# Patient Record
Sex: FEMALE | Race: WHITE | NOT HISPANIC OR LATINO | Employment: FULL TIME | URBAN - METROPOLITAN AREA
[De-identification: names, ages, dates, MRNs, and addresses within clinical notes are randomized per-mention and may not be internally consistent; named-entity substitution may affect disease eponyms.]

---

## 2023-08-28 LAB
ALANINE AMINOTRANSFERASE (SGPT) (U/L) IN SER/PLAS: 8 U/L (ref 7–45)
ALBUMIN (G/DL) IN SER/PLAS: 4.1 G/DL (ref 3.4–5)
ALBUMIN (MG/L) IN URINE: 11.4 MG/L
ALBUMIN/CREATININE (UG/MG) IN URINE: 12.7 UG/MG CRT (ref 0–30)
ALKALINE PHOSPHATASE (U/L) IN SER/PLAS: 42 U/L (ref 33–136)
ANION GAP IN SER/PLAS: 17 MMOL/L (ref 10–20)
ASPARTATE AMINOTRANSFERASE (SGOT) (U/L) IN SER/PLAS: 12 U/L (ref 9–39)
BILIRUBIN TOTAL (MG/DL) IN SER/PLAS: 0.5 MG/DL (ref 0–1.2)
CALCIUM (MG/DL) IN SER/PLAS: 9.3 MG/DL (ref 8.6–10.6)
CARBON DIOXIDE, TOTAL (MMOL/L) IN SER/PLAS: 25 MMOL/L (ref 21–32)
CHLORIDE (MMOL/L) IN SER/PLAS: 106 MMOL/L (ref 98–107)
CHOLESTEROL (MG/DL) IN SER/PLAS: 174 MG/DL (ref 0–199)
CHOLESTEROL IN HDL (MG/DL) IN SER/PLAS: 74.5 MG/DL
CHOLESTEROL/HDL RATIO: 2.3
CREATININE (MG/DL) IN SER/PLAS: 1.25 MG/DL (ref 0.5–1.05)
CREATININE (MG/DL) IN URINE: 90 MG/DL (ref 20–320)
ERYTHROCYTE DISTRIBUTION WIDTH (RATIO) BY AUTOMATED COUNT: 12.3 % (ref 11.5–14.5)
ERYTHROCYTE MEAN CORPUSCULAR HEMOGLOBIN CONCENTRATION (G/DL) BY AUTOMATED: 31.3 G/DL (ref 32–36)
ERYTHROCYTE MEAN CORPUSCULAR VOLUME (FL) BY AUTOMATED COUNT: 96 FL (ref 80–100)
ERYTHROCYTES (10*6/UL) IN BLOOD BY AUTOMATED COUNT: 4.07 X10E12/L (ref 4–5.2)
GFR FEMALE: 45 ML/MIN/1.73M2
GLUCOSE (MG/DL) IN SER/PLAS: 103 MG/DL (ref 74–99)
HEMATOCRIT (%) IN BLOOD BY AUTOMATED COUNT: 39 % (ref 36–46)
HEMOGLOBIN (G/DL) IN BLOOD: 12.2 G/DL (ref 12–16)
LDL: 87 MG/DL (ref 0–99)
LEUKOCYTES (10*3/UL) IN BLOOD BY AUTOMATED COUNT: 6.1 X10E9/L (ref 4.4–11.3)
NRBC (PER 100 WBCS) BY AUTOMATED COUNT: 0 /100 WBC (ref 0–0)
PLATELETS (10*3/UL) IN BLOOD AUTOMATED COUNT: 228 X10E9/L (ref 150–450)
POTASSIUM (MMOL/L) IN SER/PLAS: 4.7 MMOL/L (ref 3.5–5.3)
PROTEIN TOTAL: 6.4 G/DL (ref 6.4–8.2)
SODIUM (MMOL/L) IN SER/PLAS: 143 MMOL/L (ref 136–145)
THYROTROPIN (MIU/L) IN SER/PLAS BY DETECTION LIMIT <= 0.05 MIU/L: 1.82 MIU/L (ref 0.44–3.98)
TRIGLYCERIDE (MG/DL) IN SER/PLAS: 62 MG/DL (ref 0–149)
UREA NITROGEN (MG/DL) IN SER/PLAS: 30 MG/DL (ref 6–23)
VLDL: 12 MG/DL (ref 0–40)

## 2023-10-18 PROBLEM — I48.0 PAROXYSMAL ATRIAL FIBRILLATION (MULTI): Status: ACTIVE | Noted: 2023-10-18

## 2023-10-18 PROBLEM — G93.32 CHRONIC FATIGUE SYNDROME: Status: ACTIVE | Noted: 2023-10-18

## 2023-10-18 PROBLEM — E66.9 DIABETES MELLITUS TYPE 2 IN OBESE: Status: ACTIVE | Noted: 2023-10-18

## 2023-10-18 PROBLEM — E11.69 DIABETES MELLITUS TYPE 2 IN OBESE: Status: ACTIVE | Noted: 2023-10-18

## 2023-10-18 PROBLEM — E78.5 HYPERLIPIDEMIA: Status: ACTIVE | Noted: 2023-10-18

## 2023-10-18 PROBLEM — R42 DIZZINESS: Status: ACTIVE | Noted: 2023-10-18

## 2023-10-18 PROBLEM — F41.8 MIXED ANXIETY AND DEPRESSIVE DISORDER: Status: ACTIVE | Noted: 2023-10-18

## 2023-10-18 PROBLEM — G20.A1 PARKINSONS DISEASE (MULTI): Status: ACTIVE | Noted: 2023-10-18

## 2023-10-18 PROBLEM — I10 BENIGN ESSENTIAL HTN: Status: ACTIVE | Noted: 2023-10-18

## 2023-10-18 NOTE — PROGRESS NOTES
Subjective   Patient ID:   Dina Mullins is a 73 y.o. female who presents for Establish Care.  HPI  Previous [facility] [place] [place] Karyn patient.    HTN/A-fib:  Taking Lasix, Lisinopril-hydrochlorothiazide, Xarelto.  Seeing cardiology.  BP today is 128/80.  Denies dizziness, headaches.    Diabetes:  Taking Lantus 26 units daily, Rybelsus.  Last A1C was 8.1 in 2020.  POC today is 8.2.    Dizziness:  Taking Meclizine.    HLD:  Taking Atorvastatin.  Last checked Aug 2023.    Anxiety/Depression:  Taking Cymbalta.  Denies SI/HI.    Parkinson's:  Mild.  Had seen neurology in the past.  No current issues.    Health maintenance:  Smoking: Never a smoker.  Mammogram (40-75): Dec 2021. DUE  Labs: Aug 2023.  Colonoscopy (50-75): Reports 6-7 years ago.  DEXA (65+, q 2 years): Dec 2021.  Prevnar 13 (65+):  Pneumovax 23 (65+, 1 year after Prev 13):  Influenza:  Zostavax (60+, 1 time, at pharmacy):    Review of Systems  12 point review of systems negative unless stated above in HPI    Vitals:    10/23/23 1106   BP: 128/80   Pulse: 70   SpO2: 98%     Physical Exam  General: Alert and oriented, well nourished, no acute distress.  Lungs: Clear to auscultation, non-labored respiration.  Heart: Normal rate, regular rhythm, no murmur, gallop or edema.  Neurologic: Awake, alert, and oriented X3, CN II-XII intact.  Psychiatric: Cooperative, appropriate mood and affect.    Assessment/Plan   It was nice meeting you!  I have ordered you a mammogram to be done as soon as you are able.  We will call the results.  Continue specialty care.  Continue with diet and exercise for the A1C.  Continue the same medications.  Chronic conditions are stable.  Call with questions or concerns.    Follow up  4 months  Diagnoses and all orders for this visit:  Benign essential HTN  -     lisinopriL-hydrochlorothiazide 20-25 mg tablet; Take 1 tablet by mouth once daily.  -     furosemide (Lasix) 40 mg tablet; Take 1 tablet (40 mg) by mouth once daily.  Diabetes  mellitus type 2 in obese (CMS/HCC)  -     POCT glycosylated hemoglobin (Hb A1C) manually resulted  -     DULoxetine (Cymbalta) 20 mg DR capsule; Take 1 capsule (20 mg) by mouth once daily.  -     Lantus Solostar U-100 Insulin 100 unit/mL (3 mL) pen; Inject 25 Units under the skin once daily in the morning.  Pure hypercholesterolemia  Mixed anxiety and depressive disorder  Parkinson's disease, unspecified whether dyskinesia present, unspecified whether manifestations fluctuate  Paroxysmal atrial fibrillation (CMS/HCC)  Dizziness  -     fluticasone (Flonase) 50 mcg/actuation nasal spray; Administer 1 spray into each nostril once daily as needed for allergies or rhinitis.  -     meclizine (Antivert) 25 mg tablet; Take 1 tablet (25 mg) by mouth 3 times a day as needed for dizziness.  Visit for screening mammogram  -     BI mammo bilateral screening tomosynthesis; Future  Longstanding persistent atrial fibrillation (CMS/HCC)  Obesity, morbid (CMS/HCC)  Hyperlipidemia, unspecified hyperlipidemia type  -     atorvastatin (Lipitor) 40 mg tablet; Take 1 tablet (40 mg) by mouth once daily at bedtime.

## 2023-10-19 DIAGNOSIS — E66.9 DIABETES MELLITUS TYPE 2 IN OBESE: ICD-10-CM

## 2023-10-19 DIAGNOSIS — E11.69 DIABETES MELLITUS TYPE 2 IN OBESE: ICD-10-CM

## 2023-10-19 DIAGNOSIS — E78.5 HYPERLIPIDEMIA, UNSPECIFIED HYPERLIPIDEMIA TYPE: ICD-10-CM

## 2023-10-19 RX ORDER — ATORVASTATIN CALCIUM 40 MG/1
40 TABLET, FILM COATED ORAL NIGHTLY
Qty: 90 TABLET | Refills: 0 | Status: SHIPPED | OUTPATIENT
Start: 2023-10-19 | End: 2023-10-23 | Stop reason: SDUPTHER

## 2023-10-20 PROBLEM — K25.9 GASTRIC ULCER: Status: ACTIVE | Noted: 2023-10-20

## 2023-10-20 PROBLEM — D50.0 ANEMIA DUE TO CHRONIC BLOOD LOSS: Status: ACTIVE | Noted: 2023-10-20

## 2023-10-20 PROBLEM — E86.0 DEHYDRATION: Status: ACTIVE | Noted: 2023-10-20

## 2023-10-20 PROBLEM — N95.1 FEMALE CLIMACTERIC: Status: ACTIVE | Noted: 2023-10-20

## 2023-10-20 PROBLEM — K59.00 CONSTIPATION: Status: ACTIVE | Noted: 2023-10-20

## 2023-10-20 PROBLEM — M13.0 DEGENERATIVE POLYARTHRITIS: Status: ACTIVE | Noted: 2023-10-20

## 2023-10-20 PROBLEM — R07.9 CHEST PAIN: Status: ACTIVE | Noted: 2023-10-20

## 2023-10-20 PROBLEM — E11.65 UNCONTROLLED TYPE 2 DIABETES MELLITUS WITH HYPERGLYCEMIA (MULTI): Status: ACTIVE | Noted: 2023-10-20

## 2023-10-20 PROBLEM — R55 SYNCOPE AND COLLAPSE: Status: ACTIVE | Noted: 2023-10-20

## 2023-10-20 PROBLEM — Z78.9 MEDICAL HOME PATIENT: Status: ACTIVE | Noted: 2023-10-20

## 2023-10-20 PROBLEM — N39.46 MIXED INCONTINENCE: Status: ACTIVE | Noted: 2023-10-20

## 2023-10-20 PROBLEM — M17.0 PRIMARY OSTEOARTHRITIS OF BOTH KNEES: Status: ACTIVE | Noted: 2023-10-20

## 2023-10-20 PROBLEM — E13.43 GASTROPARESIS DUE TO SECONDARY DIABETES (MULTI): Status: ACTIVE | Noted: 2023-10-20

## 2023-10-20 PROBLEM — R26.9 ABNORMAL GAIT: Status: ACTIVE | Noted: 2023-10-20

## 2023-10-20 PROBLEM — J30.9 ALLERGIC RHINITIS DUE TO ALLERGEN: Status: ACTIVE | Noted: 2023-10-20

## 2023-10-20 PROBLEM — E55.9 VITAMIN D DEFICIENCY: Status: ACTIVE | Noted: 2023-10-20

## 2023-10-20 PROBLEM — I87.2 VENOUS STASIS DERMATITIS OF BOTH LOWER EXTREMITIES: Status: ACTIVE | Noted: 2023-10-20

## 2023-10-20 PROBLEM — I48.91 ATRIAL FIBRILLATION (MULTI): Status: ACTIVE | Noted: 2023-10-20

## 2023-10-20 PROBLEM — I83.893 VARICOSE VEINS OF BOTH LEGS WITH EDEMA: Status: ACTIVE | Noted: 2023-10-20

## 2023-10-20 PROBLEM — I95.9 LOW BLOOD PRESSURE: Status: ACTIVE | Noted: 2023-10-20

## 2023-10-20 PROBLEM — K57.90 DIVERTICULOSIS: Status: ACTIVE | Noted: 2023-10-20

## 2023-10-20 RX ORDER — LISINOPRIL AND HYDROCHLOROTHIAZIDE 20; 25 MG/1; MG/1
1 TABLET ORAL DAILY
COMMUNITY
End: 2023-10-23 | Stop reason: ALTCHOICE

## 2023-10-20 RX ORDER — ORAL SEMAGLUTIDE 7 MG/1
1 TABLET ORAL
COMMUNITY
Start: 2023-05-30 | End: 2023-10-23 | Stop reason: ALTCHOICE

## 2023-10-20 RX ORDER — FUROSEMIDE 40 MG/1
40 TABLET ORAL DAILY
COMMUNITY
End: 2023-10-23 | Stop reason: SDUPTHER

## 2023-10-20 RX ORDER — FLUTICASONE PROPIONATE 50 MCG
1 SPRAY, SUSPENSION (ML) NASAL DAILY PRN
COMMUNITY
End: 2023-10-23 | Stop reason: SDUPTHER

## 2023-10-20 RX ORDER — CLINDAMYCIN HYDROCHLORIDE 300 MG/1
300 CAPSULE ORAL 4 TIMES DAILY
COMMUNITY
End: 2024-01-31 | Stop reason: ALTCHOICE

## 2023-10-20 RX ORDER — MECLIZINE HYDROCHLORIDE 25 MG/1
25 TABLET ORAL 3 TIMES DAILY PRN
COMMUNITY
End: 2023-10-23 | Stop reason: SDUPTHER

## 2023-10-20 RX ORDER — ACETAMINOPHEN 325 MG/1
650 TABLET ORAL EVERY 4 HOURS PRN
COMMUNITY

## 2023-10-20 RX ORDER — SEMAGLUTIDE 1.34 MG/ML
0.25 INJECTION, SOLUTION SUBCUTANEOUS
COMMUNITY
Start: 2023-03-01 | End: 2023-10-23 | Stop reason: ALTCHOICE

## 2023-10-20 RX ORDER — DULOXETIN HYDROCHLORIDE 20 MG/1
20 CAPSULE, DELAYED RELEASE ORAL DAILY
COMMUNITY
End: 2023-10-23 | Stop reason: SDUPTHER

## 2023-10-20 RX ORDER — INSULIN GLARGINE 100 [IU]/ML
25 INJECTION, SOLUTION SUBCUTANEOUS EVERY MORNING
COMMUNITY
End: 2023-10-23 | Stop reason: SDUPTHER

## 2023-10-20 RX ORDER — TALC
3 POWDER (GRAM) TOPICAL NIGHTLY PRN
COMMUNITY
End: 2023-10-23 | Stop reason: ALTCHOICE

## 2023-10-20 RX ORDER — PEN NEEDLE, DIABETIC 31 GX3/16"
NEEDLE, DISPOSABLE MISCELLANEOUS
COMMUNITY
Start: 2022-12-19 | End: 2023-10-24

## 2023-10-20 RX ORDER — DULOXETIN HYDROCHLORIDE 60 MG/1
60 CAPSULE, DELAYED RELEASE ORAL DAILY
COMMUNITY
End: 2023-10-23 | Stop reason: ALTCHOICE

## 2023-10-20 RX ORDER — LISINOPRIL 20 MG/1
20 TABLET ORAL DAILY
COMMUNITY
End: 2023-10-23 | Stop reason: ALTCHOICE

## 2023-10-20 RX ORDER — INSULIN GLARGINE 100 [IU]/ML
26 INJECTION, SOLUTION SUBCUTANEOUS
COMMUNITY
End: 2024-05-13 | Stop reason: WASHOUT

## 2023-10-23 ENCOUNTER — OFFICE VISIT (OUTPATIENT)
Dept: PRIMARY CARE | Facility: CLINIC | Age: 73
End: 2023-10-23
Payer: MEDICARE

## 2023-10-23 VITALS
BODY MASS INDEX: 48.96 KG/M2 | DIASTOLIC BLOOD PRESSURE: 80 MMHG | HEART RATE: 70 BPM | OXYGEN SATURATION: 98 % | WEIGHT: 272 LBS | SYSTOLIC BLOOD PRESSURE: 128 MMHG

## 2023-10-23 DIAGNOSIS — G20.A1 PARKINSON'S DISEASE, UNSPECIFIED WHETHER DYSKINESIA PRESENT, UNSPECIFIED WHETHER MANIFESTATIONS FLUCTUATE (MULTI): ICD-10-CM

## 2023-10-23 DIAGNOSIS — E78.5 HYPERLIPIDEMIA, UNSPECIFIED HYPERLIPIDEMIA TYPE: ICD-10-CM

## 2023-10-23 DIAGNOSIS — I10 BENIGN ESSENTIAL HTN: Primary | ICD-10-CM

## 2023-10-23 DIAGNOSIS — I48.11 LONGSTANDING PERSISTENT ATRIAL FIBRILLATION (MULTI): ICD-10-CM

## 2023-10-23 DIAGNOSIS — E11.69 DIABETES MELLITUS TYPE 2 IN OBESE: ICD-10-CM

## 2023-10-23 DIAGNOSIS — Z12.31 VISIT FOR SCREENING MAMMOGRAM: ICD-10-CM

## 2023-10-23 DIAGNOSIS — E78.00 PURE HYPERCHOLESTEROLEMIA: ICD-10-CM

## 2023-10-23 DIAGNOSIS — F41.8 MIXED ANXIETY AND DEPRESSIVE DISORDER: ICD-10-CM

## 2023-10-23 DIAGNOSIS — E66.9 DIABETES MELLITUS TYPE 2 IN OBESE: ICD-10-CM

## 2023-10-23 DIAGNOSIS — I48.0 PAROXYSMAL ATRIAL FIBRILLATION (MULTI): ICD-10-CM

## 2023-10-23 DIAGNOSIS — E66.01 OBESITY, MORBID (MULTI): ICD-10-CM

## 2023-10-23 DIAGNOSIS — R42 DIZZINESS: ICD-10-CM

## 2023-10-23 PROBLEM — E13.43 GASTROPARESIS DUE TO SECONDARY DIABETES (MULTI): Status: RESOLVED | Noted: 2023-10-20 | Resolved: 2023-10-23

## 2023-10-23 PROBLEM — E11.65 UNCONTROLLED TYPE 2 DIABETES MELLITUS WITH HYPERGLYCEMIA (MULTI): Status: RESOLVED | Noted: 2023-10-20 | Resolved: 2023-10-23

## 2023-10-23 LAB — POC HEMOGLOBIN A1C: 8.2 (ref 4.2–6.5)

## 2023-10-23 PROCEDURE — 1036F TOBACCO NON-USER: CPT | Performed by: PHYSICIAN ASSISTANT

## 2023-10-23 PROCEDURE — 99214 OFFICE O/P EST MOD 30 MIN: CPT | Performed by: PHYSICIAN ASSISTANT

## 2023-10-23 PROCEDURE — 1125F AMNT PAIN NOTED PAIN PRSNT: CPT | Performed by: PHYSICIAN ASSISTANT

## 2023-10-23 PROCEDURE — 1159F MED LIST DOCD IN RCRD: CPT | Performed by: PHYSICIAN ASSISTANT

## 2023-10-23 PROCEDURE — 3079F DIAST BP 80-89 MM HG: CPT | Performed by: PHYSICIAN ASSISTANT

## 2023-10-23 PROCEDURE — 1160F RVW MEDS BY RX/DR IN RCRD: CPT | Performed by: PHYSICIAN ASSISTANT

## 2023-10-23 PROCEDURE — 3074F SYST BP LT 130 MM HG: CPT | Performed by: PHYSICIAN ASSISTANT

## 2023-10-23 PROCEDURE — 83036 HEMOGLOBIN GLYCOSYLATED A1C: CPT | Mod: QW | Performed by: PHYSICIAN ASSISTANT

## 2023-10-23 RX ORDER — FUROSEMIDE 40 MG/1
40 TABLET ORAL DAILY
Qty: 90 TABLET | Refills: 1 | Status: SHIPPED | OUTPATIENT
Start: 2023-10-23 | End: 2024-05-13 | Stop reason: SDUPTHER

## 2023-10-23 RX ORDER — MECLIZINE HYDROCHLORIDE 25 MG/1
25 TABLET ORAL 3 TIMES DAILY PRN
Qty: 90 TABLET | Refills: 1 | Status: SHIPPED | OUTPATIENT
Start: 2023-10-23 | End: 2024-05-13 | Stop reason: SDUPTHER

## 2023-10-23 RX ORDER — DULOXETIN HYDROCHLORIDE 20 MG/1
20 CAPSULE, DELAYED RELEASE ORAL DAILY
Qty: 90 CAPSULE | Refills: 1 | Status: SHIPPED | OUTPATIENT
Start: 2023-10-23 | End: 2024-05-13 | Stop reason: SDUPTHER

## 2023-10-23 RX ORDER — ATORVASTATIN CALCIUM 40 MG/1
40 TABLET, FILM COATED ORAL NIGHTLY
Qty: 90 TABLET | Refills: 1 | Status: SHIPPED | OUTPATIENT
Start: 2023-10-23 | End: 2023-10-24

## 2023-10-23 RX ORDER — ATORVASTATIN CALCIUM 40 MG/1
40 TABLET, FILM COATED ORAL NIGHTLY
Qty: 90 TABLET | Refills: 0 | Status: CANCELLED | OUTPATIENT
Start: 2023-10-23

## 2023-10-23 RX ORDER — FLUTICASONE PROPIONATE 50 MCG
1 SPRAY, SUSPENSION (ML) NASAL DAILY PRN
Qty: 16 G | Refills: 1 | Status: SHIPPED | OUTPATIENT
Start: 2023-10-23

## 2023-10-23 RX ORDER — INSULIN GLARGINE 100 [IU]/ML
25 INJECTION, SOLUTION SUBCUTANEOUS EVERY MORNING
Qty: 9 ML | Refills: 2 | Status: SHIPPED | OUTPATIENT
Start: 2023-10-23 | End: 2024-05-13 | Stop reason: SDUPTHER

## 2023-10-23 RX ORDER — LISINOPRIL AND HYDROCHLOROTHIAZIDE 20; 25 MG/1; MG/1
1 TABLET ORAL DAILY
Qty: 90 TABLET | Refills: 1 | Status: SHIPPED | OUTPATIENT
Start: 2023-10-23 | End: 2024-01-31 | Stop reason: SINTOL

## 2023-10-23 ASSESSMENT — ENCOUNTER SYMPTOMS
DEPRESSION: 0
OCCASIONAL FEELINGS OF UNSTEADINESS: 1
LOSS OF SENSATION IN FEET: 0

## 2023-10-23 ASSESSMENT — PATIENT HEALTH QUESTIONNAIRE - PHQ9
SUM OF ALL RESPONSES TO PHQ9 QUESTIONS 1 AND 2: 0
2. FEELING DOWN, DEPRESSED OR HOPELESS: NOT AT ALL
1. LITTLE INTEREST OR PLEASURE IN DOING THINGS: NOT AT ALL

## 2023-10-23 ASSESSMENT — PAIN SCALES - GENERAL: PAINLEVEL: 10-WORST PAIN EVER

## 2023-10-24 RX ORDER — ATORVASTATIN CALCIUM 40 MG/1
40 TABLET, FILM COATED ORAL NIGHTLY
Qty: 90 TABLET | Refills: 0 | Status: SHIPPED | OUTPATIENT
Start: 2023-10-24 | End: 2024-05-13 | Stop reason: SDUPTHER

## 2023-10-24 RX ORDER — PEN NEEDLE, DIABETIC 30 GX3/16"
NEEDLE, DISPOSABLE MISCELLANEOUS
Qty: 100 EACH | Refills: 3 | Status: SHIPPED | OUTPATIENT
Start: 2023-10-24

## 2023-12-11 ENCOUNTER — OFFICE VISIT (OUTPATIENT)
Dept: NEUROLOGY | Facility: CLINIC | Age: 73
End: 2023-12-11
Payer: MEDICARE

## 2023-12-11 VITALS — DIASTOLIC BLOOD PRESSURE: 78 MMHG | RESPIRATION RATE: 12 BRPM | SYSTOLIC BLOOD PRESSURE: 126 MMHG | HEART RATE: 64 BPM

## 2023-12-11 DIAGNOSIS — G20.C PARKINSONISM, UNSPECIFIED PARKINSONISM TYPE (MULTI): ICD-10-CM

## 2023-12-11 DIAGNOSIS — R42 DIZZINESS: Primary | ICD-10-CM

## 2023-12-11 DIAGNOSIS — R51.9 NONINTRACTABLE HEADACHE, UNSPECIFIED CHRONICITY PATTERN, UNSPECIFIED HEADACHE TYPE: ICD-10-CM

## 2023-12-11 PROCEDURE — 1125F AMNT PAIN NOTED PAIN PRSNT: CPT | Performed by: PSYCHIATRY & NEUROLOGY

## 2023-12-11 PROCEDURE — 3074F SYST BP LT 130 MM HG: CPT | Performed by: PSYCHIATRY & NEUROLOGY

## 2023-12-11 PROCEDURE — 1036F TOBACCO NON-USER: CPT | Performed by: PSYCHIATRY & NEUROLOGY

## 2023-12-11 PROCEDURE — 3078F DIAST BP <80 MM HG: CPT | Performed by: PSYCHIATRY & NEUROLOGY

## 2023-12-11 PROCEDURE — 1160F RVW MEDS BY RX/DR IN RCRD: CPT | Performed by: PSYCHIATRY & NEUROLOGY

## 2023-12-11 PROCEDURE — 1159F MED LIST DOCD IN RCRD: CPT | Performed by: PSYCHIATRY & NEUROLOGY

## 2023-12-11 PROCEDURE — 99215 OFFICE O/P EST HI 40 MIN: CPT | Performed by: PSYCHIATRY & NEUROLOGY

## 2023-12-11 NOTE — PATIENT INSTRUCTIONS
Dina it was a pleasure to see you today.      I ordered a brain MRI scan, we will set up for you at  facility in [place] next to [facility].    Then we'll see you back in a couple weeks to go over it.

## 2023-12-11 NOTE — PROGRESS NOTES
Chief complaint:    73 year old woman with left hand tremor, parkinsonism.  PCP now Ken Saleh PA-C.    HPI:    Left hand tremor persists.  Notices in left leg too sometimes, especially when sitting still.  When pays attention to it she can make it stop, then starts back up when stops paying attention.  Been having occ headaches, left posterior.  Has chronic vertigo for years, gets dizzy time to time.  Uses meclizine, helps.  Says has seen ENT Dr. Burt in past.  No recent vestibular therapy.  Has diabetes on insulin, says sugars sometimes dip into 60's.      Current meds reviewed.    I reviewed the relevant portions of the patient's chart since the last visit with me on 12/5/22.    /78   Pulse 64   Resp 12     Physical Exam  Very overweight.  Uses cane.    Neurologic Exam     Mental Status   Oriented to person, place, and time.   Level of consciousness: alert    Cranial Nerves   Cranial nerves II through XII intact.     Motor Exam   Muscle bulk: normal  Overall muscle tone: normal    Strength   Strength 5/5 except as noted.     Sensory Exam   Light touch normal.     Gait, Coordination, and Reflexes     Gait  Gait: normal    Coordination   Romberg: negative    Tremor   Action tremor: left arm    Reflexes   Reflexes 2+ except as noted.   Right plantar: normal  Left plantar: normal  Rest tremor L hand, looks parkinsonian.  Walks with cane, mild shuffle.      Assessment and Plan:  Left hand tremor, parkinsonism.  Headaches, dizziness, h/o vertigo.  Discussed.  Ordered brain MRI scan to investigate.  Then see back in couple weeks.  Consider trial of carb/levodopa.  Consider vestibular rehab theraphy.  Advised and counseled patient on the medical situation, outlook, prognosis, what to expect and all questions answered.  OA17rbp/TC>50%      Teodoro Galarza MD

## 2023-12-20 ENCOUNTER — TELEPHONE (OUTPATIENT)
Dept: NEUROLOGY | Facility: CLINIC | Age: 73
End: 2023-12-20
Payer: MEDICARE

## 2023-12-20 ENCOUNTER — ANCILLARY PROCEDURE (OUTPATIENT)
Dept: RADIOLOGY | Facility: CLINIC | Age: 73
End: 2023-12-20
Payer: MEDICARE

## 2023-12-20 DIAGNOSIS — R42 DIZZINESS: ICD-10-CM

## 2023-12-20 DIAGNOSIS — R51.9 NONINTRACTABLE HEADACHE, UNSPECIFIED CHRONICITY PATTERN, UNSPECIFIED HEADACHE TYPE: ICD-10-CM

## 2023-12-20 DIAGNOSIS — G20.C PARKINSONISM, UNSPECIFIED PARKINSONISM TYPE (MULTI): ICD-10-CM

## 2023-12-20 PROCEDURE — 70551 MRI BRAIN STEM W/O DYE: CPT

## 2023-12-26 ENCOUNTER — APPOINTMENT (OUTPATIENT)
Dept: RADIOLOGY | Facility: CLINIC | Age: 73
End: 2023-12-26
Payer: MEDICARE

## 2024-01-08 ENCOUNTER — OFFICE VISIT (OUTPATIENT)
Dept: NEUROLOGY | Facility: CLINIC | Age: 74
End: 2024-01-08
Payer: MEDICARE

## 2024-01-08 DIAGNOSIS — R42 VERTIGO: ICD-10-CM

## 2024-01-08 DIAGNOSIS — G20.A1 PARKINSON'S DISEASE, UNSPECIFIED WHETHER DYSKINESIA PRESENT, UNSPECIFIED WHETHER MANIFESTATIONS FLUCTUATE (MULTI): Primary | ICD-10-CM

## 2024-01-08 PROCEDURE — 1125F AMNT PAIN NOTED PAIN PRSNT: CPT | Performed by: PSYCHIATRY & NEUROLOGY

## 2024-01-08 PROCEDURE — 1159F MED LIST DOCD IN RCRD: CPT | Performed by: PSYCHIATRY & NEUROLOGY

## 2024-01-08 PROCEDURE — 1036F TOBACCO NON-USER: CPT | Performed by: PSYCHIATRY & NEUROLOGY

## 2024-01-08 PROCEDURE — 1160F RVW MEDS BY RX/DR IN RCRD: CPT | Performed by: PSYCHIATRY & NEUROLOGY

## 2024-01-08 PROCEDURE — 99214 OFFICE O/P EST MOD 30 MIN: CPT | Performed by: PSYCHIATRY & NEUROLOGY

## 2024-01-08 RX ORDER — CARBIDOPA AND LEVODOPA 25; 100 MG/1; MG/1
1 TABLET ORAL 3 TIMES DAILY
Qty: 90 TABLET | Refills: 3 | Status: SHIPPED | OUTPATIENT
Start: 2024-01-08 | End: 2025-01-07

## 2024-01-08 NOTE — PROGRESS NOTES
Chief complaint:    73 year old woman with left tremor, parkinsonism, dizziness.  PCP Ken Saleh PA-C.    HPI:    Had the brain MRI scan, came out OK.  Persisting left hand rest tremor. Gait slow, uses cane, some shuffle.  Has ongoing dizziness, long h/o vertigo per patient.      Current meds reviewed.    I reviewed the relevant portions of the patient's chart since the last visit with me on  12/11/23.    Neurologic Exam     Mental Status   Oriented to person, place, and time.   Level of consciousness: alert    Cranial Nerves   Cranial nerves II through XII intact.     Motor Exam   Muscle bulk: normal  Overall muscle tone: normal    Strength   Strength 5/5 except as noted.     Sensory Exam   Light touch normal.     Gait, Coordination, and Reflexes     Gait  Gait: normal    Coordination   Romberg: negative    Tremor   Action tremor: left arm    Reflexes   Reflexes 2+ except as noted.   Right plantar: normal  Left plantar: normal  Rest tremor L hand, looks parkinsonian.  Walks with cane, mild shuffle.      I reviewed the following data on the patient:  Brain MRI scan negative.    Assessment and Plan:  Parkinsonism.  Discussed.   Suspect PD.  Will begin treatment with carb/levo 25/100 tablet and titrate up to one tab TID.  The risks, benefits, alternatives, and indications were discussed with the patient, all questions answered, and the patient understands and agrees to proceed.  Also ordered PT for the dizziness/vertigo.  Call if problems and see back in 6 weeks for recheck.  The patient was advised I will be retiring in September of 2024.   [id number]/TC>50%      Teodoro Galarza MD

## 2024-01-08 NOTE — PATIENT INSTRUCTIONS
"Dina it was a pleasure seeing you today.  You are showing signs of Parkinson's.  To treat it, here is what I recommended:     Start a new medicine called \"carbidopa/levodopa 25/100.\"  Here is what to do:     Week 1:  take one tablet each morning with breakfast   Week 2:  take one tablet with breakfast and one tablet with lunch   Week 3:  take one tablet with breakfast, one with lunch, and one with dinner.    2.    I'll also order some physical therapy for you.    3.    Come back and see me in six weeks for a check-up.  "

## 2024-01-31 ENCOUNTER — OFFICE VISIT (OUTPATIENT)
Dept: PRIMARY CARE | Facility: CLINIC | Age: 74
End: 2024-01-31
Payer: MEDICARE

## 2024-01-31 VITALS
SYSTOLIC BLOOD PRESSURE: 108 MMHG | DIASTOLIC BLOOD PRESSURE: 70 MMHG | OXYGEN SATURATION: 98 % | WEIGHT: 267.6 LBS | HEART RATE: 62 BPM | BODY MASS INDEX: 47.4 KG/M2

## 2024-01-31 DIAGNOSIS — I10 BENIGN ESSENTIAL HTN: ICD-10-CM

## 2024-01-31 DIAGNOSIS — E11.69 DIABETES MELLITUS TYPE 2 IN OBESE: ICD-10-CM

## 2024-01-31 DIAGNOSIS — E66.9 DIABETES MELLITUS TYPE 2 IN OBESE: ICD-10-CM

## 2024-01-31 DIAGNOSIS — I48.0 PAROXYSMAL ATRIAL FIBRILLATION (MULTI): ICD-10-CM

## 2024-01-31 DIAGNOSIS — R42 DIZZINESS: Primary | ICD-10-CM

## 2024-01-31 PROCEDURE — 99214 OFFICE O/P EST MOD 30 MIN: CPT | Performed by: INTERNAL MEDICINE

## 2024-01-31 PROCEDURE — 4010F ACE/ARB THERAPY RXD/TAKEN: CPT | Performed by: INTERNAL MEDICINE

## 2024-01-31 PROCEDURE — 1036F TOBACCO NON-USER: CPT | Performed by: INTERNAL MEDICINE

## 2024-01-31 PROCEDURE — 1159F MED LIST DOCD IN RCRD: CPT | Performed by: INTERNAL MEDICINE

## 2024-01-31 PROCEDURE — 3074F SYST BP LT 130 MM HG: CPT | Performed by: INTERNAL MEDICINE

## 2024-01-31 PROCEDURE — 1160F RVW MEDS BY RX/DR IN RCRD: CPT | Performed by: INTERNAL MEDICINE

## 2024-01-31 PROCEDURE — 3078F DIAST BP <80 MM HG: CPT | Performed by: INTERNAL MEDICINE

## 2024-01-31 PROCEDURE — 1125F AMNT PAIN NOTED PAIN PRSNT: CPT | Performed by: INTERNAL MEDICINE

## 2024-01-31 PROCEDURE — 1157F ADVNC CARE PLAN IN RCRD: CPT | Performed by: INTERNAL MEDICINE

## 2024-01-31 RX ORDER — LISINOPRIL 10 MG/1
10 TABLET ORAL DAILY
Qty: 30 TABLET | Refills: 5 | Status: SHIPPED | OUTPATIENT
Start: 2024-01-31 | End: 2024-05-13 | Stop reason: SDUPTHER

## 2024-01-31 ASSESSMENT — ENCOUNTER SYMPTOMS
OCCASIONAL FEELINGS OF UNSTEADINESS: 0
CHILLS: 0
HEADACHES: 0
DEPRESSION: 0
FEVER: 0
PALPITATIONS: 0
DIZZINESS: 1
LOSS OF SENSATION IN FEET: 0
RHINORRHEA: 0
LIGHT-HEADEDNESS: 1

## 2024-01-31 ASSESSMENT — PAIN SCALES - GENERAL: PAINLEVEL: 5

## 2024-01-31 ASSESSMENT — PATIENT HEALTH QUESTIONNAIRE - PHQ9
10. IF YOU CHECKED OFF ANY PROBLEMS, HOW DIFFICULT HAVE THESE PROBLEMS MADE IT FOR YOU TO DO YOUR WORK, TAKE CARE OF THINGS AT HOME, OR GET ALONG WITH OTHER PEOPLE: SOMEWHAT DIFFICULT
SUM OF ALL RESPONSES TO PHQ9 QUESTIONS 1 AND 2: 2
2. FEELING DOWN, DEPRESSED OR HOPELESS: SEVERAL DAYS
1. LITTLE INTEREST OR PLEASURE IN DOING THINGS: SEVERAL DAYS

## 2024-01-31 NOTE — PROGRESS NOTES
Subjective   Patient ID: Dina Mullins is a 73 y.o. female who presents for Vertigo.    Around marifer-felt dizzy and a little sick. Took meclizine. But kept going on. Saw Dr Maria--did MRI and added dopamine for her worsening parkisons. Helped a little but still very dizzy any time bends over. Not spinning more like off balance         Review of Systems   Constitutional:  Negative for chills and fever.   HENT:  Negative for congestion and rhinorrhea.    Cardiovascular:  Negative for chest pain and palpitations.   Skin:  Positive for rash (between legs).   Neurological:  Positive for dizziness and light-headedness. Negative for syncope and headaches.       Objective   /70 (BP Location: Left arm, Patient Position: Sitting)   Pulse 62   Wt 121 kg (267 lb 9.6 oz)   SpO2 98%   BMI 47.40 kg/m²     Physical Exam  HENT:      Ears:      Comments: TM scarred but clear  Eyes:      Comments: No nystagmus   Cardiovascular:      Rate and Rhythm: Normal rate and regular rhythm.      Heart sounds: No murmur heard.  Pulmonary:      Effort: Pulmonary effort is normal.      Breath sounds: Normal breath sounds.   Musculoskeletal:      Comments: Struggles to rise from chair-needs arms   Neurological:      Sensory: Sensory deficit (feet) present.         Assessment/Plan  will order labs, stop hydrochlorothiazide and decrease lisinopril dose  Diagnoses and all orders for this visit:  Dizziness  -     CBC and Auto Differential; Future  -     Urinalysis with Reflex Microscopic; Future  Paroxysmal atrial fibrillation (CMS/HCC)  -     Comprehensive Metabolic Panel; Future  Diabetes mellitus type 2 in obese (CMS/HCC)  -     Hemoglobin A1C; Future  Benign essential HTN  -     lisinopril 10 mg tablet; Take 1 tablet (10 mg) by mouth once daily.    Advise follow up next week

## 2024-02-01 ENCOUNTER — LAB (OUTPATIENT)
Dept: LAB | Facility: LAB | Age: 74
End: 2024-02-01
Payer: MEDICARE

## 2024-02-01 DIAGNOSIS — R42 DIZZINESS: ICD-10-CM

## 2024-02-01 DIAGNOSIS — E11.69 DIABETES MELLITUS TYPE 2 IN OBESE: ICD-10-CM

## 2024-02-01 DIAGNOSIS — E66.9 DIABETES MELLITUS TYPE 2 IN OBESE: ICD-10-CM

## 2024-02-01 DIAGNOSIS — I48.0 PAROXYSMAL ATRIAL FIBRILLATION (MULTI): ICD-10-CM

## 2024-02-01 DIAGNOSIS — N39.0 ACUTE UTI: Primary | ICD-10-CM

## 2024-02-01 LAB
BASOPHILS # BLD AUTO: 0.03 X10*3/UL (ref 0–0.1)
BASOPHILS NFR BLD AUTO: 0.5 %
EOSINOPHIL # BLD AUTO: 0.28 X10*3/UL (ref 0–0.4)
EOSINOPHIL NFR BLD AUTO: 4.5 %
ERYTHROCYTE [DISTWIDTH] IN BLOOD BY AUTOMATED COUNT: 12.1 % (ref 11.5–14.5)
HCT VFR BLD AUTO: 42.3 % (ref 36–46)
HGB BLD-MCNC: 13.3 G/DL (ref 12–16)
IMM GRANULOCYTES # BLD AUTO: 0.03 X10*3/UL (ref 0–0.5)
IMM GRANULOCYTES NFR BLD AUTO: 0.5 % (ref 0–0.9)
LYMPHOCYTES # BLD AUTO: 1.58 X10*3/UL (ref 0.8–3)
LYMPHOCYTES NFR BLD AUTO: 25.4 %
MCH RBC QN AUTO: 30.6 PG (ref 26–34)
MCHC RBC AUTO-ENTMCNC: 31.4 G/DL (ref 32–36)
MCV RBC AUTO: 97 FL (ref 80–100)
MONOCYTES # BLD AUTO: 0.31 X10*3/UL (ref 0.05–0.8)
MONOCYTES NFR BLD AUTO: 5 %
NEUTROPHILS # BLD AUTO: 3.98 X10*3/UL (ref 1.6–5.5)
NEUTROPHILS NFR BLD AUTO: 64.1 %
NRBC BLD-RTO: 0 /100 WBCS (ref 0–0)
PLATELET # BLD AUTO: 241 X10*3/UL (ref 150–450)
RBC # BLD AUTO: 4.35 X10*6/UL (ref 4–5.2)
WBC # BLD AUTO: 6.2 X10*3/UL (ref 4.4–11.3)

## 2024-02-01 PROCEDURE — 85025 COMPLETE CBC W/AUTO DIFF WBC: CPT

## 2024-02-01 PROCEDURE — 80053 COMPREHEN METABOLIC PANEL: CPT

## 2024-02-01 PROCEDURE — 36415 COLL VENOUS BLD VENIPUNCTURE: CPT

## 2024-02-01 PROCEDURE — 83036 HEMOGLOBIN GLYCOSYLATED A1C: CPT

## 2024-02-02 ENCOUNTER — LAB (OUTPATIENT)
Dept: LAB | Facility: LAB | Age: 74
End: 2024-02-02
Payer: MEDICARE

## 2024-02-02 DIAGNOSIS — E66.9 DIABETES MELLITUS TYPE 2 IN OBESE: ICD-10-CM

## 2024-02-02 DIAGNOSIS — E11.69 DIABETES MELLITUS TYPE 2 IN OBESE: ICD-10-CM

## 2024-02-02 LAB
ALBUMIN SERPL BCP-MCNC: 4.4 G/DL (ref 3.4–5)
ALP SERPL-CCNC: 42 U/L (ref 33–136)
ALT SERPL W P-5'-P-CCNC: 10 U/L (ref 7–45)
ANION GAP SERPL CALC-SCNC: 16 MMOL/L (ref 10–20)
APPEARANCE UR: ABNORMAL
AST SERPL W P-5'-P-CCNC: 16 U/L (ref 9–39)
BACTERIA #/AREA URNS AUTO: ABNORMAL /HPF
BILIRUB SERPL-MCNC: 0.8 MG/DL (ref 0–1.2)
BILIRUB UR STRIP.AUTO-MCNC: NEGATIVE MG/DL
BUN SERPL-MCNC: 34 MG/DL (ref 6–23)
CALCIUM SERPL-MCNC: 9.3 MG/DL (ref 8.6–10.6)
CHLORIDE SERPL-SCNC: 104 MMOL/L (ref 98–107)
CO2 SERPL-SCNC: 24 MMOL/L (ref 21–32)
COLOR UR: YELLOW
CREAT SERPL-MCNC: 1.37 MG/DL (ref 0.5–1.05)
EGFRCR SERPLBLD CKD-EPI 2021: 41 ML/MIN/1.73M*2
EST. AVERAGE GLUCOSE BLD GHB EST-MCNC: 174 MG/DL
GLUCOSE SERPL-MCNC: 153 MG/DL (ref 74–99)
GLUCOSE UR STRIP.AUTO-MCNC: NEGATIVE MG/DL
HBA1C MFR BLD: 7.7 %
HYALINE CASTS #/AREA URNS AUTO: ABNORMAL /LPF
KETONES UR STRIP.AUTO-MCNC: NEGATIVE MG/DL
LEUKOCYTE ESTERASE UR QL STRIP.AUTO: ABNORMAL
NITRITE UR QL STRIP.AUTO: NEGATIVE
PH UR STRIP.AUTO: 6 [PH]
POTASSIUM SERPL-SCNC: 4.6 MMOL/L (ref 3.5–5.3)
PROT SERPL-MCNC: 6.9 G/DL (ref 6.4–8.2)
PROT UR STRIP.AUTO-MCNC: NEGATIVE MG/DL
RBC # UR STRIP.AUTO: ABNORMAL /UL
RBC #/AREA URNS AUTO: ABNORMAL /HPF
SODIUM SERPL-SCNC: 139 MMOL/L (ref 136–145)
SP GR UR STRIP.AUTO: 1.01
SQUAMOUS #/AREA URNS AUTO: ABNORMAL /HPF
UROBILINOGEN UR STRIP.AUTO-MCNC: <2 MG/DL
WBC #/AREA URNS AUTO: >50 /HPF

## 2024-02-02 PROCEDURE — 81001 URINALYSIS AUTO W/SCOPE: CPT

## 2024-02-02 RX ORDER — BLOOD-GLUCOSE METER
1 EACH MISCELLANEOUS 4 TIMES DAILY
COMMUNITY
Start: 2024-01-30 | End: 2024-02-02 | Stop reason: SDUPTHER

## 2024-02-02 RX ORDER — BLOOD-GLUCOSE METER
1 EACH MISCELLANEOUS 4 TIMES DAILY
Qty: 300 STRIP | Refills: 3 | Status: SHIPPED | OUTPATIENT
Start: 2024-02-02

## 2024-02-02 NOTE — PROGRESS NOTES
Subjective   Patient ID:   Dina Mullins is a 73 y.o. female who presents for Follow-up and Dizziness.  [facility]  Saw Dr. Montana on 1/31/24 while I was on vacation.  She was having dizziness.  She had recently been started on Dopamine by neurology.  Dr. Montana ordered labs showing slightly elevated creatinine and A1C of 7.7.  She was found to have a UTI - was given Bactrim.  She also stopped the hydrochlorothiazide and lowered the Lisinopril dose.  BP is improved today to 120/70.    HTN/A-fib:  Taking Lasix, Lisinopril, Xarelto.  Seeing cardiology.  BP today is 120/70.  Denies headaches.    Diabetes:  Taking Lantus 26 units daily, Rybelsus.  Last A1C was 7.7 in Feb 2024.    Dizziness/Tinnitus:  Taking Meclizine which helps.  She did have a normal brain MRI.  She does see neurology.  Does not follow with ENT.  Causing everyday struggles right now.    HLD:  Taking Atorvastatin.  Last checked Aug 2023.    Anxiety/Depression:  Taking Cymbalta.  Denies SI/HI.    Parkinson's:  Taking Dopamine now.  Seeing neurology.    Health maintenance:  Smoking: Never a smoker.  Mammogram (40-75): Dec 2021. DUE - ordered last visit.  Labs: Aug 2023.  Colonoscopy (50-75): Reports 6-7 years ago.  DEXA (65+, q 2 years): Dec 2021.  Prevnar 13 (65+):  Pneumovax 23 (65+, 1 year after Prev 13):  Influenza:  Zostavax (60+, 1 time, at pharmacy):    Review of Systems  12 point review of systems negative unless stated above in HPI    Vitals:    02/07/24 1308   BP: 120/70   Pulse: 72   SpO2: 97%     Physical Exam  General: Alert and oriented, well nourished, no acute distress.  Lungs: Clear to auscultation, non-labored respiration.  Heart: Normal rate, regular rhythm, no murmur, gallop or edema.  Neurologic: Awake, alert, and oriented X3, CN II-XII intact.  Psychiatric: Cooperative, appropriate mood and affect.    Assessment/Plan   I am sorry to hear about your dizziness!  I have placed a referral to ENT for further management.  I have also  placed a referral for vestibular therapy for the dizziness.  Please work on increasing your water intake.  Continue the Meclizine.  I have ordered you a mammogram to be done as soon as you are able.  We will call the results.  If symptoms persist or worsen despite current plan of care, please contact your healthcare provider for further evaluation.  Patient instructed to contact the office if there are any questions regarding their care or treatment.   [facility] [phone]    Fu in May for Medicare wellness  Diagnoses and all orders for this visit:  Dizziness  -     Referral to ENT; Future  -     Referral to Physical Therapy; Future  Paroxysmal atrial fibrillation (CMS/HCC)  Diabetes mellitus type 2 in obese (CMS/HCC)  Benign essential HTN  Pure hypercholesterolemia  Parkinson's disease, unspecified whether dyskinesia present, unspecified whether manifestations fluctuate  Obesity, morbid (CMS/[facility])  Visit for screening mammogram  -     BI mammo bilateral screening tomosynthesis; Future  Tinnitus of both ears  -     Referral to ENT; Future  -     Referral to Physical Therapy; Future

## 2024-02-05 RX ORDER — SULFAMETHOXAZOLE AND TRIMETHOPRIM 800; 160 MG/1; MG/1
1 TABLET ORAL 2 TIMES DAILY
Qty: 10 TABLET | Refills: 0 | Status: SHIPPED | OUTPATIENT
Start: 2024-02-05 | End: 2024-02-10

## 2024-02-07 ENCOUNTER — OFFICE VISIT (OUTPATIENT)
Dept: PRIMARY CARE | Facility: CLINIC | Age: 74
End: 2024-02-07
Payer: MEDICARE

## 2024-02-07 VITALS
BODY MASS INDEX: 47.01 KG/M2 | WEIGHT: 265.4 LBS | HEART RATE: 72 BPM | DIASTOLIC BLOOD PRESSURE: 70 MMHG | SYSTOLIC BLOOD PRESSURE: 120 MMHG | OXYGEN SATURATION: 97 %

## 2024-02-07 DIAGNOSIS — E11.69 DIABETES MELLITUS TYPE 2 IN OBESE: ICD-10-CM

## 2024-02-07 DIAGNOSIS — E66.9 DIABETES MELLITUS TYPE 2 IN OBESE: ICD-10-CM

## 2024-02-07 DIAGNOSIS — Z12.31 VISIT FOR SCREENING MAMMOGRAM: ICD-10-CM

## 2024-02-07 DIAGNOSIS — H93.13 TINNITUS OF BOTH EARS: ICD-10-CM

## 2024-02-07 DIAGNOSIS — E78.00 PURE HYPERCHOLESTEROLEMIA: ICD-10-CM

## 2024-02-07 DIAGNOSIS — E66.01 OBESITY, MORBID (MULTI): ICD-10-CM

## 2024-02-07 DIAGNOSIS — I48.0 PAROXYSMAL ATRIAL FIBRILLATION (MULTI): ICD-10-CM

## 2024-02-07 DIAGNOSIS — I10 BENIGN ESSENTIAL HTN: ICD-10-CM

## 2024-02-07 DIAGNOSIS — G20.A1 PARKINSON'S DISEASE, UNSPECIFIED WHETHER DYSKINESIA PRESENT, UNSPECIFIED WHETHER MANIFESTATIONS FLUCTUATE (MULTI): ICD-10-CM

## 2024-02-07 DIAGNOSIS — R42 DIZZINESS: Primary | ICD-10-CM

## 2024-02-07 PROCEDURE — 99214 OFFICE O/P EST MOD 30 MIN: CPT | Performed by: PHYSICIAN ASSISTANT

## 2024-02-07 PROCEDURE — 1036F TOBACCO NON-USER: CPT | Performed by: PHYSICIAN ASSISTANT

## 2024-02-07 PROCEDURE — 1125F AMNT PAIN NOTED PAIN PRSNT: CPT | Performed by: PHYSICIAN ASSISTANT

## 2024-02-07 PROCEDURE — 1160F RVW MEDS BY RX/DR IN RCRD: CPT | Performed by: PHYSICIAN ASSISTANT

## 2024-02-07 PROCEDURE — 3074F SYST BP LT 130 MM HG: CPT | Performed by: PHYSICIAN ASSISTANT

## 2024-02-07 PROCEDURE — 1157F ADVNC CARE PLAN IN RCRD: CPT | Performed by: PHYSICIAN ASSISTANT

## 2024-02-07 PROCEDURE — 1159F MED LIST DOCD IN RCRD: CPT | Performed by: PHYSICIAN ASSISTANT

## 2024-02-07 PROCEDURE — 3051F HG A1C>EQUAL 7.0%<8.0%: CPT | Performed by: PHYSICIAN ASSISTANT

## 2024-02-07 PROCEDURE — 4010F ACE/ARB THERAPY RXD/TAKEN: CPT | Performed by: PHYSICIAN ASSISTANT

## 2024-02-07 PROCEDURE — 3078F DIAST BP <80 MM HG: CPT | Performed by: PHYSICIAN ASSISTANT

## 2024-02-07 ASSESSMENT — ENCOUNTER SYMPTOMS
DEPRESSION: 0
OCCASIONAL FEELINGS OF UNSTEADINESS: 0
LOSS OF SENSATION IN FEET: 0

## 2024-02-07 ASSESSMENT — PATIENT HEALTH QUESTIONNAIRE - PHQ9
1. LITTLE INTEREST OR PLEASURE IN DOING THINGS: NOT AT ALL
2. FEELING DOWN, DEPRESSED OR HOPELESS: NOT AT ALL
SUM OF ALL RESPONSES TO PHQ9 QUESTIONS 1 AND 2: 0

## 2024-02-07 ASSESSMENT — LIFESTYLE VARIABLES
HOW MANY STANDARD DRINKS CONTAINING ALCOHOL DO YOU HAVE ON A TYPICAL DAY: PATIENT DOES NOT DRINK
HOW OFTEN DO YOU HAVE A DRINK CONTAINING ALCOHOL: NEVER
AUDIT-C TOTAL SCORE: 0
HOW OFTEN DO YOU HAVE SIX OR MORE DRINKS ON ONE OCCASION: NEVER
SKIP TO QUESTIONS 9-10: 1

## 2024-02-07 ASSESSMENT — PAIN SCALES - GENERAL: PAINLEVEL: 7

## 2024-02-19 ENCOUNTER — OFFICE VISIT (OUTPATIENT)
Dept: NEUROLOGY | Facility: CLINIC | Age: 74
End: 2024-02-19
Payer: MEDICARE

## 2024-02-19 DIAGNOSIS — G20.A1 PARKINSON'S DISEASE, UNSPECIFIED WHETHER DYSKINESIA PRESENT, UNSPECIFIED WHETHER MANIFESTATIONS FLUCTUATE (MULTI): Primary | ICD-10-CM

## 2024-02-19 PROCEDURE — 1036F TOBACCO NON-USER: CPT | Performed by: PSYCHIATRY & NEUROLOGY

## 2024-02-19 PROCEDURE — 1125F AMNT PAIN NOTED PAIN PRSNT: CPT | Performed by: PSYCHIATRY & NEUROLOGY

## 2024-02-19 PROCEDURE — 1157F ADVNC CARE PLAN IN RCRD: CPT | Performed by: PSYCHIATRY & NEUROLOGY

## 2024-02-19 PROCEDURE — 3051F HG A1C>EQUAL 7.0%<8.0%: CPT | Performed by: PSYCHIATRY & NEUROLOGY

## 2024-02-19 PROCEDURE — 4010F ACE/ARB THERAPY RXD/TAKEN: CPT | Performed by: PSYCHIATRY & NEUROLOGY

## 2024-02-19 PROCEDURE — 99214 OFFICE O/P EST MOD 30 MIN: CPT | Performed by: PSYCHIATRY & NEUROLOGY

## 2024-02-19 PROCEDURE — 1159F MED LIST DOCD IN RCRD: CPT | Performed by: PSYCHIATRY & NEUROLOGY

## 2024-02-19 PROCEDURE — 1160F RVW MEDS BY RX/DR IN RCRD: CPT | Performed by: PSYCHIATRY & NEUROLOGY

## 2024-02-19 NOTE — PROGRESS NOTES
Chief complaint:    73 year old woman with parkinsonism.  PCP Ken Saleh PA-C.    HPI:    Started up on the carb/levo and worked up to one tab TID.  Actually usually takes BID because she often sleeps in late and only eats two meals.  Found that got nausea if took on empty stomach.  Feeling much better.  Tremor basically gone.  Moving easier.  Walking better.  The medicine is working.  No side effects.  Ended up retiring from the [employer].  Going to start some PT for the vertigo.    Current medications include:  Carb/levo 25/100 takes 1-1 and sometimes 1-1-1 if starts early enough    I reviewed the relevant portions of the patient's chart since the last visit with me on 1/8/24.    Neurologic Exam     Mental Status   Oriented to person, place, and time.   Level of consciousness: alert    Cranial Nerves   Cranial nerves II through XII intact.     Motor Exam   Muscle bulk: normal  Overall muscle tone: normal    Strength   Strength 5/5 except as noted.     Sensory Exam   Light touch normal.     Gait, Coordination, and Reflexes     Gait  Gait: normal    Coordination   Romberg: negative    Tremor   Action tremor: left arm    Reflexes   Reflexes 2+ except as noted.   Right plantar: normal  Left plantar: normal  No rest tremor today.  Voice good.  No masked face.  Up slow, no shuffle, has cane.      Assessment and Plan:  Doing well.  Responding to treatment indicative of diagnosis of PD.  Discussed.  Continue current treatment.   See back in summer.  The patient was advised I will be retiring in September of 2024.   Next time I will refer patient to one of my colleagues for ongoing neurologic care.  [id number]/TC>50%      Teodoro Galarza MD

## 2024-02-22 ENCOUNTER — APPOINTMENT (OUTPATIENT)
Dept: PRIMARY CARE | Facility: CLINIC | Age: 74
End: 2024-02-22
Payer: MEDICARE

## 2024-03-05 DIAGNOSIS — I48.11 LONGSTANDING PERSISTENT ATRIAL FIBRILLATION (MULTI): Primary | ICD-10-CM

## 2024-03-05 DIAGNOSIS — G20.A1 PARKINSON'S DISEASE, UNSPECIFIED WHETHER DYSKINESIA PRESENT, UNSPECIFIED WHETHER MANIFESTATIONS FLUCTUATE (MULTI): ICD-10-CM

## 2024-04-02 ENCOUNTER — OFFICE VISIT (OUTPATIENT)
Dept: CARDIOLOGY | Facility: CLINIC | Age: 74
End: 2024-04-02
Payer: MEDICARE

## 2024-04-02 VITALS — SYSTOLIC BLOOD PRESSURE: 120 MMHG | WEIGHT: 260 LBS | BODY MASS INDEX: 46.06 KG/M2 | DIASTOLIC BLOOD PRESSURE: 72 MMHG

## 2024-04-02 DIAGNOSIS — I48.0 PAROXYSMAL ATRIAL FIBRILLATION (MULTI): ICD-10-CM

## 2024-04-02 PROCEDURE — 99213 OFFICE O/P EST LOW 20 MIN: CPT | Performed by: INTERNAL MEDICINE

## 2024-04-02 PROCEDURE — 1160F RVW MEDS BY RX/DR IN RCRD: CPT | Performed by: INTERNAL MEDICINE

## 2024-04-02 PROCEDURE — 3078F DIAST BP <80 MM HG: CPT | Performed by: INTERNAL MEDICINE

## 2024-04-02 PROCEDURE — 93005 ELECTROCARDIOGRAM TRACING: CPT | Performed by: INTERNAL MEDICINE

## 2024-04-02 PROCEDURE — 1157F ADVNC CARE PLAN IN RCRD: CPT | Performed by: INTERNAL MEDICINE

## 2024-04-02 PROCEDURE — 3074F SYST BP LT 130 MM HG: CPT | Performed by: INTERNAL MEDICINE

## 2024-04-02 PROCEDURE — 93010 ELECTROCARDIOGRAM REPORT: CPT | Performed by: INTERNAL MEDICINE

## 2024-04-02 PROCEDURE — 3051F HG A1C>EQUAL 7.0%<8.0%: CPT | Performed by: INTERNAL MEDICINE

## 2024-04-02 PROCEDURE — 1126F AMNT PAIN NOTED NONE PRSNT: CPT | Performed by: INTERNAL MEDICINE

## 2024-04-02 PROCEDURE — 1159F MED LIST DOCD IN RCRD: CPT | Performed by: INTERNAL MEDICINE

## 2024-04-02 PROCEDURE — 4010F ACE/ARB THERAPY RXD/TAKEN: CPT | Performed by: INTERNAL MEDICINE

## 2024-04-02 ASSESSMENT — ENCOUNTER SYMPTOMS
OCCASIONAL FEELINGS OF UNSTEADINESS: 1
LOSS OF SENSATION IN FEET: 0
DEPRESSION: 0

## 2024-04-02 ASSESSMENT — PATIENT HEALTH QUESTIONNAIRE - PHQ9: 2. FEELING DOWN, DEPRESSED OR HOPELESS: NOT AT ALL

## 2024-04-02 ASSESSMENT — COLUMBIA-SUICIDE SEVERITY RATING SCALE - C-SSRS
6. HAVE YOU EVER DONE ANYTHING, STARTED TO DO ANYTHING, OR PREPARED TO DO ANYTHING TO END YOUR LIFE?: NO
2. HAVE YOU ACTUALLY HAD ANY THOUGHTS OF KILLING YOURSELF?: NO
1. IN THE PAST MONTH, HAVE YOU WISHED YOU WERE DEAD OR WISHED YOU COULD GO TO SLEEP AND NOT WAKE UP?: NO

## 2024-04-02 ASSESSMENT — PAIN SCALES - GENERAL: PAINLEVEL: 0-NO PAIN

## 2024-04-02 NOTE — PROGRESS NOTES
No chief complaint on file.           The patient is well-known to me from previous encounters.  Last last saw her about a year ago.  She has a history of paroxysmal atrial fibrillation in the setting of hypertension, diabetes, and obesity.  To her knowledge she has not had any major recurrent episodes of though she does complain of quite a bit of chronic fatigue.       Active Ambulatory Problems     Diagnosis Date Noted    Benign essential HTN 10/18/2023    Chronic fatigue syndrome 10/18/2023    Diabetes mellitus type 2 in obese 10/18/2023    Hyperlipidemia 10/18/2023    Mixed anxiety and depressive disorder 10/18/2023    Parkinsons disease (CMS/HCC) 10/18/2023    Paroxysmal atrial fibrillation (CMS/HCC) 10/18/2023    Dizziness 10/18/2023    Medical home patient 10/20/2023    Abnormal gait 10/20/2023    Allergic rhinitis due to allergen 10/20/2023    Anemia due to chronic blood loss 10/20/2023    Chest pain 10/20/2023    Constipation 10/20/2023    Degenerative polyarthritis 10/20/2023    Dehydration 10/20/2023    Diverticulosis 10/20/2023    Female climacteric 10/20/2023    Mixed incontinence 10/20/2023    Primary osteoarthritis of both knees 10/20/2023    Syncope and collapse 10/20/2023    Varicose veins of both legs with edema 10/20/2023    Venous stasis dermatitis of both lower extremities 10/20/2023    Vitamin D deficiency 10/20/2023    Atrial fibrillation (CMS/HCC) 10/20/2023    Gastric ulcer 10/20/2023    Low blood pressure 10/20/2023    Obesity, morbid (CMS/HCC) 10/23/2023     Resolved Ambulatory Problems     Diagnosis Date Noted    Uncontrolled type 2 diabetes mellitus with hyperglycemia (CMS/HCC) 10/20/2023    Gastroparesis due to secondary diabetes (CMS/HCC) 10/20/2023     No Additional Past Medical History        Review of Systems   All other systems reviewed and are negative.       Objective     There were no vitals filed for this visit.     Vitals and nursing note reviewed.   Constitutional:        Appearance: Healthy appearance. Obese.   HENT:    Mouth/Throat:      Pharynx: Oropharynx is clear.   Pulmonary:      Effort: Pulmonary effort is normal.      Breath sounds: Normal breath sounds.   Cardiovascular:      PMI at left midclavicular line. Normal rate. Regular rhythm. Normal S1. Normal S2.       Murmurs: There is a grade 1/6 holosystolic murmur.      No gallop.  No click. No rub.   Pulses:     Intact distal pulses.   Edema:     Peripheral edema absent.   Abdominal:      General: Bowel sounds are normal.   Musculoskeletal:      Cervical back: Normal range of motion. Skin:     General: Skin is warm and dry.   Neurological:      General: No focal deficit present.      Mental Status: Alert and oriented to person, place and time.          Lab Review:   No visits with results within 2 Month(s) from this visit.   Latest known visit with results is:   Lab on 02/01/2024   Component Date Value    WBC 02/01/2024 6.2     nRBC 02/01/2024 0.0     RBC 02/01/2024 4.35     Hemoglobin 02/01/2024 13.3     Hematocrit 02/01/2024 42.3     MCV 02/01/2024 97     MCH 02/01/2024 30.6     MCHC 02/01/2024 31.4 (L)     RDW 02/01/2024 12.1     Platelets 02/01/2024 241     Neutrophils % 02/01/2024 64.1     Immature Granulocytes %,* 02/01/2024 0.5     Lymphocytes % 02/01/2024 25.4     Monocytes % 02/01/2024 5.0     Eosinophils % 02/01/2024 4.5     Basophils % 02/01/2024 0.5     Neutrophils Absolute 02/01/2024 3.98     Immature Granulocytes Ab* 02/01/2024 0.03     Lymphocytes Absolute 02/01/2024 1.58     Monocytes Absolute 02/01/2024 0.31     Eosinophils Absolute 02/01/2024 0.28     Basophils Absolute 02/01/2024 0.03     Hemoglobin A1C 02/01/2024 7.7 (H)     Estimated Average Glucose 02/01/2024 174     Glucose 02/01/2024 153 (H)     Sodium 02/01/2024 139     Potassium 02/01/2024 4.6     Chloride 02/01/2024 104     Bicarbonate 02/01/2024 24     Anion Gap 02/01/2024 16     Urea Nitrogen 02/01/2024 34 (H)     Creatinine 02/01/2024 1.37 (H)      eGFR 02/01/2024 41 (L)     Calcium 02/01/2024 9.3     Albumin 02/01/2024 4.4     Alkaline Phosphatase 02/01/2024 42     Total Protein 02/01/2024 6.9     AST 02/01/2024 16     Bilirubin, Total 02/01/2024 0.8     ALT 02/01/2024 10     Color, Urine 02/02/2024 Yellow     Appearance, Urine 02/02/2024 Hazy (N)     Specific Gravity, Urine 02/02/2024 1.010     pH, Urine 02/02/2024 6.0     Protein, Urine 02/02/2024 NEGATIVE     Glucose, Urine 02/02/2024 NEGATIVE     Blood, Urine 02/02/2024 SMALL (1+) (A)     Ketones, Urine 02/02/2024 NEGATIVE     Bilirubin, Urine 02/02/2024 NEGATIVE     Urobilinogen, Urine 02/02/2024 <2.0     Nitrite, Urine 02/02/2024 NEGATIVE     Leukocyte Esterase, Urine 02/02/2024 LARGE (3+) (A)     WBC, Urine 02/02/2024 >50 (A)     RBC, Urine 02/02/2024 6-10 (A)     Squamous Epithelial Cell* 02/02/2024 1-9 (SPARSE)     Bacteria, Urine 02/02/2024 3+ (A)     Hyaline Casts, Urine 02/02/2024 2+ (A)        ECG:  Normal sinus rhythm at 65 bpm NH interval 140 ms right bundle branch block with QRS duration 140 ms QTc 490 ms    Assessment/plan:  Continue current regimen.  Blood work.    Problem List Items Addressed This Visit    None

## 2024-04-17 ENCOUNTER — OFFICE VISIT (OUTPATIENT)
Dept: OTOLARYNGOLOGY | Facility: CLINIC | Age: 74
End: 2024-04-17
Payer: MEDICARE

## 2024-04-17 ENCOUNTER — HOSPITAL ENCOUNTER (OUTPATIENT)
Dept: RADIOLOGY | Facility: CLINIC | Age: 74
Discharge: HOME | End: 2024-04-17
Payer: MEDICARE

## 2024-04-17 ENCOUNTER — CLINICAL SUPPORT (OUTPATIENT)
Dept: AUDIOLOGY | Facility: CLINIC | Age: 74
End: 2024-04-17
Payer: MEDICARE

## 2024-04-17 VITALS — WEIGHT: 260 LBS | BODY MASS INDEX: 46.07 KG/M2 | HEIGHT: 63 IN

## 2024-04-17 DIAGNOSIS — H93.13 TINNITUS OF BOTH EARS: ICD-10-CM

## 2024-04-17 DIAGNOSIS — R42 DIZZINESS: ICD-10-CM

## 2024-04-17 DIAGNOSIS — H90.3 SENSORINEURAL HEARING LOSS (SNHL) OF BOTH EARS: Primary | ICD-10-CM

## 2024-04-17 DIAGNOSIS — Z12.31 VISIT FOR SCREENING MAMMOGRAM: ICD-10-CM

## 2024-04-17 DIAGNOSIS — H61.23 BILATERAL IMPACTED CERUMEN: Primary | ICD-10-CM

## 2024-04-17 DIAGNOSIS — H90.3 ASYMMETRICAL SENSORINEURAL HEARING LOSS: ICD-10-CM

## 2024-04-17 PROCEDURE — 77067 SCR MAMMO BI INCL CAD: CPT | Performed by: STUDENT IN AN ORGANIZED HEALTH CARE EDUCATION/TRAINING PROGRAM

## 2024-04-17 PROCEDURE — 77063 BREAST TOMOSYNTHESIS BI: CPT | Performed by: STUDENT IN AN ORGANIZED HEALTH CARE EDUCATION/TRAINING PROGRAM

## 2024-04-17 PROCEDURE — 1036F TOBACCO NON-USER: CPT | Performed by: OTOLARYNGOLOGY

## 2024-04-17 PROCEDURE — 4010F ACE/ARB THERAPY RXD/TAKEN: CPT | Performed by: OTOLARYNGOLOGY

## 2024-04-17 PROCEDURE — 1160F RVW MEDS BY RX/DR IN RCRD: CPT | Performed by: OTOLARYNGOLOGY

## 2024-04-17 PROCEDURE — 99204 OFFICE O/P NEW MOD 45 MIN: CPT | Performed by: OTOLARYNGOLOGY

## 2024-04-17 PROCEDURE — 3051F HG A1C>EQUAL 7.0%<8.0%: CPT | Performed by: OTOLARYNGOLOGY

## 2024-04-17 PROCEDURE — 1157F ADVNC CARE PLAN IN RCRD: CPT | Performed by: OTOLARYNGOLOGY

## 2024-04-17 PROCEDURE — 92550 TYMPANOMETRY & REFLEX THRESH: CPT | Performed by: AUDIOLOGIST

## 2024-04-17 PROCEDURE — 69210 REMOVE IMPACTED EAR WAX UNI: CPT | Performed by: OTOLARYNGOLOGY

## 2024-04-17 PROCEDURE — 1159F MED LIST DOCD IN RCRD: CPT | Performed by: OTOLARYNGOLOGY

## 2024-04-17 PROCEDURE — 92557 COMPREHENSIVE HEARING TEST: CPT | Performed by: AUDIOLOGIST

## 2024-04-17 PROCEDURE — 77067 SCR MAMMO BI INCL CAD: CPT

## 2024-04-17 ASSESSMENT — ENCOUNTER SYMPTOMS
OCCASIONAL FEELINGS OF UNSTEADINESS: 1
LOSS OF SENSATION IN FEET: 1
DEPRESSION: 1

## 2024-04-17 NOTE — PROGRESS NOTES
"History Of Present Illness  Dina Mullins is a 73 y.o. female presenting with: \"Dizziness/tinnitus\".  She is kindly referred by Ken Saleh PA-C.    She has dizziness since end of December 2023.   It is off and on.  It may happen few times a week, it got better.  She takes a meclizine and takes a nap, it goes away.  Head body movements can trigger the dizziness (dressing, climbing stairs).    She had a vertigo attack 10 years ago. Lasted for about 6 days.  Tinnitus for 10 years. Worse in left ear at night.  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  She was diagnosed with parkinson's at the end of 2022.  No change in her hearing subjectively.    There was wax in your canals bilaterally.  Cleaning was done.  Tympanic members look intact bilaterally.  Amador-Hallpike maneuver has shown dizziness at right.  It was normal at left. Right epley maneuver was performed today in the office (it was not ideal).    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    Plan:  1- Follow up in one week  2- consider MRI IAC for asymmetrical guerrero loss     Past Medical History  She has no past medical history on file.    Surgical History  She has no past surgical history on file.     Social History  She reports that she has never smoked. She has never been exposed to tobacco smoke. She has never used smokeless tobacco. She reports that she does not drink alcohol and does not use drugs.    Family History  Family History   Problem Relation Name Age of Onset    Cancer Mother      Breast cancer Mother  69    Alzheimer's disease Father          Allergies  Codeine    Review of Systems   Feeling poorly  Feeling tired  Eyes itch  Ear pain  Vertigo  Tinnitus  Dizziness upon standing  Leg pain  Leg swelling  Cough  Coughing up sputum  Joint pain  Muscle pain  Joint swelling  Limb pain  Limb swelling  Depression  Muscle weakness     Physical " "Exam    General appearance: Healthy-appearing, well-nourished, well groomed, in no acute distress.     Head and Face: Atraumatic with no masses, lesions, or scarring.      Salivary glands: No tenderness of the parotid glands or parotid masses.     No tenderness of the submandibular glands or submandibular masses.      Facial strength: Normal strength and symmetry, no synkinesis or facial tic.     Eyes: Conjunctivas look non-hyperemic bilaterally    Ears: Bilaterally wax was cleaned,  ear canals look normal. Tympanic membranes look intact, no hyperemia, fluid or retraction.     Nose: Mucosa looks normal. No purulent discharge.     Oral Cavity/Mouth: Lips and tongue look normal.     Throat: No postnasal discharge. No tonsil hypertrophy. No hyperemia.    Neck: Symmetrical, trachea midline.     Pulmonary: Normal respiratory effort.     Lymphatic: No palpable pathologic lymph nodes at neck.     Neurological/Psychiatric Orientation to person, place, and time: Normal.     Mood and affect: Normal.      Extremities: No clubbing.     Skin: No significant skin lesions were noted at face or neck        Procedure  EAR WAX REMOVAL 04.17.2024  Patient had bilateral ear wax. Using small instrument(s) and/or suction cleaning was done. Patient tolerated the procedure well.          Last Recorded Vitals  There were no vitals taken for this visit.    Relevant Results    Assessment and Plan:  Dina Mullins is a 73 y.o. female presenting with: \"Dizziness/tinnitus\".  She is kindly referred by Ken Saleh PA-C.    She has dizziness since end of December 2023.   It is off and on.  It may happen few times a week, it got better.  She takes a meclizine and takes a nap, it goes away.  Head body movements can trigger the dizziness (dressing, climbing stairs).    She had a vertigo attack 10 years ago. Lasted for about 6 days.  Tinnitus for 10 years. Worse in left ear at night.  Ear fullness (-)    Her hearing test shows asymmetrical SN " hearing loss in her right ear at high frequencies (15-25 dB).  She was diagnosed with parkinson's at the end of 2022.  No change in her hearing subjectively.    There was wax in your canals bilaterally.  Cleaning was done.  Tympanic members look intact bilaterally.  Amador-Hallpike maneuver has shown dizziness at right.  It was normal at left. Right epley maneuver was performed today in the office (it was not ideal).    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    Plan:  1- Follow up in one week  2- consider MRI IAC for asymmetrical guerrero loss    Ji Padgett  Otolaryngology - Head & Neck Surgery

## 2024-04-17 NOTE — PROGRESS NOTES
AUDIOLOGY ADULT AUDIOMETRIC EVALUATION    Name:  Dina Mullins  :  1950  Age:  73 y.o.  Date of Evaluation:  2024    Reason for visit: Ms. Mullins is seen in the clinic today at the request of otolaryngology for an audiologic evaluation.     HISTORY  Patient complains of dizziness since 2023 , long term tinnitus and did not really feel she had any  hearing loss.  Last audio 10 years ago slight drop in right at some frequencies.    EVALUATION  See scanned audiogram: “Media” > “Audiology Report”.          RESULTS  Otoscopic Evaluation:  Right Ear: clear ear canal  Left Ear: slight wax in ear canal    Immittance Measures:  Tympanometry:  Right Ear:  Type A, normal tympanic membrane mobility with normal middle ear pressure   Left Ear: Type A, normal tympanic membrane mobility with normal middle ear pressure     Acoustic Reflexes:  Ipsilateral Right Ear: 100 100 100 100   Ipsilateral Left Ear:    NR  100   Contralateral Right Ear: did not evaluate  Contralateral Left Ear: did not evaluate    Distortion Product Otoacoustic Emissions (DPOAEs):  Right Ear:  PASS 1.5K HZ ONLY    REFER:  1,2,3,4,5,6,8K HZ  Left Ear:     PASS: 1, 1.5, 2, 3 K HZ  REFER: 4-8 K HZ     Audiometry:  Test Technique and Reliability:  BEHAVIORAL   Standard audiometry via supra-aural headphones. Reliability is good.    Pure tone air and bone conduction audiometry:  Right Ear: WITHIN NORMAL LIMITS TO 1500 HZ WITH A MILD MODERATE SNHL AT 2- 8 K HZ.  ASYMMETRY NOTED   Left Ear:  WITHIN NORMAL LIMTS TO 3 KHZ MILD AT 4 AND 8 K HZ NORMAL AT 6 K HZ.    Speech Audiometry (Word Recognition Scores):   Right Ear:  88% GOOD  Left Ear:    100% EXCELLENT    IMPRESSIONS  ASYMMETRIC RIGHT OVER LEFT SNHL WITH GOOD WRS AU AND DIZZY AND TINNITUS.    The presence of acoustic reflexes within normal intensity limits is consistent with normal middle ear and brainstem function, and suggests that auditory sensitivity is not  significantly impaired. An elevated or absent acoustic reflex threshold is consistent with a middle ear disorder, hearing loss in the stimulated ear, and/or interruption of neural innervation of the stapedius muscle. Present DPOAEs suggest normal/near normal cochlear outer hair cell function and are consistent with no greater than a mild hearing loss at those frequencies. Absent DPOAEs are consistent with abnormal cochlear outer hair cell function and some degree of hearing loss at those frequencies.    RECOMMENDATIONS  - Follow up with otolaryngology today as scheduled. Special tests as needed or recommended by ENT for asymmetry and dizziness and tinnitus.  - Audiologic evaluation as needed.  - Annual audiologic evaluation, sooner if an acute change is noted.  - Audiologic evaluation in conjunction with otologic care, if an acute change is noted, and/or annually.  - Consider hearing aids.  RIGHT EAR ONLY CONSIDERED AND DISCUSSED BRIEFLY.Contact insurance to determine if there is an applicable benefit and where it can be used. Contact our office to schedule an appointment should she wish to proceed with hearing aids through our clinic.  - Consider hearing aids. Contact insurance to determine if there is an applicable benefit and where it can be used.  - Follow-up with audiology annually for routine hearing aid maintenance, sooner if questions/problems arise.  - Follow-up with medical care team as planned.    PATIENT EDUCATION  Discussed results, impressions and recommendations with the patient. Questions were addressed and the patient was encouraged to contact our office should concerns arise.    Time for this encounter: 35 minutes     Payam Loaiza  Licensed Audiologist

## 2024-04-23 DIAGNOSIS — R92.8 ABNORMAL MAMMOGRAM: Primary | ICD-10-CM

## 2024-04-24 ENCOUNTER — APPOINTMENT (OUTPATIENT)
Dept: OTOLARYNGOLOGY | Facility: CLINIC | Age: 74
End: 2024-04-24
Payer: MEDICARE

## 2024-05-06 PROBLEM — R92.8 ABNORMAL MAMMOGRAM: Status: ACTIVE | Noted: 2024-05-06

## 2024-05-06 NOTE — PROGRESS NOTES
Subjective   Patient ID:   Dina Mullins is a 73 y.o. female who presents for No chief complaint on file..  HPI  Dizziness:  I referred to ENT and vestibular therapy last visit.  She had recently been started on Dopamine by neurology when this started.    HTN/A-fib:  Taking Lasix, Lisinopril, Xarelto.  Seeing cardiology.  BP today is   Denies headaches.    Diabetes:  Taking Lantus 26 units daily, Rybelsus.  Last A1C was 7.7 in Feb 2024.  POC today is    Dizziness/Tinnitus:  See above.  Taking Meclizine which helps.  She did have a normal brain MRI.  She does see neurology.  Does not follow with ENT.  Causing everyday struggles right now.    HLD:  Taking Atorvastatin.  Last checked Aug 2023.    Anxiety/Depression:  Taking Cymbalta.  Denies SI/HI.    Parkinson's:  Taking Dopamine now.  Seeing neurology.    Health maintenance:  Smoking: Never a smoker.  Mammogram (40-75): Abnormal screening mammo in April 2024. I ordered a diagnostic to be done.  Labs: Aug 2023. DUE for A1C.  Colonoscopy (50-75): 2014  DEXA (65+, q 2 years): Dec 2021.  Prevnar 13 (65+):  Pneumovax 23 (65+, 1 year after Prev 13):  Influenza:  Zostavax (60+, 1 time, at pharmacy):    Review of Systems  12 point review of systems negative unless stated above in HPI    There were no vitals filed for this visit.    Physical Exam  General: Alert and oriented, well nourished, no acute distress.  Lungs: Clear to auscultation, non-labored respiration.  Heart: Normal rate, regular rhythm, no murmur, gallop or edema.  Neurologic: Awake, alert, and oriented X3, CN II-XII intact.  Psychiatric: Cooperative, appropriate mood and affect.    Assessment/Plan     I have placed a referral to ENT for further management.  I have also placed a referral for vestibular therapy for the dizziness.  Please work on increasing your water intake.  Continue the Meclizine.  I have ordered you a mammogram to be done as soon as you are able.  We will call the results.    Diagnoses  and all orders for this visit:  Medicare annual wellness visit, subsequent  Depression screening  Dizziness  Paroxysmal atrial fibrillation (Multi)  Benign essential HTN  Pure hypercholesterolemia  Parkinson's disease, unspecified whether dyskinesia present, unspecified whether manifestations fluctuate (Multi)  Obesity, morbid (Multi)  Abnormal mammogram  Longstanding persistent atrial fibrillation (Multi)  Mixed anxiety and depressive disorder  Type 2 diabetes mellitus with obesity (Multi)  -     POCT glycosylated hemoglobin (Hb A1C) manually resulted

## 2024-05-09 ENCOUNTER — HOSPITAL ENCOUNTER (OUTPATIENT)
Dept: RADIOLOGY | Facility: HOSPITAL | Age: 74
Discharge: HOME | End: 2024-05-09
Payer: MEDICARE

## 2024-05-09 DIAGNOSIS — N63.0 MASS OF BREAST, UNSPECIFIED LATERALITY: Primary | ICD-10-CM

## 2024-05-09 DIAGNOSIS — M19.019 SHOULDER ARTHRITIS: ICD-10-CM

## 2024-05-09 DIAGNOSIS — R92.8 ABNORMAL MAMMOGRAM: ICD-10-CM

## 2024-05-09 DIAGNOSIS — R92.8 OTHER ABNORMAL AND INCONCLUSIVE FINDINGS ON DIAGNOSTIC IMAGING OF BREAST: ICD-10-CM

## 2024-05-09 PROCEDURE — 77061 BREAST TOMOSYNTHESIS UNI: CPT | Mod: RT

## 2024-05-09 PROCEDURE — 76642 ULTRASOUND BREAST LIMITED: CPT | Mod: RIGHT SIDE | Performed by: RADIOLOGY

## 2024-05-09 PROCEDURE — 76982 USE 1ST TARGET LESION: CPT | Mod: RT

## 2024-05-09 PROCEDURE — 77065 DX MAMMO INCL CAD UNI: CPT | Mod: RIGHT SIDE | Performed by: RADIOLOGY

## 2024-05-09 PROCEDURE — 76642 ULTRASOUND BREAST LIMITED: CPT | Mod: RT

## 2024-05-09 PROCEDURE — G0279 TOMOSYNTHESIS, MAMMO: HCPCS | Mod: RIGHT SIDE | Performed by: RADIOLOGY

## 2024-05-09 NOTE — RESULT ENCOUNTER NOTE
Diagnostic mammogram is abnormal - I have placed a referral to interventional radiology for a breast biopsy. The patient is already aware of the results

## 2024-05-13 ENCOUNTER — OFFICE VISIT (OUTPATIENT)
Dept: PRIMARY CARE | Facility: CLINIC | Age: 74
End: 2024-05-13
Payer: MEDICARE

## 2024-05-13 VITALS
DIASTOLIC BLOOD PRESSURE: 78 MMHG | HEART RATE: 64 BPM | OXYGEN SATURATION: 96 % | WEIGHT: 274 LBS | BODY MASS INDEX: 48.55 KG/M2 | HEIGHT: 63 IN | SYSTOLIC BLOOD PRESSURE: 124 MMHG

## 2024-05-13 DIAGNOSIS — I10 BENIGN ESSENTIAL HTN: ICD-10-CM

## 2024-05-13 DIAGNOSIS — E11.69 TYPE 2 DIABETES MELLITUS WITH OBESITY (MULTI): ICD-10-CM

## 2024-05-13 DIAGNOSIS — I48.11 LONGSTANDING PERSISTENT ATRIAL FIBRILLATION (MULTI): ICD-10-CM

## 2024-05-13 DIAGNOSIS — R92.8 ABNORMAL MAMMOGRAM: ICD-10-CM

## 2024-05-13 DIAGNOSIS — Z00.00 ROUTINE GENERAL MEDICAL EXAMINATION AT HEALTH CARE FACILITY: ICD-10-CM

## 2024-05-13 DIAGNOSIS — M25.512 ACUTE PAIN OF LEFT SHOULDER: ICD-10-CM

## 2024-05-13 DIAGNOSIS — Z13.31 DEPRESSION SCREENING: ICD-10-CM

## 2024-05-13 DIAGNOSIS — E78.00 PURE HYPERCHOLESTEROLEMIA: ICD-10-CM

## 2024-05-13 DIAGNOSIS — E66.01 OBESITY, MORBID (MULTI): ICD-10-CM

## 2024-05-13 DIAGNOSIS — G20.A1 PARKINSON'S DISEASE, UNSPECIFIED WHETHER DYSKINESIA PRESENT, UNSPECIFIED WHETHER MANIFESTATIONS FLUCTUATE (MULTI): ICD-10-CM

## 2024-05-13 DIAGNOSIS — E66.9 TYPE 2 DIABETES MELLITUS WITH OBESITY (MULTI): ICD-10-CM

## 2024-05-13 DIAGNOSIS — N18.32 STAGE 3B CHRONIC KIDNEY DISEASE (MULTI): ICD-10-CM

## 2024-05-13 DIAGNOSIS — I48.0 PAROXYSMAL ATRIAL FIBRILLATION (MULTI): ICD-10-CM

## 2024-05-13 DIAGNOSIS — F41.8 MIXED ANXIETY AND DEPRESSIVE DISORDER: ICD-10-CM

## 2024-05-13 DIAGNOSIS — R42 DIZZINESS: ICD-10-CM

## 2024-05-13 DIAGNOSIS — E78.5 HYPERLIPIDEMIA, UNSPECIFIED HYPERLIPIDEMIA TYPE: ICD-10-CM

## 2024-05-13 DIAGNOSIS — Z00.00 MEDICARE ANNUAL WELLNESS VISIT, SUBSEQUENT: Primary | ICD-10-CM

## 2024-05-13 PROBLEM — I87.2 VENOUS STASIS DERMATITIS: Status: ACTIVE | Noted: 2023-10-20

## 2024-05-13 PROBLEM — N39.0 ACUTE LOWER URINARY TRACT INFECTION: Status: ACTIVE | Noted: 2024-05-13

## 2024-05-13 PROBLEM — R73.09 BLOOD GLUCOSE ABNORMAL: Status: ACTIVE | Noted: 2024-05-13

## 2024-05-13 PROBLEM — R51.9 HEADACHE: Status: ACTIVE | Noted: 2024-05-13

## 2024-05-13 PROBLEM — M25.529 ELBOW PAIN: Status: ACTIVE | Noted: 2024-05-13

## 2024-05-13 PROBLEM — G89.29 CHRONIC PAIN: Status: ACTIVE | Noted: 2024-05-13

## 2024-05-13 PROBLEM — R53.1 WEAKNESS: Status: ACTIVE | Noted: 2024-05-13

## 2024-05-13 PROBLEM — H93.13 TINNITUS OF BOTH EARS: Status: ACTIVE | Noted: 2024-05-13

## 2024-05-13 PROBLEM — R25.1 TREMOR: Status: ACTIVE | Noted: 2024-05-13

## 2024-05-13 LAB — POC HEMOGLOBIN A1C: 7.7 % (ref 4.2–6.5)

## 2024-05-13 PROCEDURE — 3074F SYST BP LT 130 MM HG: CPT | Performed by: PHYSICIAN ASSISTANT

## 2024-05-13 PROCEDURE — 3051F HG A1C>EQUAL 7.0%<8.0%: CPT | Performed by: PHYSICIAN ASSISTANT

## 2024-05-13 PROCEDURE — 83036 HEMOGLOBIN GLYCOSYLATED A1C: CPT | Mod: QW | Performed by: PHYSICIAN ASSISTANT

## 2024-05-13 PROCEDURE — 1170F FXNL STATUS ASSESSED: CPT | Performed by: PHYSICIAN ASSISTANT

## 2024-05-13 PROCEDURE — G0439 PPPS, SUBSEQ VISIT: HCPCS | Performed by: PHYSICIAN ASSISTANT

## 2024-05-13 PROCEDURE — 99397 PER PM REEVAL EST PAT 65+ YR: CPT | Performed by: PHYSICIAN ASSISTANT

## 2024-05-13 PROCEDURE — 1160F RVW MEDS BY RX/DR IN RCRD: CPT | Performed by: PHYSICIAN ASSISTANT

## 2024-05-13 PROCEDURE — 99213 OFFICE O/P EST LOW 20 MIN: CPT | Performed by: PHYSICIAN ASSISTANT

## 2024-05-13 PROCEDURE — 99215 OFFICE O/P EST HI 40 MIN: CPT | Performed by: PHYSICIAN ASSISTANT

## 2024-05-13 PROCEDURE — 1159F MED LIST DOCD IN RCRD: CPT | Performed by: PHYSICIAN ASSISTANT

## 2024-05-13 PROCEDURE — 3078F DIAST BP <80 MM HG: CPT | Performed by: PHYSICIAN ASSISTANT

## 2024-05-13 PROCEDURE — G0444 DEPRESSION SCREEN ANNUAL: HCPCS | Performed by: PHYSICIAN ASSISTANT

## 2024-05-13 PROCEDURE — 1036F TOBACCO NON-USER: CPT | Performed by: PHYSICIAN ASSISTANT

## 2024-05-13 PROCEDURE — 1157F ADVNC CARE PLAN IN RCRD: CPT | Performed by: PHYSICIAN ASSISTANT

## 2024-05-13 PROCEDURE — 4010F ACE/ARB THERAPY RXD/TAKEN: CPT | Performed by: PHYSICIAN ASSISTANT

## 2024-05-13 PROCEDURE — 1125F AMNT PAIN NOTED PAIN PRSNT: CPT | Performed by: PHYSICIAN ASSISTANT

## 2024-05-13 PROCEDURE — G2211 COMPLEX E/M VISIT ADD ON: HCPCS | Performed by: PHYSICIAN ASSISTANT

## 2024-05-13 RX ORDER — DULOXETIN HYDROCHLORIDE 20 MG/1
20 CAPSULE, DELAYED RELEASE ORAL DAILY
Qty: 90 CAPSULE | Refills: 1 | Status: SHIPPED | OUTPATIENT
Start: 2024-05-13 | End: 2024-11-09

## 2024-05-13 RX ORDER — LISINOPRIL 10 MG/1
10 TABLET ORAL DAILY
Qty: 90 TABLET | Refills: 1 | Status: SHIPPED | OUTPATIENT
Start: 2024-05-13 | End: 2024-11-09

## 2024-05-13 RX ORDER — INSULIN GLARGINE 100 [IU]/ML
25 INJECTION, SOLUTION SUBCUTANEOUS EVERY MORNING
Qty: 9 ML | Refills: 2 | Status: SHIPPED | OUTPATIENT
Start: 2024-05-13

## 2024-05-13 RX ORDER — ATORVASTATIN CALCIUM 40 MG/1
40 TABLET, FILM COATED ORAL NIGHTLY
Qty: 90 TABLET | Refills: 1 | Status: SHIPPED | OUTPATIENT
Start: 2024-05-13

## 2024-05-13 RX ORDER — METHOCARBAMOL 750 MG/1
750 TABLET, FILM COATED ORAL 3 TIMES DAILY
Qty: 90 TABLET | Refills: 0 | Status: SHIPPED | OUTPATIENT
Start: 2024-05-13 | End: 2024-06-12

## 2024-05-13 RX ORDER — TALC
3 POWDER (GRAM) TOPICAL NIGHTLY PRN
COMMUNITY
End: 2024-05-13 | Stop reason: ALTCHOICE

## 2024-05-13 RX ORDER — MECLIZINE HYDROCHLORIDE 25 MG/1
25 TABLET ORAL 3 TIMES DAILY PRN
Qty: 90 TABLET | Refills: 2 | Status: SHIPPED | OUTPATIENT
Start: 2024-05-13

## 2024-05-13 RX ORDER — FUROSEMIDE 40 MG/1
40 TABLET ORAL DAILY
Qty: 90 TABLET | Refills: 1 | Status: SHIPPED | OUTPATIENT
Start: 2024-05-13 | End: 2024-11-09

## 2024-05-13 ASSESSMENT — ACTIVITIES OF DAILY LIVING (ADL)
DRESSING: INDEPENDENT
TAKING_MEDICATION: INDEPENDENT
MANAGING_FINANCES: INDEPENDENT
BATHING: INDEPENDENT
GROCERY_SHOPPING: INDEPENDENT
DOING_HOUSEWORK: INDEPENDENT

## 2024-05-13 ASSESSMENT — PATIENT HEALTH QUESTIONNAIRE - PHQ9
2. FEELING DOWN, DEPRESSED OR HOPELESS: SEVERAL DAYS
1. LITTLE INTEREST OR PLEASURE IN DOING THINGS: SEVERAL DAYS
10. IF YOU CHECKED OFF ANY PROBLEMS, HOW DIFFICULT HAVE THESE PROBLEMS MADE IT FOR YOU TO DO YOUR WORK, TAKE CARE OF THINGS AT HOME, OR GET ALONG WITH OTHER PEOPLE: SOMEWHAT DIFFICULT
SUM OF ALL RESPONSES TO PHQ9 QUESTIONS 1 AND 2: 2

## 2024-05-13 ASSESSMENT — ENCOUNTER SYMPTOMS
LOSS OF SENSATION IN FEET: 0
OCCASIONAL FEELINGS OF UNSTEADINESS: 0
DEPRESSION: 0

## 2024-05-13 ASSESSMENT — PAIN SCALES - GENERAL: PAINLEVEL: 10-WORST PAIN EVER

## 2024-05-13 NOTE — PROGRESS NOTES
"Subjective   Reason for Visit: Dina Mullins is an 73 y.o. female here for a Medicare Wellness visit.     Past Medical, Surgical, and Family History reviewed and updated in chart.         hospitals    Patient Care Team:  Ken Saleh PA-C as PCP - General (Internal Medicine)     Left arm pain:  Symptoms x a couple weeks.  No acute injury.  Difficulty with raising her arm.  No elbow/wrist/neck pains.  Mostly in the shoulder area.    Abnormal mammogram:  Screening and diagnostic mammograms were abnormal in May 2024.  I referred to interventional radiology for a biopsy.  She is in the process of scheduling this.    Dizziness:  I referred to ENT and vestibular therapy last visit.  She had recently been started on Dopamine by neurology when this started.    HTN/A-fib:  Taking Lasix, Lisinopril, Xarelto.  Seeing cardiology.  BP today is 124/78.  Denies headaches.    Diabetes:  Taking Lantus 26 units daily, Rybelsus.  Last A1C was 7.7 in Feb 2024.  POC today is 7.7.    Dizziness/Tinnitus:  See above.  Taking Meclizine which helps.  She did have a normal brain MRI.  She does see neurology.  Does not follow with ENT.  Causing everyday struggles right now.    HLD:  Taking Atorvastatin.  Last checked Aug 2023.    Anxiety/Depression:  Taking Cymbalta.  Denies SI/HI.    Parkinson's:  Taking Dopamine now.  Seeing neurology.    Stage III CKD:  Creatinine is stable in May 2024.    Health maintenance:  Smoking: Never a smoker.  Mammogram (40-75): Abnormal screening/diagnostic mammo in April 2024. I ordered a biopsy to be done through interventional radiology.  Labs: Aug 2023. DUE for A1C.  Colonoscopy (50-75): 2014  DEXA (65+, q 2 years): Dec 2021.  Prevnar 13 (65+):  Pneumovax 23 (65+, 1 year after Prev 13):  Influenza:  Zostavax (60+, 1 time, at pharmacy):    Review of Systems  12 point review of systems negative unless stated above in HPI    Objective   Vitals:  /78   Pulse 64   Ht 1.6 m (5' 3\")   Wt 124 kg (274 lb)  "  SpO2 96%   BMI 48.54 kg/m²       Physical Exam  General: Alert and oriented, well nourished, no acute distress.  Lungs: Clear to auscultation, non-labored respiration.  Heart: Normal rate, regular rhythm, no murmur, gallop or edema.  Neurologic: Awake, alert, and oriented X3, CN II-XII intact.  Psychiatric: Cooperative, appropriate mood and affect.  Musc: Limited flexion/extension of left shoulder.    Assessment/Plan   It was good seeing you!  This counts as your annual Medicare Wellness visit.  Health maintenance was reviewed today.  Depression screening is positive - currently being treated for this.  Medications were refilled.  Please certainly pursue that breast biopsy.  Your A1C is stable - continue doing what you are doing.  I have ordered a left shoulder x-ray to be done.  We will call the results.  I have sent in Robaxin to try for now.  Consider PT.  Continue specialty care.  If symptoms persist or worsen despite current plan of care, please contact your healthcare provider for further evaluation.  Patient instructed to contact the office if there are any questions regarding their care or treatment.   [facility] [phone]  Continue the same medications.  Chronic conditions are stable.  Call with questions or concerns.    Follow up  3-4 months  Problem List Items Addressed This Visit          Cardiac and Vasculature    Benign essential HTN    Relevant Medications    furosemide (Lasix) 40 mg tablet    lisinopril 10 mg tablet    Hyperlipidemia    Relevant Medications    atorvastatin (Lipitor) 40 mg tablet    Paroxysmal atrial fibrillation (Multi)    Atrial fibrillation (Multi)       Endocrine/Metabolic    Type 2 diabetes mellitus with obesity (Multi)    Relevant Medications    Lantus Solostar U-100 Insulin 100 unit/mL (3 mL) pen    Other Relevant Orders    POCT glycosylated hemoglobin (Hb A1C) manually resulted (Completed)    Obesity, morbid (Multi)       Genitourinary and  Reproductive    Abnormal mammogram    Stage 3b chronic kidney disease (Multi)       Mental Health    Mixed anxiety and depressive disorder    Relevant Medications    DULoxetine (Cymbalta) 20 mg DR capsule       Musculoskeletal and Injuries    Left shoulder pain    Relevant Medications    methocarbamol (Robaxin) 750 mg tablet    Other Relevant Orders    XR shoulder left 2+ views (Completed)       Neuro    Parkinsons disease (Multi)       Symptoms and Signs    Dizziness    Relevant Medications    meclizine (Antivert) 25 mg tablet     Other Visit Diagnoses       Medicare annual wellness visit, subsequent    -  Primary    Depression screening        Routine general medical examination at health care facility

## 2024-05-15 ENCOUNTER — HOSPITAL ENCOUNTER (OUTPATIENT)
Dept: RADIOLOGY | Facility: CLINIC | Age: 74
Discharge: HOME | End: 2024-05-15
Payer: MEDICARE

## 2024-05-15 DIAGNOSIS — M25.512 ACUTE PAIN OF LEFT SHOULDER: ICD-10-CM

## 2024-05-15 PROCEDURE — 73030 X-RAY EXAM OF SHOULDER: CPT | Mod: LT

## 2024-05-15 PROCEDURE — 73030 X-RAY EXAM OF SHOULDER: CPT | Mod: LEFT SIDE | Performed by: STUDENT IN AN ORGANIZED HEALTH CARE EDUCATION/TRAINING PROGRAM

## 2024-05-15 NOTE — LETTER
May 15, 2024     Dina Mullins  [address]  [place] [address]      Dear Ms. Mullins:    Below are the results from your recent visit:    Moderate osteoarthritis in her shoulder. Would benefit from seeing orthopedics  - referral is in   (Please call [phone] to schedule with Orthopedics)    Resulted Orders   XR shoulder left 2+ views    Narrative    Interpreted By:  Huber Clark,   STUDY:  XR SHOULDER LEFT 2+ VIEWS; ;  5/15/2024 2:30 pm      INDICATION:  Signs/Symptoms:Left shoulder pain.      COMPARISON:  None.      ACCESSION NUMBER(S):  [id number]      ORDERING CLINICIAN:  KEN SULLIVAN      FINDINGS:  Three views of the left shoulder pain      No acute fracture. No dislocation. Moderate acromioclavicular and  mild glenohumeral osteoarthrosis. The soft tissues are unremarkable.        Impression    Moderate acromioclavicular and mild glenohumeral osteoarthrosis.      MACRO:  None      Signed by: Huber Clark 5/15/2024 3:26 PM  Dictation workstation:   [id number]     If you have any questions or concerns, please don't hesitate to call.         Sincerely,    Ken Sullivan PA-C

## 2024-05-23 ENCOUNTER — APPOINTMENT (OUTPATIENT)
Dept: CARDIOLOGY | Facility: HOSPITAL | Age: 74
End: 2024-05-23
Payer: MEDICARE

## 2024-05-23 ENCOUNTER — APPOINTMENT (OUTPATIENT)
Dept: RADIOLOGY | Facility: HOSPITAL | Age: 74
End: 2024-05-23
Payer: MEDICARE

## 2024-05-23 ENCOUNTER — HOSPITAL ENCOUNTER (EMERGENCY)
Facility: HOSPITAL | Age: 74
Discharge: HOME | End: 2024-05-23
Payer: MEDICARE

## 2024-05-23 VITALS
SYSTOLIC BLOOD PRESSURE: 141 MMHG | DIASTOLIC BLOOD PRESSURE: 68 MMHG | BODY MASS INDEX: 47.84 KG/M2 | HEIGHT: 63 IN | TEMPERATURE: 98.1 F | OXYGEN SATURATION: 94 % | HEART RATE: 64 BPM | WEIGHT: 270 LBS | RESPIRATION RATE: 18 BRPM

## 2024-05-23 DIAGNOSIS — R42 VERTIGO: Primary | ICD-10-CM

## 2024-05-23 DIAGNOSIS — N39.0 URINARY TRACT INFECTION WITHOUT HEMATURIA, SITE UNSPECIFIED: ICD-10-CM

## 2024-05-23 PROBLEM — M25.512 PAIN OF LEFT SHOULDER REGION: Status: ACTIVE | Noted: 2024-05-23

## 2024-05-23 LAB
ALBUMIN SERPL-MCNC: 4 G/DL (ref 3.5–5)
ALP BLD-CCNC: 50 U/L (ref 35–125)
ALT SERPL-CCNC: 11 U/L (ref 5–40)
ANION GAP SERPL CALC-SCNC: 10 MMOL/L
APPEARANCE UR: ABNORMAL
AST SERPL-CCNC: 12 U/L (ref 5–40)
BACTERIA #/AREA URNS AUTO: ABNORMAL /HPF
BASOPHILS # BLD AUTO: 0.02 X10*3/UL (ref 0–0.1)
BASOPHILS NFR BLD AUTO: 0.4 %
BILIRUB SERPL-MCNC: 0.7 MG/DL (ref 0.1–1.2)
BILIRUB UR STRIP.AUTO-MCNC: NEGATIVE MG/DL
BUN SERPL-MCNC: 28 MG/DL (ref 8–25)
CALCIUM SERPL-MCNC: 9.2 MG/DL (ref 8.5–10.4)
CHLORIDE SERPL-SCNC: 106 MMOL/L (ref 97–107)
CO2 SERPL-SCNC: 23 MMOL/L (ref 24–31)
COLOR UR: YELLOW
CREAT SERPL-MCNC: 1.2 MG/DL (ref 0.4–1.6)
EGFRCR SERPLBLD CKD-EPI 2021: 48 ML/MIN/1.73M*2
EOSINOPHIL # BLD AUTO: 0.17 X10*3/UL (ref 0–0.4)
EOSINOPHIL NFR BLD AUTO: 3.2 %
ERYTHROCYTE [DISTWIDTH] IN BLOOD BY AUTOMATED COUNT: 12.4 % (ref 11.5–14.5)
GLUCOSE SERPL-MCNC: 263 MG/DL (ref 65–99)
GLUCOSE UR STRIP.AUTO-MCNC: NORMAL MG/DL
HCT VFR BLD AUTO: 36.9 % (ref 36–46)
HGB BLD-MCNC: 11.8 G/DL (ref 12–16)
IMM GRANULOCYTES # BLD AUTO: 0.01 X10*3/UL (ref 0–0.5)
IMM GRANULOCYTES NFR BLD AUTO: 0.2 % (ref 0–0.9)
KETONES UR STRIP.AUTO-MCNC: NEGATIVE MG/DL
LEUKOCYTE ESTERASE UR QL STRIP.AUTO: ABNORMAL
LYMPHOCYTES # BLD AUTO: 1.06 X10*3/UL (ref 0.8–3)
LYMPHOCYTES NFR BLD AUTO: 20.1 %
MCH RBC QN AUTO: 29.7 PG (ref 26–34)
MCHC RBC AUTO-ENTMCNC: 32 G/DL (ref 32–36)
MCV RBC AUTO: 93 FL (ref 80–100)
MONOCYTES # BLD AUTO: 0.26 X10*3/UL (ref 0.05–0.8)
MONOCYTES NFR BLD AUTO: 4.9 %
MUCOUS THREADS #/AREA URNS AUTO: ABNORMAL /LPF
NEUTROPHILS # BLD AUTO: 3.76 X10*3/UL (ref 1.6–5.5)
NEUTROPHILS NFR BLD AUTO: 71.2 %
NITRITE UR QL STRIP.AUTO: ABNORMAL
NRBC BLD-RTO: 0 /100 WBCS (ref 0–0)
PH UR STRIP.AUTO: 6 [PH]
PLATELET # BLD AUTO: 197 X10*3/UL (ref 150–450)
POTASSIUM SERPL-SCNC: 4.1 MMOL/L (ref 3.4–5.1)
PROT SERPL-MCNC: 6.8 G/DL (ref 5.9–7.9)
PROT UR STRIP.AUTO-MCNC: ABNORMAL MG/DL
RBC # BLD AUTO: 3.97 X10*6/UL (ref 4–5.2)
RBC # UR STRIP.AUTO: ABNORMAL /UL
RBC #/AREA URNS AUTO: ABNORMAL /HPF
SODIUM SERPL-SCNC: 139 MMOL/L (ref 133–145)
SP GR UR STRIP.AUTO: 1.03
SQUAMOUS #/AREA URNS AUTO: ABNORMAL /HPF
TROPONIN T SERPL-MCNC: 26 NG/L
TROPONIN T SERPL-MCNC: 27 NG/L
UROBILINOGEN UR STRIP.AUTO-MCNC: NORMAL MG/DL
WBC # BLD AUTO: 5.3 X10*3/UL (ref 4.4–11.3)
WBC #/AREA URNS AUTO: >50 /HPF

## 2024-05-23 PROCEDURE — 71046 X-RAY EXAM CHEST 2 VIEWS: CPT

## 2024-05-23 PROCEDURE — 2500000004 HC RX 250 GENERAL PHARMACY W/ HCPCS (ALT 636 FOR OP/ED): Performed by: PHYSICIAN ASSISTANT

## 2024-05-23 PROCEDURE — 36415 COLL VENOUS BLD VENIPUNCTURE: CPT | Performed by: PHYSICIAN ASSISTANT

## 2024-05-23 PROCEDURE — 84484 ASSAY OF TROPONIN QUANT: CPT | Performed by: PHYSICIAN ASSISTANT

## 2024-05-23 PROCEDURE — 87086 URINE CULTURE/COLONY COUNT: CPT | Mod: WESLAB | Performed by: PHYSICIAN ASSISTANT

## 2024-05-23 PROCEDURE — 85025 COMPLETE CBC W/AUTO DIFF WBC: CPT | Performed by: PHYSICIAN ASSISTANT

## 2024-05-23 PROCEDURE — 70450 CT HEAD/BRAIN W/O DYE: CPT | Performed by: RADIOLOGY

## 2024-05-23 PROCEDURE — 70450 CT HEAD/BRAIN W/O DYE: CPT

## 2024-05-23 PROCEDURE — 71046 X-RAY EXAM CHEST 2 VIEWS: CPT | Performed by: RADIOLOGY

## 2024-05-23 PROCEDURE — 96365 THER/PROPH/DIAG IV INF INIT: CPT | Performed by: PHYSICIAN ASSISTANT

## 2024-05-23 PROCEDURE — 84075 ASSAY ALKALINE PHOSPHATASE: CPT | Performed by: PHYSICIAN ASSISTANT

## 2024-05-23 PROCEDURE — 2500000001 HC RX 250 WO HCPCS SELF ADMINISTERED DRUGS (ALT 637 FOR MEDICARE OP): Performed by: PHYSICIAN ASSISTANT

## 2024-05-23 PROCEDURE — 81001 URINALYSIS AUTO W/SCOPE: CPT | Performed by: PHYSICIAN ASSISTANT

## 2024-05-23 PROCEDURE — 93005 ELECTROCARDIOGRAM TRACING: CPT

## 2024-05-23 PROCEDURE — 99285 EMERGENCY DEPT VISIT HI MDM: CPT | Mod: 25 | Performed by: PHYSICIAN ASSISTANT

## 2024-05-23 RX ORDER — CEFTRIAXONE 1 G/50ML
1 INJECTION, SOLUTION INTRAVENOUS ONCE
Status: COMPLETED | OUTPATIENT
Start: 2024-05-23 | End: 2024-05-23

## 2024-05-23 RX ORDER — CEPHALEXIN 500 MG/1
500 CAPSULE ORAL 3 TIMES DAILY
Qty: 21 CAPSULE | Refills: 0 | Status: SHIPPED | OUTPATIENT
Start: 2024-05-23 | End: 2024-05-30

## 2024-05-23 RX ORDER — ONDANSETRON HYDROCHLORIDE 2 MG/ML
4 INJECTION, SOLUTION INTRAVENOUS ONCE
Status: DISCONTINUED | OUTPATIENT
Start: 2024-05-23 | End: 2024-05-24 | Stop reason: HOSPADM

## 2024-05-23 RX ORDER — MECLIZINE HYDROCHLORIDE 25 MG/1
25 TABLET ORAL 3 TIMES DAILY PRN
Qty: 20 TABLET | Refills: 0 | Status: SHIPPED | OUTPATIENT
Start: 2024-05-23

## 2024-05-23 RX ORDER — MECLIZINE HYDROCHLORIDE 25 MG/1
50 TABLET ORAL ONCE
Status: COMPLETED | OUTPATIENT
Start: 2024-05-23 | End: 2024-05-23

## 2024-05-23 RX ORDER — LORATADINE 10 MG/1
10 TABLET ORAL ONCE
Status: COMPLETED | OUTPATIENT
Start: 2024-05-23 | End: 2024-05-23

## 2024-05-23 RX ORDER — ONDANSETRON 4 MG/1
4 TABLET, ORALLY DISINTEGRATING ORAL EVERY 8 HOURS PRN
Qty: 20 TABLET | Refills: 0 | Status: SHIPPED | OUTPATIENT
Start: 2024-05-23 | End: 2024-05-30

## 2024-05-23 RX ADMIN — CEFTRIAXONE SODIUM 1 G: 1 INJECTION, SOLUTION INTRAVENOUS at 21:41

## 2024-05-23 RX ADMIN — LORATADINE 10 MG: 10 TABLET ORAL at 17:19

## 2024-05-23 RX ADMIN — SODIUM CHLORIDE 500 ML: 900 INJECTION, SOLUTION INTRAVENOUS at 17:19

## 2024-05-23 RX ADMIN — MECLIZINE HYDROCHLORIDE 50 MG: 25 TABLET ORAL at 17:19

## 2024-05-23 ASSESSMENT — PAIN SCALES - GENERAL
PAINLEVEL_OUTOF10: 0 - NO PAIN
PAINLEVEL_OUTOF10: 0 - NO PAIN

## 2024-05-23 ASSESSMENT — COLUMBIA-SUICIDE SEVERITY RATING SCALE - C-SSRS
2. HAVE YOU ACTUALLY HAD ANY THOUGHTS OF KILLING YOURSELF?: NO
1. IN THE PAST MONTH, HAVE YOU WISHED YOU WERE DEAD OR WISHED YOU COULD GO TO SLEEP AND NOT WAKE UP?: NO
6. HAVE YOU EVER DONE ANYTHING, STARTED TO DO ANYTHING, OR PREPARED TO DO ANYTHING TO END YOUR LIFE?: NO

## 2024-05-23 ASSESSMENT — PAIN - FUNCTIONAL ASSESSMENT
PAIN_FUNCTIONAL_ASSESSMENT: 0-10
PAIN_FUNCTIONAL_ASSESSMENT: 0-10

## 2024-05-23 ASSESSMENT — LIFESTYLE VARIABLES
EVER FELT BAD OR GUILTY ABOUT YOUR DRINKING: NO
TOTAL SCORE: 0
HAVE YOU EVER FELT YOU SHOULD CUT DOWN ON YOUR DRINKING: NO
EVER HAD A DRINK FIRST THING IN THE MORNING TO STEADY YOUR NERVES TO GET RID OF A HANGOVER: NO
HAVE PEOPLE ANNOYED YOU BY CRITICIZING YOUR DRINKING: NO

## 2024-05-24 LAB — HOLD SPECIMEN: NORMAL

## 2024-05-24 NOTE — ED PROVIDER NOTES
HPI   Chief Complaint   Patient presents with    Dizziness     Presents to ED for dizziness and difficulty ambulating since Saturday.       73-year-old female presented emergency department with a chief complaint of dizziness described as room spinning and feeling off balance for the last several days.  She states she has dealt with a peripheral vertigo for the last 10 years.  She denies chest pain, shortness of breath, numbness, weakness.  She is well-appearing nontoxic.  She has a history of atrial fibrillation.  Follows with electrophysiology.  Denies fall or injury or trauma.  Denies diarrhea or vomiting.  No other complaint.                          [place] Coma Scale Score: 15                     Patient History   No past medical history on file.  No past surgical history on file.  Family History   Problem Relation Name Age of Onset    Cancer Mother      Breast cancer Mother  69    Alzheimer's disease Father       Social History     Tobacco Use    Smoking status: Never     Passive exposure: Never    Smokeless tobacco: Never   Vaping Use    Vaping status: Never Used   Substance Use Topics    Alcohol use: Never    Drug use: Never       Physical Exam   ED Triage Vitals [05/23/24 1627]   Temperature Heart Rate Respirations BP   36.7 °C (98.1 °F) 64 18 141/68      Pulse Ox Temp Source Heart Rate Source Patient Position   94 % Tympanic Monitor --      BP Location FiO2 (%)     -- --       Physical Exam  Vitals and nursing note reviewed.   Constitutional:       Appearance: Normal appearance.   HENT:      Head: Normocephalic.      Ears:      Comments: Slight bulge left tympanic membrane     Nose: Nose normal.      Mouth/Throat:      Mouth: Mucous membranes are moist.   Cardiovascular:      Rate and Rhythm: Normal rate and regular rhythm.   Pulmonary:      Effort: Pulmonary effort is normal.   Abdominal:      General: Abdomen is flat.   Musculoskeletal:         General: Normal range of motion.      Cervical back: Normal  range of motion.   Skin:     General: Skin is warm.   Neurological:      General: No focal deficit present.      Mental Status: She is alert and oriented to person, place, and time.   Psychiatric:         Mood and Affect: Mood normal.         ED Course & MDM   ED Course as of 05/23/24 2137   Thu May 23, 2024   1703 Normal sinus rhythm with a rate of 60.  NM interval is 132.  Right bundle branch block.  Left anterior fascicular block.  No evidence of ischemia.  No previous EKG for comparison [JN]      ED Course User Index  [JN] Travon Foster MD         Diagnoses as of 05/23/24 2137   Vertigo   Urinary tract infection without hematuria, site unspecified       Medical Decision Making  I have seen and evaluated this patient.  The attending physician has also seen and evaluated this patient.  Vital signs, laboratory testing and diagnostic images if applicable have been reviewed.  All laboratory and imaging is interpreted by myself unless otherwise stated.  Radiology studies are also formally interpreted by radiologist.    CBC without significant leukocytosis or anemia, metabolic panel without significant renal impairment or electrolyte abnormality.  Troponin within an appropriate range without significant delta change, chest x-ray without actionable cardiopulmonary process, EKG without evidence of acute ischemia.  CT brain negative.  Patient has improvement in emergency department after treatment with meclizine.  Her urinalysis shows evidence of large infection as well.  Given dose of Rocephin.  Believe this is playing a role in patient's dizziness.  And given ENT referral.  Patient was comfortable with plan of discharge.  Released in good condition.    Labs Reviewed  COMPREHENSIVE METABOLIC PANEL - Abnormal     Glucose                       263 (*)                Sodium                        139                    Potassium                     4.1                    Chloride                      106                     Bicarbonate                   23 (*)                 Urea Nitrogen                 28 (*)                 Creatinine                    1.20                   eGFR                          48 (*)                 Calcium                       9.2                    Albumin                       4.0                    Alkaline Phosphatase          50                     Total Protein                 6.8                    AST                           12                     Bilirubin, Total              0.7                    ALT                           11                     Anion Gap                     10                  CBC WITH AUTO DIFFERENTIAL - Abnormal     WBC                           5.3                    nRBC                          0.0                    RBC                           3.97 (*)               Hemoglobin                    11.8 (*)               Hematocrit                    36.9                   MCV                           93                     MCH                           29.7                   MCHC                          32.0                   RDW                           12.4                   Platelets                     197                    Neutrophils %                 71.2                   Immature Granulocytes %, Automated   0.2                    Lymphocytes %                 20.1                   Monocytes %                   4.9                    Eosinophils %                 3.2                    Basophils %                   0.4                    Neutrophils Absolute          3.76                   Immature Granulocytes Absolute, Au*   0.01                   Lymphocytes Absolute          1.06                   Monocytes Absolute            0.26                   Eosinophils Absolute          0.17                   Basophils Absolute            0.02                URINALYSIS WITH REFLEX CULTURE AND MICROSCOPIC - Abnormal     Color, Urine                   Yellow                 Appearance, Urine             Turbid (*)               Specific Gravity, Urine       1.027                  pH, Urine                     6.0                    Protein, Urine                20 (TRACE)                Glucose, Urine                Normal                 Blood, Urine                  0.06 (1+) (*)               Ketones, Urine                NEGATIVE                Bilirubin, Urine              NEGATIVE                Urobilinogen, Urine           Normal                 Nitrite, Urine                1+ (*)                 Leukocyte Esterase, Urine     250 Shanna/µL (*)            SERIAL TROPONIN, INITIAL ([facility]) - Abnormal     Troponin T, High Sensitivity   27 (*)              TROPONIN T, HIGH SENSITIVITY - Abnormal     Troponin T, High Sensitivity   26 (*)              MICROSCOPIC ONLY, URINE - Abnormal     WBC, Urine                    >50 (*)                RBC, Urine                    1-2                    Squamous Epithelial Cells, Urine                          Bacteria, Urine               3+ (*)                 Mucus, Urine                  FEW                 URINE CULTURE  TROPONIN T SERIES, HIGH SENSITIVITY (0, 2 HR, 6 HR)       Narrative: The following orders were created for panel order Troponin T Series, High Sensitivity (0, 2HR, 6HR).                Procedure                               Abnormality         Status                                   ---------                               -----------         ------                                   Serial Troponin, Initial...[[id number]]  Abnormal            Final result                                             Please view results for these tests on the individual orders.  URINALYSIS WITH REFLEX CULTURE AND MICROSCOPIC       Narrative: The following orders were created for panel order Urinalysis with Reflex Culture and Microscopic.                Procedure                               Abnormality         Status                                    ---------                               -----------         ------                                   Urinalysis with Reflex C...[[id number]]  Abnormal            Final result                             Extra Urine Gray Tube[[id number]]                            In process                                               Please view results for these tests on the individual orders.  EXTRA URINE GRAY TUBE  CT head wo IV contrast   Final Result    No acute intracranial abnormality. Consider follow-up with MRI as    warranted.                Signed by: Junior Lucero 5/23/2024 6:48 PM    Dictation workstation:   [id number]     XR chest 2 views   Final Result    No acute cardiopulmonary process.          MACRO:    None          Signed by: Billie Savage 5/23/2024 6:01 PM    Dictation workstation:   [id number]     Medications  ondansetron (Zofran) injection 4 mg (4 mg intravenous Not Given 5/23/24 1719)  cefTRIAXone (Rocephin) IVPB 1 g (1 g intravenous New Bag 5/23/24 2141)  meclizine (Antivert) tablet 50 mg (50 mg oral Given 5/23/24 1719)  loratadine (Claritin) tablet 10 mg (10 mg oral Given 5/23/24 1719)  sodium chloride 0.9 % bolus 500 mL (0 mL intravenous Stopped 5/23/24 1749)  New Prescriptions  CEPHALEXIN (KEFLEX) 500 MG CAPSULE     Take 1 capsule (500 mg) by mouth 3 times a day for 7 days.  MECLIZINE (ANTIVERT) 25 MG TABLET     Take 1 tablet (25 mg) by mouth 3 times a day as needed for dizziness for up to 20 doses.  ONDANSETRON ODT (ZOFRAN-ODT) 4 MG DISINTEGRATING TABLET     Take 1 tablet (4 mg) by mouth every 8 hours if needed for nausea or vomiting for up to 7 days.            Procedure  Procedures     Travon Rosales PA-C  05/23/24 0354

## 2024-05-26 LAB — BACTERIA UR CULT: ABNORMAL

## 2024-05-29 ENCOUNTER — TELEPHONE (OUTPATIENT)
Dept: OTOLARYNGOLOGY | Facility: CLINIC | Age: 74
End: 2024-05-29
Payer: MEDICARE

## 2024-05-29 ENCOUNTER — TELEPHONE (OUTPATIENT)
Dept: PHARMACY | Facility: HOSPITAL | Age: 74
End: 2024-05-29
Payer: MEDICARE

## 2024-05-29 PROBLEM — N18.32 STAGE 3B CHRONIC KIDNEY DISEASE (MULTI): Status: ACTIVE | Noted: 2024-05-29

## 2024-05-29 PROBLEM — N39.44 NOCTURNAL ENURESIS: Status: ACTIVE | Noted: 2024-05-29

## 2024-05-29 PROBLEM — I12.9 CHRONIC KIDNEY DISEASE DUE TO HYPERTENSION: Status: ACTIVE | Noted: 2024-05-29

## 2024-05-29 PROBLEM — R82.81 PYURIA: Status: ACTIVE | Noted: 2024-05-29

## 2024-05-29 PROBLEM — R60.9 EDEMA: Status: ACTIVE | Noted: 2024-05-29

## 2024-05-29 NOTE — TELEPHONE ENCOUNTER
Pt was seen 04/2024 by Aliza and Dr. Padgett.  Pt is still getting dizzy when she rolls over in bed , getting out of bed or from bending over.  Can you call her and see if this BPPV and how to schedule her?

## 2024-05-29 NOTE — PROGRESS NOTES
EDPD Note: Dose Change    Contacted Dina Mullins regarding positive Escherichia coli urine culture/result that was taken during their recent emergency room visit. I completed education with  the patient . The patient is being treated with the proper medication; however, the dose of the discharge prescription is incorrect. I gave verbal education about the new medication dose found below. Patient discharged with a prescription for cephalexin 500 mg TID x 7 days, which is appropriate since culture shows susceptibility to cefazolin. Patient denied any worsening urinary symptoms, but states that dizziness has not quite resolved. Denied fever, nausea, or vomiting. Patient preferred to continue antibiotic as is especially since it seems to have slightly improved dizziness per patient. Discussed reducing duration to BID for remaining days of therapy versus TID, as is also indicated per the  Urinary tract infection guidelines upon symptom improvement. Patient confirmed they are following up with an ENT specialist in next few weeks regarding diagnosis of vertigo. Patient had no other questions for pharmacy. Patient to return to the ED or contact PCP if symptoms worsen before follow up appointments. No further follow up needed from EDPD Team. Dosing is okay based on patient's CrCL: 51.42 mL/min with adjusted body weight, 80 kg.     Drug: cephalexin 500 mg   Sig: Take 1 capsule PO BID x 7 days  Days Supply: 7 days      Contains abnormal data Urine Culture  Order: [id number] - Reflex for Order [id number]   Collected 5/23/2024 20:48       Status: Final result       Visible to patient: Yes (not seen)    Specimen Information: Clean Catch/Voided; Urine   2 Result Notes       1 Follow-up Encounter  Urine Culture >100,000 Escherichia coli Abnormal            Resulting Agency: [facility]     Susceptibility     Escherichia coli     MICROSCAN    Ampicillin Susceptible    Cefazolin Susceptible    Cefazolin (uncomplicated UTIs only)  Susceptible    Ciprofloxacin Susceptible    Gentamicin Susceptible    Nitrofurantoin Susceptible    Piperacillin/Tazobactam Susceptible    Trimethoprim/Sulfamethoxazole Susceptible              Linear View         Specimen Collected: 05/23/24 20:48 Last Resulted: 05/26/24 14:20           Marybel N Estella, PharmD

## 2024-06-03 ENCOUNTER — HOSPITAL ENCOUNTER (OUTPATIENT)
Dept: RADIOLOGY | Facility: CLINIC | Age: 74
Discharge: HOME | End: 2024-06-03
Payer: MEDICARE

## 2024-06-03 ENCOUNTER — OFFICE VISIT (OUTPATIENT)
Dept: ORTHOPEDIC SURGERY | Facility: CLINIC | Age: 74
End: 2024-06-03
Payer: MEDICARE

## 2024-06-03 VITALS — BODY MASS INDEX: 47.64 KG/M2 | WEIGHT: 268.96 LBS

## 2024-06-03 DIAGNOSIS — M25.512 LEFT SHOULDER PAIN, UNSPECIFIED CHRONICITY: Primary | ICD-10-CM

## 2024-06-03 DIAGNOSIS — M75.52 BURSITIS OF LEFT SHOULDER: ICD-10-CM

## 2024-06-03 DIAGNOSIS — M19.019 SHOULDER ARTHRITIS: ICD-10-CM

## 2024-06-03 DIAGNOSIS — R52 PAIN: ICD-10-CM

## 2024-06-03 PROCEDURE — 4010F ACE/ARB THERAPY RXD/TAKEN: CPT | Performed by: ORTHOPAEDIC SURGERY

## 2024-06-03 PROCEDURE — 20610 DRAIN/INJ JOINT/BURSA W/O US: CPT | Performed by: ORTHOPAEDIC SURGERY

## 2024-06-03 PROCEDURE — 1036F TOBACCO NON-USER: CPT | Performed by: ORTHOPAEDIC SURGERY

## 2024-06-03 PROCEDURE — 3051F HG A1C>EQUAL 7.0%<8.0%: CPT | Performed by: ORTHOPAEDIC SURGERY

## 2024-06-03 PROCEDURE — 99213 OFFICE O/P EST LOW 20 MIN: CPT | Mod: 25 | Performed by: ORTHOPAEDIC SURGERY

## 2024-06-03 PROCEDURE — 2500000005 HC RX 250 GENERAL PHARMACY W/O HCPCS: Performed by: ORTHOPAEDIC SURGERY

## 2024-06-03 PROCEDURE — 73080 X-RAY EXAM OF ELBOW: CPT | Mod: LT

## 2024-06-03 PROCEDURE — 1125F AMNT PAIN NOTED PAIN PRSNT: CPT | Performed by: ORTHOPAEDIC SURGERY

## 2024-06-03 PROCEDURE — 2500000004 HC RX 250 GENERAL PHARMACY W/ HCPCS (ALT 636 FOR OP/ED): Performed by: ORTHOPAEDIC SURGERY

## 2024-06-03 PROCEDURE — 1160F RVW MEDS BY RX/DR IN RCRD: CPT | Performed by: ORTHOPAEDIC SURGERY

## 2024-06-03 PROCEDURE — 99203 OFFICE O/P NEW LOW 30 MIN: CPT | Performed by: ORTHOPAEDIC SURGERY

## 2024-06-03 PROCEDURE — 1159F MED LIST DOCD IN RCRD: CPT | Performed by: ORTHOPAEDIC SURGERY

## 2024-06-03 PROCEDURE — 73080 X-RAY EXAM OF ELBOW: CPT | Mod: LEFT SIDE | Performed by: ORTHOPAEDIC SURGERY

## 2024-06-03 PROCEDURE — 1157F ADVNC CARE PLAN IN RCRD: CPT | Performed by: ORTHOPAEDIC SURGERY

## 2024-06-03 RX ORDER — LIDOCAINE HYDROCHLORIDE 10 MG/ML
1 INJECTION INFILTRATION; PERINEURAL
Status: COMPLETED | OUTPATIENT
Start: 2024-06-03 | End: 2024-06-03

## 2024-06-03 RX ORDER — TRIAMCINOLONE ACETONIDE 40 MG/ML
10 INJECTION, SUSPENSION INTRA-ARTICULAR; INTRAMUSCULAR
Status: COMPLETED | OUTPATIENT
Start: 2024-06-03 | End: 2024-06-03

## 2024-06-03 RX ADMIN — LIDOCAINE HYDROCHLORIDE 1 ML: 10 INJECTION, SOLUTION INFILTRATION; PERINEURAL at 14:05

## 2024-06-03 RX ADMIN — TRIAMCINOLONE ACETONIDE 10 MG: 400 INJECTION, SUSPENSION INTRA-ARTICULAR; INTRAMUSCULAR at 14:05

## 2024-06-03 ASSESSMENT — LIFESTYLE VARIABLES: TOTAL SCORE: 0

## 2024-06-03 ASSESSMENT — PATIENT HEALTH QUESTIONNAIRE - PHQ9
SUM OF ALL RESPONSES TO PHQ9 QUESTIONS 1 AND 2: 0
1. LITTLE INTEREST OR PLEASURE IN DOING THINGS: NOT AT ALL
2. FEELING DOWN, DEPRESSED OR HOPELESS: NOT AT ALL

## 2024-06-03 ASSESSMENT — ENCOUNTER SYMPTOMS
OCCASIONAL FEELINGS OF UNSTEADINESS: 1
DEPRESSION: 0
LOSS OF SENSATION IN FEET: 0

## 2024-06-03 ASSESSMENT — PAIN - FUNCTIONAL ASSESSMENT: PAIN_FUNCTIONAL_ASSESSMENT: 0-10

## 2024-06-03 ASSESSMENT — PAIN SCALES - GENERAL: PAINLEVEL_OUTOF10: 9

## 2024-06-03 ASSESSMENT — PAIN DESCRIPTION - DESCRIPTORS: DESCRIPTORS: ACHING;SHARP

## 2024-06-03 NOTE — PROGRESS NOTES
Subjective      Chief Complaint   Patient presents with    Left Forearm - Pain        History reviewed. No pertinent surgical history.     HPI  This 73 year old patient presents today with left arm and shoulder pain 8. The patient states that the left shoulder pain has been present for months. The patient denies trauma or injury. The patient states that the left shoulder pain is worse with and aggravated by reaching and lifting. The patient states that this shoulder pain is disabling and presents today to discuss further options. The patient states that they have tried tylenol and ibuprofen with no relief.    CARDIOLOGY:   Negative for chest pain, shortness of breath.   RESPIRATORY:   Negative for chest pain, shortness of breath.   MUSCULOSKELETAL:   See HPI for details.   NEUROLOGY:   Negative for tingling, numbness, weakness.    Objective      There were no vitals taken for this visit.     SHOULDER EXAM  Constitutional: Appears stated age. No apparent distress  Labored Breathing: No  Psychiatric: Normal mood and effect.   Neurological: alert and oriented x3  Skin: intact  HEENT: No bruising, otorrhea, rhinorrhea.  MUSCULOSKELETAL: Neck: No tenderness. No pain or limitation with range of motion. Back: No tenderness. Straight leg test negative bilaterally. left upper extremity and shoulder: There is tenderness anteriorly and laterally. Active abduction and active flexion are 0-110 degrees but with pain and guarding. There is pain with and limitation of active and passive internal and external rotation.  Right shoulder: Nontender.  No pain with range of motion. Left elbow: Full active and passive painless range of motion.Comparments are soft. Neurovascular is intact.     AP and lateral x-rays of the left shoulder done on 5- shows   IMPRESSION:  Moderate acromioclavicular and mild glenohumeral osteoarthrosis.    I reviewed the x-rays listed above with the patient in the office today.  Patient ID: Dina AGUILAR  Harpreet is a 73 y.o. female.    L Inj/Asp: L subacromial bursa on 6/3/2024 2:05 PM  Indications: pain  Details: 22 G needle, lateral approach  Medications: 1 mL lidocaine 10 mg/mL (1 %); 10 mg triamcinolone acetonide 40 mg/mL  Outcome: tolerated well, no immediate complications  Procedure, treatment alternatives, risks and benefits explained, specific risks discussed. Immediately prior to procedure a time out was called to verify the correct patient, procedure, equipment, support staff and site/side marked as required. Patient was prepped and draped in the usual sterile fashion.         Dina was seen today for pain.  Diagnoses and all orders for this visit:  Left shoulder pain, unspecified chronicity (Primary)  Shoulder arthritis  -     Referral to Orthopaedic Surgery       Options are discussed with the patient in detail. The patient is instructed regarding activity modification, ice, physician directed at home gentle strengthening and ROM exercises, and the appropriate use of Tylenol as needed for pain with its potential adverse reactions and side effects. The patient understands. The patient states that despite all the treatment listed above that this left shoulder pain is debilitating and  requests a discussion of further options. Cortisone injection to the left shoulder is discussed in the office today. This is done in the office today. See procedures below. Return as needed, Please note that this report has been produced using speech recognition software.  It may contain errors related to grammar, punctuation or spelling.  Electronically signed, but not reviewed.

## 2024-06-03 NOTE — PATIENT INSTRUCTIONS
Thank you for coming to see us today!     We are going to give you a referral for physical therapy    Please follow up with us as needed

## 2024-06-18 ENCOUNTER — APPOINTMENT (OUTPATIENT)
Dept: AUDIOLOGY | Facility: CLINIC | Age: 74
End: 2024-06-18
Payer: MEDICARE

## 2024-06-18 DIAGNOSIS — R42 DIZZINESS: Primary | ICD-10-CM

## 2024-06-18 PROCEDURE — 92542 POSITIONAL NYSTAGMUS TEST: CPT | Performed by: AUDIOLOGIST

## 2024-06-18 NOTE — LETTER
"     EVALUATION OF BENIGN POSITIONAL PAROXYSMAL VERTIGO BPPV    HISTORY:  Patient reports on May 23, 2024 she had an increase in unsteadiness; she reports she feels as if her brain as to \"catch up\" with her movement.   She went to the emergency department 5/23/24; Ct scan negative; released with meclizine.  Patient had recent audiogram 4/17/24 demonstrating a unilateral sensorineural hearing loss 2KHz and above for the right ear which has significantly decreased since 2014 right ear.  Patient has history of Videonystagmography  performed August 2014, demonstrating a unilateral weakness right ear.     EVALUATION: Hallpike head right negative.   Hallpike head left demonstrated non classic dizziness; proceeded with canalith repositioning    Waited about ten minutes; repeat Hallpike head left demonstrated improvement.      Discussed the pathophysiology of BPPV.  Today's findings were not classic BPPV.      RECOMMENDATIONS:   Keep  follow-up with Yassine Awan MD; consider further evaluation of unilateral hearing loss right ear; decrease in hearing since 2014 in right ear.      Raysa Bishop M.A., CCC/A     "

## 2024-06-25 NOTE — PROGRESS NOTES
Dina Mullins female   1950 73 y.o.   [id number]      Chief Complaint  Biopsy consultation    History Of Present Illness  Dina Mullins is a pleasant 73 y.o.  female referred [facility] the [facility] by Ken Saleh for biopsy consultation. She is here with her , Armando. She has family history of breast cancer in her mother, age 66. She denies breast surgery or biopsy. She is currently on Xarelto daily for A-fib.     BREAST IMAGIN2024 Bilateral screening mammogram, indicates BI-RADS Category 0. Right breast mass and prominent axillary lymph node warranting additional views. 2024 Right diagnostic mammogram and ultrasound, indicates BI-RADS Category 4. Right breast, 12:00, 9 cm from the nipple, 0.8 x 0.8 x 1.1 cm solid and hypoechoic mass with irregular margins and associated posterior acoustic shadowing. Hard on elastography. Right axilla scanned, level one lymph node with cortical thickening measuring up to 4mm. Ultrasound guided biopsy recommended of mass and lymph node.     REPRODUCTIVE HISTORY: menarche age 11, , first birth age 20, did not breastfeed, OCP's x 20+ years, natural menopause age 57, no HRT, scattered fibroglandular tissue    FAMILY CANCER HISTORY:   Mother: Breast cancer, age 66, Uterine cancer  Brother: Lung cancer, age 58, smoker  Sister: Liver cancer/Lung cancer, age 60, smoker     Surgical History  She has no past surgical history on file.     Social History  She reports that she has never smoked. She has never been exposed to tobacco smoke. She has never used smokeless tobacco. She reports that she does not drink alcohol and does not use drugs.    Family History  Family History   Problem Relation Name Age of Onset    Cancer Mother      Breast cancer Mother  69    Alzheimer's disease Father          Allergies  Codeine    Medications  Current Outpatient Medications   Medication Instructions    acetaminophen (TYLENOL) 650 mg, oral, Every 4 hours PRN     "atorvastatin (LIPITOR) 40 mg, oral, Nightly    carbidopa-levodopa (Sinemet)  mg tablet 1 tablet, oral, 3 times daily    diphenhydrAMINE-acetaminophen (Tylenol PM)  mg per tablet 1 tablet, oral, Nightly PRN    DULoxetine (CYMBALTA) 20 mg, oral, Daily    fluticasone (Flonase) 50 mcg/actuation nasal spray 1 spray, Each Nostril, Daily PRN    furosemide (LASIX) 40 mg, oral, Daily    Lantus Solostar U-100 Insulin 25 Units, subcutaneous, Every morning    lisinopril 10 mg, oral, Daily    meclizine (ANTIVERT) 25 mg, oral, 3 times daily PRN    meclizine (ANTIVERT) 25 mg, oral, 3 times daily PRN    methocarbamol (ROBAXIN) 750 mg, oral, 3 times daily    OneTouch Verio test strips strip 1 strip, in vitro, 4 times daily, And as needed    pen needle, diabetic 32 gauge x 5/32\" needle USE AS DIRECTED    rivaroxaban (XARELTO) 20 mg, oral, Daily with evening meal         REVIEW OF SYSTEMS    Constitutional:  Negative for appetite change, fatigue, fever and unexpected weight change.   HENT:  Negative for ear pain, hearing loss, nosebleeds, sore throat and trouble swallowing.    Eyes:  Negative for discharge, itching and visual disturbance.   Respiratory:  Negative for cough, chest tightness and shortness of breath.    Cardiovascular:  Negative for chest pain, palpitations and leg swelling.   Breast: as indicated in HPI  Gastrointestinal:  Negative for abdominal pain, constipation, diarrhea and nausea.   Endocrine: Negative for cold intolerance and heat intolerance.   Genitourinary:  Negative for dysuria, frequency, hematuria, pelvic pain and vaginal bleeding.   Musculoskeletal:  Negative for arthralgias, back pain, gait problem, joint swelling and myalgias.   Skin:  Negative for color change and rash.   Allergic/Immunologic: Negative for environmental allergies and food allergies.   Neurological:  Negative for dizziness, tremors, speech difficulty, weakness, numbness and headaches.   Hematological:  Does not bruise/bleed " easily.   Psychiatric/Behavioral:  Negative for agitation, dysphoric mood and sleep disturbance. The patient is not nervous/anxious.         Past Medical History  She has a past medical history of Anemia, Arthritis, Atrial fib/flutter, transient (Multi), Bilateral tinnitus, Chronic kidney failure, DM (diabetes mellitus) (Multi), Hypertension, Left arm pain, Sepsis (Multi), Septic shock (Multi), Stomach ulcer, and Vertigo.     Physical Exam  Patient is alert and oriented x3 and in a relaxed and appropriate mood. Her gait is steady and hand grasps are equal. Sclera is clear. The breasts are nearly symmetrical large and pendulous. The tissue is soft without palpable abnormalities, discrete nodules or masses. The right breast skin and both nipples appear normal. The left inframammary fold with superficial red raised rash, possibly yeast. There is no cervical, supraclavicular or axillary lymphadenopathy. Heart rate and rhythm normal, S1 and S2 appreciated. The lungs are clear to auscultation bilaterally. Abdomen is soft and non-tender.       Physical Exam     Last Recorded Vitals  Vitals:    07/01/24 0934   BP: 141/63   Pulse: 60       Relevant Results   Time was spent viewing digital images of the radiology testing with the patient. I explained the results in depth, along with suggested explanation for follow up recommendations based on the testing results. BI-RADS Category 4    Imaging  Narrative & Impression   Interpreted By:  Sara Hobbs,   STUDY:  BI MAMMO RIGHT DIAGNOSTIC TOMOSYNTHESIS; BI US BREAST LIMITED RIGHT;  5/9/2024 2:28 pm; 5/9/2024 3:03 pm      ACCESSION NUMBER(S):  [id number]; [id number]      ORDERING CLINICIAN:  ARIS SULLIVAN      INDICATION:  Irregular right breast mass on screening mammogram      COMPARISON:  04/17/2024 and 12/20/2021      FINDINGS:  MAMMOGRAPHY: 2D and tomosynthesis images were reviewed at 1 mm slice  thickness.      A 1 cm spiculated mass is present in the middle depth of the  right  breast at the 12 o'clock location 9 cm from the nipple.      ULTRASOUND:  Grayscale sonography with color flow imaging and  elastography demonstrates a solid and quite hypoechoic mass with  irregular margins at the 12 o'clock location 9-1/2 cm from the  nipple. This mass measures 0.8 x 0.8 x 1.1 cm in size. There is  associated posterior acoustic shadowing. Elastography shows that this  lesion is hard.      Within the right axilla, there is a level 1 node measuring 0.8 x 1.1  x 1.8 cm in size exhibiting eccentric cortical thickening measuring  up to 4 mm.      IMPRESSION:  The spiculated mass seen in the middle depth of right breast at the  12 o'clock location is suspicious in appearance on mammography and  sonography. Ultrasound-guided biopsy is advised.      There is also a suspicious appearing level 1 right axillary lymph  node seen as well.      Results were discussed with the patient following conclusion of the  studies.      BI-RADS CATEGORY:      BI-RADS Category:  4 Suspicious.  Recommendation:  Surgical Consultation and Biopsy.  Recommended Date:  Immediate.  Laterality:  Right       BI mammo bilateral screening tomosynthesis 04/17/2024    Narrative  Interpreted By:  Romeo Noriega and Afshari Mirak Sohrab  STUDY:  BI MAMMO BILATERAL SCREENING TOMOSYNTHESIS;  4/17/2024 11:33 am    ACCESSION NUMBER(S):  [id number]    ORDERING CLINICIAN:  ARIS SULLIVAN    INDICATION:  Screening. History of breast cancer in patient's mother diagnosed at  age 69.    COMPARISON:  12/20/2021, 02/10/2020, and 11/05/2018.    FINDINGS:  2D and tomosynthesis images were reviewed at 1 mm slice thickness.  Per technologist, best possible images were obtained.    Density:  There are areas of scattered fibroglandular tissue.    There is an irregular mass with spiculated margins in central right  breast at middle depth. A prominent rounded right axillary lymph node  is also noted on the right MLO view.    No suspicious masses  or calcifications are identified in the left  breast.    Impression  1. Indeterminate right breast mass and prominent right axillary lymph  node. Additional evaluation is recommended with diagnostic mammogram  and ultrasound.  2. No mammographic evidence of malignancy in the left breast.    BI-RADS CATEGORY:    BI-RADS Category:  0 Incomplete; Need Additional Imaging Evaluation  and/or Prior Mammograms for Comparison. Recommendation:  Additional  Imaging. Recommended Date:  Immediate.  Laterality:  Right.    For any future breast imaging appointments, please call [phone]).        Based on the Tyrer-Cuzick model for breast cancer risk assessment,  the patient's lifetime risk of breast cancer is 10.6%. Patients with  over a 20% lifetime risk of developing breast cancer may benefit from  additional screening with breast MRI or ultrasound. Please note that  this estimate is based on responses provided on the patient  questionnaire. For more information regarding high risk consultation,  please call [phone].    I personally reviewed the images/study and I agree with the findings  as stated by resident physician Dr. Juan Verduzco . This study  was interpreted at [place], [place].    MACRO:  None    Signed by: Romeo Noriega 4/22/2024 4:45 PM  Dictation workstation:   [id number]       Assessment/Plan   Abnormal mammogram, right breast mass, right axillary lymphadenopathy, family history of breast cancer, scattered fibroglandular tissue, superficial rash of left breast    Plan: Right breast and right axillary ultrasound guided biopsy. Continue to apply topical medication a prescribed for breast rash.     Patient Discussion/Summary  I recommend a right breast and right axillary ultrasound guided biopsy. A breast radiology physician will perform the procedure. Possible diagnoses include benign, atypia or cancer. Bruising and mild discomfort after the  biopsy is normal and will improve. I typically have results in 3-5 business days. I will call you with the results, please have your phone handy to take my call. If you receive medical information from [facility] Personal Health Record, it is possible to view or see results of your biopsy or procedure before I contact you directly. I will provide recommendations for future follow up based on your biopsy results.     You can see your health information, review clinical summaries from office visits & test results online when you follow your health with MY  Chart, a personal health record. To sign up go to [email or web address]. If you need assistance with signing up or trouble getting into your account call [facility] Patient Line 24/7 at [phone].    Should you have any questions or concerns after biopsy, please do not hesitate to call my office at [phone]. If it has been more than a week since your biopsy was performed and you have not received results, please call my office at [phone]. Thank you for choosing [facility] and trusting me as your healthcare provider. I am honored to be a provider on your health care team and I remain dedicated to helping you achieve your health goals.      Nan Arguelles, APRN-CNP

## 2024-06-26 ENCOUNTER — APPOINTMENT (OUTPATIENT)
Dept: ENDOCRINOLOGY | Facility: CLINIC | Age: 74
End: 2024-06-26
Payer: MEDICARE

## 2024-07-01 ENCOUNTER — HOSPITAL ENCOUNTER (OUTPATIENT)
Dept: RADIOLOGY | Facility: CLINIC | Age: 74
Discharge: HOME | End: 2024-07-01
Payer: MEDICARE

## 2024-07-01 ENCOUNTER — PROCEDURE VISIT (OUTPATIENT)
Dept: SURGICAL ONCOLOGY | Facility: CLINIC | Age: 74
End: 2024-07-01
Payer: MEDICARE

## 2024-07-01 VITALS
WEIGHT: 270.5 LBS | SYSTOLIC BLOOD PRESSURE: 141 MMHG | HEART RATE: 60 BPM | DIASTOLIC BLOOD PRESSURE: 63 MMHG | BODY MASS INDEX: 47.92 KG/M2

## 2024-07-01 DIAGNOSIS — R59.0 AXILLARY LYMPHADENOPATHY: ICD-10-CM

## 2024-07-01 DIAGNOSIS — N63.10 MASS OF RIGHT BREAST, UNSPECIFIED QUADRANT: ICD-10-CM

## 2024-07-01 DIAGNOSIS — R92.8 OTHER ABNORMAL AND INCONCLUSIVE FINDINGS ON DIAGNOSTIC IMAGING OF BREAST: ICD-10-CM

## 2024-07-01 DIAGNOSIS — R92.8 ABNORMAL MAMMOGRAM: Primary | ICD-10-CM

## 2024-07-01 PROCEDURE — A4648 IMPLANTABLE TISSUE MARKER: HCPCS

## 2024-07-01 PROCEDURE — 2780000003 HC OR 278 NO HCPCS

## 2024-07-01 PROCEDURE — 76642 ULTRASOUND BREAST LIMITED: CPT | Mod: RT

## 2024-07-01 PROCEDURE — 2500000005 HC RX 250 GENERAL PHARMACY W/O HCPCS: Performed by: STUDENT IN AN ORGANIZED HEALTH CARE EDUCATION/TRAINING PROGRAM

## 2024-07-01 PROCEDURE — 76642 ULTRASOUND BREAST LIMITED: CPT | Mod: RIGHT SIDE | Performed by: STUDENT IN AN ORGANIZED HEALTH CARE EDUCATION/TRAINING PROGRAM

## 2024-07-01 PROCEDURE — C1819 TISSUE LOCALIZATION-EXCISION: HCPCS

## 2024-07-01 PROCEDURE — 76942 ECHO GUIDE FOR BIOPSY: CPT

## 2024-07-01 PROCEDURE — 77065 DX MAMMO INCL CAD UNI: CPT

## 2024-07-01 PROCEDURE — 19083 BX BREAST 1ST LESION US IMAG: CPT | Mod: RT

## 2024-07-01 PROCEDURE — 99214 OFFICE O/P EST MOD 30 MIN: CPT | Performed by: NURSE PRACTITIONER

## 2024-07-01 PROCEDURE — 2720000007 HC OR 272 NO HCPCS

## 2024-07-01 PROCEDURE — 99204 OFFICE O/P NEW MOD 45 MIN: CPT | Performed by: NURSE PRACTITIONER

## 2024-07-01 PROCEDURE — 76982 USE 1ST TARGET LESION: CPT | Mod: 50

## 2024-07-01 ASSESSMENT — PAIN SCALES - GENERAL
PAINLEVEL_OUTOF10: 3
PAINLEVEL: 0-NO PAIN
PAINLEVEL_OUTOF10: 3
PAINLEVEL_OUTOF10: 0 - NO PAIN

## 2024-07-01 NOTE — PATIENT INSTRUCTIONS
I recommend a right breast and right axillary ultrasound guided biopsy. A breast radiology physician will perform the procedure. Possible diagnoses include benign, atypia or cancer. Bruising and mild discomfort after the biopsy is normal and will improve. I typically have results in 3-5 business days. I will call you with the results, please have your phone handy to take my call. If you receive medical information from [facility] Personal Health Record, it is possible to view or see results of your biopsy or procedure before I contact you directly. I will provide recommendations for future follow up based on your biopsy results.     You can see your health information, review clinical summaries from office visits & test results online when you follow your health with MY  Chart, a personal health record. To sign up go to [email or web address]. If you need assistance with signing up or trouble getting into your account call [facility] Patient Line 24/7 at [phone].    Should you have any questions or concerns after biopsy, please do not hesitate to call my office at [phone]. If it has been more than a week since your biopsy was performed and you have not received results, please call my office at [phone]. Thank you for choosing [facility] and trusting me as your healthcare provider. I am honored to be a provider on your health care team and I remain dedicated to helping you achieve your health goals.

## 2024-07-01 NOTE — DISCHARGE INSTRUCTIONS
AFTER THE TEST  A steri-strip and bandage will be placed over the incision. You may shower after 24 hours. Remove bandage after 24 hours. Remove bandage after the shower. Leave the steri-strips in place to fall off on their own. If after 1 week the steri-strips are still on, you may remove them. Avoid swimming or soaking in tub for 3 days.     You may have mild discomfort at the test site. If needed, you may take Tylenol (Acetaminophen) for pain. Please avoid taking NSAIDs, Motrin, Advil, Aleve, or ibuprofen for 24 hours following the biopsy. After 24 hours you may resume NSAIDSs.     If you take aspirin, Plavix, Coumadin, Xarelto or Eliquis please tell us. If these medications were stopped by your provider, please ask them when to resume.     You may have some tenderness, bruising or slight bleeding at the site. Please apply ice packs to the site for 15 minutes on and 15 minutes off for a 2 hour minimum.     Most people can return to their usual routine after the procedure. Avoid Strenuous activity for 24 hours.     Sleep in a bra the night after your biopsy. Continue to do so for comfort.     Call your provider if you have any of the following symptoms :  Fever  Increased pain  Increased bleeding  Redness  Increased swelling  Yellowish drainage  Your provider will get the biopsy results within 5 - 7 days. Call your provider with any questions.     Patient education brochure and pain/comfort measures have been reviewed.   Phone number provided to contact Breast Center if problems arise.     Patient verbalized understanding of home going instructions.   Patient left breast [place] by wheelchair accompanied by her .

## 2024-07-01 NOTE — Clinical Note
Pressure held x 10 minutes, incision dry, steri strips intact and compression dressing applied and ice pack applied to all 3 sites.

## 2024-07-02 ENCOUNTER — APPOINTMENT (OUTPATIENT)
Dept: ENDOCRINOLOGY | Facility: CLINIC | Age: 74
End: 2024-07-02
Payer: MEDICARE

## 2024-07-03 DIAGNOSIS — R42 DIZZINESS: ICD-10-CM

## 2024-07-03 RX ORDER — MECLIZINE HYDROCHLORIDE 25 MG/1
25 TABLET ORAL 3 TIMES DAILY PRN
Qty: 90 TABLET | Refills: 2 | Status: SHIPPED | OUTPATIENT
Start: 2024-07-03

## 2024-07-05 LAB
LAB AP ASR DISCLAIMER: NORMAL
LABORATORY COMMENT REPORT: NORMAL
PATH REPORT.ADDENDUM SPEC: NORMAL
PATH REPORT.FINAL DX SPEC: NORMAL
PATH REPORT.GROSS SPEC: NORMAL
PATH REPORT.RELEVANT HX SPEC: NORMAL
PATH REPORT.TOTAL CANCER: NORMAL

## 2024-07-08 ENCOUNTER — TELEPHONE (OUTPATIENT)
Dept: SURGERY | Facility: HOSPITAL | Age: 74
End: 2024-07-08
Payer: MEDICARE

## 2024-07-08 NOTE — TELEPHONE ENCOUNTER
Spoke with Dina regarding right breast and right axillary biopsies. Surgical excision recommended. Referred to Breast Surgeon.

## 2024-07-09 NOTE — PROGRESS NOTES
" BREAST SURGICAL ONCOLOGY FOLLOW UP     Subjective   Dina Mullins is a 73 y.o. female presents today for follow up with newly diagnosed carcinoma of the right breast. \"Annette\"  She is referred by Ken Torres PA-C and Samia Arguelles CNP    She had a ultrasound core biopsy of a 2 right breast masses and a right axillary lymph node on 2024.     Pathology showed:   Right breast mass-0.8 cm, 11:00, 8 cm from the nipple-invasive ductal carcinoma, grade 3; triple negative  Right breast mass-1.1 cm, 12:00, 9 cm from the nipple-invasive ductal carcinoma, grade 1; ER >95%, [place] 85%, HER2 equivocal and negative on dual URI   Right axillary lymph node negative and concordant  The 2 biopsied breast masses are 6 cm apart.      She is here today with her  Leo to discuss these results and develop a treatment plan.    BREAST IMAGIN2024 Bilateral screening mammogram, indicates BI-RADS Category 0. Right breast mass and prominent axillary lymph node warranting additional views. 2024 Right diagnostic mammogram and ultrasound, indicates BI-RADS Category 4. Right breast, 12:00, 9 cm from the nipple, 0.8 x 0.8 x 1.1 cm solid and hypoechoic mass with irregular margins and associated posterior acoustic shadowing. Hard on elastography. Right axilla scanned, level one lymph node with cortical thickening measuring up to 4mm. Ultrasound guided biopsy recommended of mass and lymph node.      REPRODUCTIVE HISTORY: menarche age 11, , first birth age 20, did not breastfeed, OCP's x 20+ years, natural menopause age 57, no HRT, scattered fibroglandular tissue     FAMILY CANCER HISTORY:   Mother: Breast cancer, age 66, Uterine cancer  Brother: Lung cancer, age 58, smoker  Sister: Liver cancer/Lung cancer, age 60, smoker    PMH: Hypertension, diabetes mellitus, coronary artery disease, atrial fibrillation, Parkinson's disease, vertigo, Kayla's gangrene  Patient is on Xarelto.    PHYSICAL EXAM:    General: Alert " and oriented x 3.  Mood and affect are appropriate.  Lungs: Clear to auscultation  Heart: Regular rate and rhythm  Abdomen: soft, nontender  Breast: Lymph node exam shows no cervical, supraclavicular, or axillary lymphadenopathy.  Breast exam shows symmetric breasts bilaterally with no skin changes, no dominant masses and no nipple discharge in either breast.          Assessment/Plan     Clinical stage 1A multicentric right breast cancer diagnosed in June 2024    Right breast mass-cT1N0; 0.8 cm, 11:00, 8 cm from the nipple-invasive ductal carcinoma, grade 3; triple negative  Right breast mass-cT1N0; 1.1 cm, 12:00, 9 cm from the nipple-invasive ductal carcinoma, grade 1; ER >95%, CO 85%, HER2 equivocal and negative on dual URI   Right axillary lymph node negative and concordant  The 2 biopsied breast masses are 6 cm apart.    There are 2 separate breast cancers in the right breast and both are a different type of cancer.    We discussed surgical options for breast cancer, which would include a lumpectomy (remove just the cancer in the breast with a small amount of normal tissue around it called a margin), followed by radiation therapy (xray treatments to the breast for 4-6 weeks) or mastectomy (remove the entire breast) with or without reconstruction by a plastic surgeon. The survival rate after a lumpectomy with radiation or a mastectomy are the same.  The chance of a recurrence (the cancer coming back) is slightly higher with a lumpectomy (4-6%) than with a mastectomy (2%).    We have discussed the typical risks of radiation treatment. More common risks are 'sunburn' like changes in the breast, fatigue (feeling tired) and change in size of the breast. Much less common risks are skin cancers, rib fracture, damage to the heart (if the cancer is on the left side) and long-term skin changes. If you have radiation therapy, you will meet with a radiation oncologist who will discuss this more. Women over 70 years old who  have small, non-aggressive (estrogen and progesterone positive, Her2 negative) breast cancers may be able to omit radiation treatments.  We have discussed options for reconstruction briefly. If you choose to have reconstruction, you will meet with a plastic surgeon who will discuss this more.  If you are having a lumpectomy and the cancer is not able to be felt in your breast, a small magnetic marking clip called a Magseed will be placed in the breast to help locate the breast cancer tissue that will be removed at surgery. This is a minor procedure which will be done by a radiologist sometime before surgery. It is similar to having the marker placed when you had the biopsy.  We have discussed that the need for chemotherapy may not be determined until after you have surgery. Having a lumpectomy or a mastectomy will not change the decision of whether or nor chemotherapy is needed.   We also discussed proceeding with a sentinel lymph node biopsy. That means to remove a few lymph nodes under the arm to test for cancer.   The risks, benefits, and procedures of all of these were discussed, including bleeding, infection, need for additional surgery to get clear margins, scar tissue formation, deformity of the area, decreased function, mobility or strength of the arm, arm swelling, increased risk of infection in the arm, numbness, pain or burning under the arm and the risk of general anesthesia. We discussed typical recovery after surgery and the typical time until you can resume all activities. She understood this and all of her questions were answered.    I have discussed with the patient the pathophysiology of the disease process and the rationale for the planned surgery. I have explained the anticipated risks and possible complications, the incidences and consequences of those risks and complications, and the expected postoperative course. Alternatives have been discussed including the alternative of no surgery. The  patient has been given the opportunity to ask questions and all her questions have been answered to her satisfaction.    Surgery has been recommended. The risks, benefits and procedure have been reviewed with you today.   You will be scheduled for pre-surgical testing and detailed instructions will be given to you at that appointment. Xarelto will need to be stopped 2 days before surgery.  The pathology results from your surgery should be available about 14 days after the procedure. I will call you with the results. If you do not hear from me within 21 days, please call the office at [phone] for your results.    Schedule right lumpectomy with Magseed localization x 2, right axillary sentinel lymph node biopsy and excision of right axillary lymph node with Magseed localization    Mabel Crenshaw MD

## 2024-07-16 ENCOUNTER — OFFICE VISIT (OUTPATIENT)
Dept: SURGICAL ONCOLOGY | Facility: CLINIC | Age: 74
End: 2024-07-16
Payer: MEDICARE

## 2024-07-16 VITALS
HEIGHT: 62 IN | WEIGHT: 259.3 LBS | RESPIRATION RATE: 20 BRPM | HEART RATE: 63 BPM | TEMPERATURE: 97.9 F | BODY MASS INDEX: 47.72 KG/M2

## 2024-07-16 DIAGNOSIS — C50.811 MALIGNANT NEOPLASM OF OVERLAPPING SITES OF RIGHT FEMALE BREAST, UNSPECIFIED ESTROGEN RECEPTOR STATUS (MULTI): Primary | ICD-10-CM

## 2024-07-16 PROCEDURE — 1036F TOBACCO NON-USER: CPT | Performed by: SURGERY

## 2024-07-16 PROCEDURE — 99215 OFFICE O/P EST HI 40 MIN: CPT | Performed by: SURGERY

## 2024-07-16 PROCEDURE — 1159F MED LIST DOCD IN RCRD: CPT | Performed by: SURGERY

## 2024-07-16 PROCEDURE — 1157F ADVNC CARE PLAN IN RCRD: CPT | Performed by: SURGERY

## 2024-07-16 PROCEDURE — 1126F AMNT PAIN NOTED NONE PRSNT: CPT | Performed by: SURGERY

## 2024-07-16 PROCEDURE — 4010F ACE/ARB THERAPY RXD/TAKEN: CPT | Performed by: SURGERY

## 2024-07-16 PROCEDURE — 3051F HG A1C>EQUAL 7.0%<8.0%: CPT | Performed by: SURGERY

## 2024-07-16 PROCEDURE — 1160F RVW MEDS BY RX/DR IN RCRD: CPT | Performed by: SURGERY

## 2024-07-16 RX ORDER — SODIUM CHLORIDE, SODIUM LACTATE, POTASSIUM CHLORIDE, CALCIUM CHLORIDE 600; 310; 30; 20 MG/100ML; MG/100ML; MG/100ML; MG/100ML
100 INJECTION, SOLUTION INTRAVENOUS CONTINUOUS
OUTPATIENT
Start: 2024-07-16

## 2024-07-16 ASSESSMENT — PAIN SCALES - GENERAL: PAINLEVEL: 0-NO PAIN

## 2024-07-16 ASSESSMENT — ENCOUNTER SYMPTOMS
OCCASIONAL FEELINGS OF UNSTEADINESS: 1
DEPRESSION: 1
LOSS OF SENSATION IN FEET: 0

## 2024-07-16 NOTE — PATIENT INSTRUCTIONS
Breast surgery booklet, binder, and additional surgeon specific educational information provided to patient. Patient verbalized understanding and will contact RN if further instruction is needed.     Please reach out to your cardiologist as discussed during your visit in regard to pre-surgery clearance.

## 2024-07-19 ENCOUNTER — APPOINTMENT (OUTPATIENT)
Dept: NEUROLOGY | Facility: CLINIC | Age: 74
End: 2024-07-19
Payer: MEDICARE

## 2024-07-24 ENCOUNTER — TELEPHONE (OUTPATIENT)
Dept: SURGICAL ONCOLOGY | Facility: HOSPITAL | Age: 74
End: 2024-07-24

## 2024-08-05 NOTE — PROGRESS NOTES
Subjective   Reason for Visit: Dina Mullins is an 73 y.o. female here for a follow up.    HPI  Patient Care Team:  Ken Saleh PA-C as PCP - General (Internal Medicine)  CONSUELO Roach as PCP - [employer] PCP     Left arm pain:  Seeing ortho.  I gave Robaxin last visit.  Was found to have moderate OA in her arm.  Symptoms x a couple weeks.  No acute injury.  Difficulty with raising her arm.  No elbow/wrist/neck pains.  Mostly in the shoulder area.    Breast cancer:  Has a lumpectomy coming up in Aug 2024.  Was found to be invasive ductal carcinoma in July 2024.  Following with surgical oncology.  Screening and diagnostic mammograms were abnormal in May 2024.    HTN/A-fib:  Taking Lasix, Lisinopril, Xarelto.  Seeing cardiology.  BP today is 124/82.  Denies headaches.    Diabetes:  Taking Lantus 25 units daily, Rybelsus.  Last A1C was 7.7 in May 2024.  POC today is 5.8.    Dizziness/Tinnitus:  I referred to ENT and vestibular therapy previously.  She had recently been started on Dopamine by neurology when this started.  Taking Meclizine which helps.  She did have a normal brain MRI.  She does see neurology.  Does not follow with ENT.  Causing everyday struggles right now.    HLD:  Taking Atorvastatin.  Last checked Aug 2023.  DUE for labs.    Anxiety/Depression:  Taking Cymbalta.  Denies SI/HI.    Parkinson's:  Taking Dopamine now.  Seeing neurology.    Stage III CKD:  Creatinine is stable in May 2024.  DUE for labs.    Health maintenance:  Smoking: Never a smoker.  Mammogram (40-75): Abnormal screening/diagnostic mammo in April 2024. Following with surgical oncology.  Labs: Aug 2023. DUE for all labs  Colonoscopy (50-75): 2014  DEXA (65+, q 2 years): Dec 2021.  Prevnar 13 (65+):  Pneumovax 23 (65+, 1 year after Prev 13):  Influenza:  Zostavax (60+, 1 time, at pharmacy):    Review of Systems  12 point review of systems negative unless stated above in HPI    Objective   Vitals:  BP  124/82   Pulse 68   Wt 121 kg (266 lb 3.2 oz)   SpO2 98%   BMI 48.69 kg/m²       Physical Exam  General: Alert and oriented, well nourished, no acute distress.  Lungs: Clear to auscultation, non-labored respiration.  Heart: Normal rate, regular rhythm, no murmur, gallop or edema.  Neurologic: Awake, alert, and oriented X3, CN II-XII intact.  Psychiatric: Cooperative, appropriate mood and affect.    Assessment/Plan   It was good seeing you!  Keep up the good work with the A1C!  Good luck with your surgery!  I have ordered some labs to be done as soon as you can.  We will call you with the results.  Continue specialty care.  Continue the same medications.  Chronic conditions are stable.  Call with questions or concerns.    Follow up  4 months for A1C  Problem List Items Addressed This Visit          Cardiac and Vasculature    Benign essential HTN    Hyperlipidemia    Paroxysmal atrial fibrillation (Multi)    Atrial fibrillation (Multi)       Endocrine/Metabolic    Type 2 diabetes mellitus with obesity (Multi) - Primary    Relevant Orders    POCT glycosylated hemoglobin (Hb A1C) manually resulted (Completed)    Comprehensive Metabolic Panel    Lipid Panel    CBC and Auto Differential    Obesity, morbid (Multi)       Genitourinary and Reproductive    Stage 3b chronic kidney disease (Multi)       Hematology and Neoplasia    Malignant neoplasm of overlapping sites of right female breast (Multi)       Mental Health    Mixed anxiety and depressive disorder    Relevant Medications    DULoxetine (Cymbalta) 20 mg DR capsule       Musculoskeletal and Injuries    Left shoulder pain       Neuro    Parkinsons disease (Multi)       Symptoms and Signs    Dizziness

## 2024-08-07 ENCOUNTER — HOSPITAL ENCOUNTER (OUTPATIENT)
Dept: RADIOLOGY | Facility: HOSPITAL | Age: 74
Discharge: HOME | End: 2024-08-07
Payer: MEDICARE

## 2024-08-07 DIAGNOSIS — C50.811 MALIGNANT NEOPLASM OF OVERLAPPING SITES OF RIGHT FEMALE BREAST, UNSPECIFIED ESTROGEN RECEPTOR STATUS (MULTI): ICD-10-CM

## 2024-08-07 PROCEDURE — 19286 PERQ DEV BREAST ADD US IMAG: CPT | Mod: RT

## 2024-08-07 PROCEDURE — 19285 PERQ DEV BREAST 1ST US IMAG: CPT | Mod: RT

## 2024-08-07 PROCEDURE — 77065 DX MAMMO INCL CAD UNI: CPT | Performed by: RADIOLOGY

## 2024-08-07 PROCEDURE — 2780000003 HC OR 278 NO HCPCS

## 2024-08-07 PROCEDURE — 10035 PLMT SFT TISS LOCLZJ DEV 1ST: CPT | Performed by: RADIOLOGY

## 2024-08-07 PROCEDURE — A4648 IMPLANTABLE TISSUE MARKER: HCPCS

## 2024-08-07 PROCEDURE — 19285 PERQ DEV BREAST 1ST US IMAG: CPT | Performed by: RADIOLOGY

## 2024-08-07 PROCEDURE — 2500000005 HC RX 250 GENERAL PHARMACY W/O HCPCS: Performed by: RADIOLOGY

## 2024-08-07 PROCEDURE — 77065 DX MAMMO INCL CAD UNI: CPT

## 2024-08-07 RX ORDER — LIDOCAINE HYDROCHLORIDE 20 MG/ML
INJECTION, SOLUTION EPIDURAL; INFILTRATION; INTRACAUDAL; PERINEURAL AS NEEDED
Status: COMPLETED | OUTPATIENT
Start: 2024-08-07 | End: 2024-08-07

## 2024-08-08 ENCOUNTER — PRE-ADMISSION TESTING (OUTPATIENT)
Dept: PREADMISSION TESTING | Facility: HOSPITAL | Age: 74
End: 2024-08-08
Payer: MEDICARE

## 2024-08-08 VITALS
WEIGHT: 262 LBS | DIASTOLIC BLOOD PRESSURE: 51 MMHG | RESPIRATION RATE: 16 BRPM | HEART RATE: 59 BPM | BODY MASS INDEX: 48.21 KG/M2 | SYSTOLIC BLOOD PRESSURE: 137 MMHG | OXYGEN SATURATION: 97 % | HEIGHT: 62 IN | TEMPERATURE: 97.9 F

## 2024-08-08 PROCEDURE — 99214 OFFICE O/P EST MOD 30 MIN: CPT | Performed by: NURSE PRACTITIONER

## 2024-08-08 ASSESSMENT — DUKE ACTIVITY SCORE INDEX (DASI)
CAN YOU RUN A SHORT DISTANCE: NO
CAN YOU PARTICIPATE IN MODERATE RECREATIONAL ACTIVITIES LIKE GOLF, BOWLING, DANCING, DOUBLES TENNIS OR THROWING A BASEBALL OR FOOTBALL: NO
CAN YOU DO LIGHT WORK AROUND THE HOUSE LIKE DUSTING OR WASHING DISHES: YES
CAN YOU PARTICIPATE IN STRENOUS SPORTS LIKE SWIMMING, SINGLES TENNIS, FOOTBALL, BASKETBALL, OR SKIING: NO
TOTAL_SCORE: 15.45
CAN YOU CLIMB A FLIGHT OF STAIRS OR WALK UP A HILL: YES
CAN YOU DO YARD WORK LIKE RAKING LEAVES, WEEDING OR PUSHING A MOWER: NO
CAN YOU WALK INDOORS, SUCH AS AROUND YOUR HOUSE: YES
CAN YOU WALK A BLOCK OR TWO ON LEVEL GROUND: YES
CAN YOU HAVE SEXUAL RELATIONS: NO
CAN YOU TAKE CARE OF YOURSELF (EAT, DRESS, BATHE, OR USE TOILET): YES
CAN YOU DO MODERATE WORK AROUND THE HOUSE LIKE VACUUMING, SWEEPING FLOORS OR CARRYING GROCERIES: NO
DASI METS SCORE: 4.6
CAN YOU DO HEAVY WORK AROUND THE HOUSE LIKE SCRUBBING FLOORS OR LIFTING AND MOVING HEAVY FURNITURE: NO

## 2024-08-08 ASSESSMENT — ENCOUNTER SYMPTOMS
EYES NEGATIVE: 1
CARDIOVASCULAR NEGATIVE: 1
NEUROLOGICAL NEGATIVE: 1
CONSTITUTIONAL NEGATIVE: 1
ENDOCRINE NEGATIVE: 1
RESPIRATORY NEGATIVE: 1
MUSCULOSKELETAL NEGATIVE: 1
GASTROINTESTINAL NEGATIVE: 1

## 2024-08-08 NOTE — CPM/PAT H&P
CPM/PAT Evaluation       Name: Dina Mullins (Dina Mullins)  /Age: 1950/73 y.o.          Chief Complaint: breast mass       HPI: Dina Mullins a 73 year old female with a past medical history of atrial fibrillation on Xarelto, hypertension, diabetes, hyperlipidemia, chronic kidney disease, and parkinson's. Patient presents to pre-admission testing for evaluation prior to a right breast lumpectomy. Patient had an ultrasound core biopsy of a 2 right breast masses and a right axillary lymph node on 2024, revealed 2 separate breast cancers in the right breast and both are a different type of cancer. Patient denies any recent weight lost, breast pain, or nipple drainage. Patient does make note today that she has vertigo with tinnitus and states that is chronic but this has been happening more frequently in the past couple months. She also endorses fatigue. She did take her Meclizne this morning, and states that she is going to call ENT to try and get in sooner with them. Patient denies shortness of breath, trouble breathing, chest pains, fever, chills, nausea, or vomiting. No dysuria, no abdominal pain. Denies diarrhea or constipation. No blurred vision or light headed.      Past Medical History:   Diagnosis Date    Anemia     Arthritis     Atrial fib/flutter, transient (Multi)     Bilateral tinnitus     Chronic kidney failure     stage three    DM (diabetes mellitus) (Multi)     Hypertension     Left arm pain     Sepsis (Multi)     Septic shock (Multi)     Stomach ulcer     Vertigo        No past surgical history on file.    Patient Sexual activity questions deferred to the physician.    Family History   Problem Relation Name Age of Onset    Cancer Mother      Breast cancer Mother  69    Alzheimer's disease Father         Allergies   Allergen Reactions    Codeine Nausea/vomiting     Social History     Tobacco Use    Smoking status: Never     Passive exposure: Never    Smokeless  tobacco: Never   Vaping Use    Vaping status: Never Used   Substance Use Topics    Alcohol use: Never    Drug use: Never      Prior to Admission medications    Medication Sig Start Date End Date Taking? Authorizing Provider   acetaminophen (TylenoL) 325 mg tablet Take 2 tablets (650 mg) by mouth every 4 hours if needed for moderate pain (4 - 6) or mild pain (1 - 3).    Historical Provider, MD   atorvastatin (Lipitor) 40 mg tablet Take 1 tablet (40 mg) by mouth once daily at bedtime. 5/13/24   Ken Saleh PA-C   carbidopa-levodopa (Sinemet)  mg tablet Take 1 tablet by mouth 3 times a day.  Patient not taking: Reported on 8/8/2024 1/8/24 1/7/25  Teodoro Galarza MD   diphenhydrAMINE-acetaminophen (Tylenol PM)  mg per tablet Take 1 tablet by mouth as needed at bedtime for sleep.    Historical Provider, MD   DULoxetine (Cymbalta) 20 mg DR capsule Take 1 capsule (20 mg) by mouth once daily. 5/13/24 11/9/24  Ken Saleh PA-C   fluticasone (Flonase) 50 mcg/actuation nasal spray Administer 1 spray into each nostril once daily as needed for allergies or rhinitis. 10/23/23   Ken Saleh PA-C   furosemide (Lasix) 40 mg tablet Take 1 tablet (40 mg) by mouth once daily.  Patient taking differently: Take 1 tablet (40 mg) by mouth if needed. 5/13/24 11/9/24  Ken Saleh PA-C   Lantus Solostar U-100 Insulin 100 unit/mL (3 mL) pen Inject 25 Units under the skin once daily in the morning.  Patient taking differently: Inject 25 Units under the skin once daily at bedtime. 5/13/24   Ken Saleh PA-C   lisinopril 10 mg tablet Take 1 tablet (10 mg) by mouth once daily. 5/13/24 11/9/24  Ken Saleh PA-C   meclizine (Antivert) 25 mg tablet Take 1 tablet (25 mg) by mouth 3 times a day as needed for dizziness for up to 20 doses. 5/23/24   Travon Rosales PA-C   meclizine (Antivert) 25 mg tablet Take 1 tablet (25 mg) by mouth 3 times a day as needed for dizziness. 7/3/24   Ken Saleh PA-C   methocarbamol  "(Robaxin) 750 mg tablet Take 1 tablet (750 mg) by mouth 3 times a day. 5/13/24 6/12/24  Ken Saleh PA-C   OneTouch Verio test strips strip 1 strip by in vitro route 4 times a day. And as needed 2/2/24   Naila Montana MD   pen needle, diabetic 32 gauge x 5/32\" needle USE AS DIRECTED 10/24/23   Ken Saleh PA-C   rivaroxaban (Xarelto) 20 mg tablet Take 1 tablet (20 mg) by mouth once daily in the evening. Take with meals. 3/5/24   Rj Aburto MD      Review of Systems   Constitutional: Negative.   HENT: Negative.     Eyes: Negative.    Cardiovascular: Negative.    Respiratory: Negative.     Endocrine: Negative.    Skin: Negative.    Musculoskeletal: Negative.    Gastrointestinal: Negative.    Genitourinary: Negative.    Neurological: Negative.          Physical Exam  Vitals and nursing note reviewed.   Constitutional:       Appearance: Normal appearance.   HENT:      Nose: Nose normal.      Mouth/Throat:      Mouth: Mucous membranes are moist.   Eyes:      Pupils: Pupils are equal, round, and reactive to light.   Cardiovascular:      Rate and Rhythm: Normal rate and regular rhythm.      Pulses: Normal pulses.      Heart sounds: Normal heart sounds.   Pulmonary:      Effort: Pulmonary effort is normal.      Breath sounds: Normal breath sounds.   Abdominal:      General: Abdomen is flat. Bowel sounds are normal.      Palpations: Abdomen is soft.   Musculoskeletal:         General: Normal range of motion.   Skin:     General: Skin is warm and dry.      Capillary Refill: Capillary refill takes less than 2 seconds.   Neurological:      General: No focal deficit present.      Mental Status: She is alert and oriented to person, place, and time.   Psychiatric:         Mood and Affect: Mood normal.         Behavior: Behavior normal.        /51   Pulse 59   Temp 36.6 °C (97.9 °F) (Temporal)   Resp 16   Ht 1.575 m (5' 2\")   Wt 119 kg (262 lb)   SpO2 97%   BMI 47.92 kg/m²       PAT AIRWAY:   Airway: "     Mallampati::  II    TM distance::  >3 FB    Neck ROM::  Full    ASA: II  DASI RISK SCORE: 15.45  METS SCORE: 4.6  CHADS2 SCORE: 4%  REVISED CARDIAC RISK INDEX: 0.4%  STOP BANG SCORE: 5      Assessment and Plan:   Breast cancer   S/p biopsy confirming carcinoma. Patient to have a lumpectomy on 08/14/2024    Diabetes  -Continue Lantus  -Last HGA1C was 7.7 on 05/23/2024    Atrial fibrillation   -Continue Xarelto.   Hold 2 days prior to procedure per Dr. Crenshaw    Hypertension   -Continue lasix and lisinopril     CKD  -creatinine/ GFR stable    Vertigo/Tinnitus  -Continue Meclizine   -Recommend follow up with ENT sooner       CBC, CMP, HgA1C done on 05/23/2024  EKG completed on 05/23/2024- found in epic imaging/media      SARAH Dugan

## 2024-08-08 NOTE — PREPROCEDURE INSTRUCTIONS
Why must I stop eating and drinking near surgery time?  With sedation, food or liquid in your stomach can enter your lungs causing serious complications  Increases nausea and vomiting    When do I need to stop eating and drinking before my surgery?   Do not eat or drink after midnight the night before your surgery/procedure.  You may have small sips of water to take your medication.    PAT DISCHARGE INSTRUCTIONS    Please call the Same Day Surgery (SDS) Department of the hospital where your procedure will be performed after 2:00 PM the day before your surgery. If you are scheduled on a Monday, or a Tuesday following a Monday holiday, you will need to call on the last business day prior to your surgery.    [place] [id number] [place] [id number] [phone]  Second Floor      Please let your surgeon know if:      You develop any open sores, shingles, burning or painful urination as these may increase your risk of an infection.   You no longer wish to have the surgery.   Any other personal circumstances change that may lead to the need to cancel or defer this surgery-such as being sick or getting admitted to any hospital within one week of your planned procedure.    Your contact details change, such as a change of address or phone number.    Starting now:     Please DO NOT drink alcohol or smoke for 24 hours before surgery. It is well known that quitting smoking can make a huge difference to your health and recovery from surgery. The longer you abstain from smoking, the better your chances of a healthy recovery. If you need help with quitting, call 6-800-QUIT-NOW to be connected to a trained counselor who will discuss the best methods to help you quit.     Before your surgery:    Please stop all supplements 7 days prior to surgery. Or as directed by your surgeon.   Please stop taking NSAID pain medicine such as Advil and Motrin 7 days before surgery.    If you  develop any fever, cough, cold, rashes, cuts, scratches, scrapes, urinary symptoms or infection anywhere on your body (including teeth and gums) prior to surgery, please call your surgeon’s office as soon as possible. This may require treatment to reduce the chance of cancellation on the day of surgery.    The day before your surgery:   DIET- Please follow the diet instructions at the top of your packet.   Get a good night’s rest.  Use the special soap for bathing if you have been instructed to use one.    Scheduled surgery times may change and you will be notified if this occurs - please check your personal voicemail for any updates.     On the morning of surgery:   Wear comfortable, loose fitting clothes which open in the front. Please do not wear moisturizers, creams, lotions, makeup or perfume.    Please bring with you to surgery:   Photo ID and insurance card   Current list of medicines and allergies   Pacemaker/ Defibrillator/Heart stent cards   CPAP machine and mask    Slings/ splints/ crutches   A copy of your complete advanced directive/DHPOA.    Please do NOT bring with you to surgery:   All jewelry and valuables should be left at home.   Prosthetic devices such as contact lenses, hearing aids, dentures, eyelash extensions, hairpins and body piercings must be removed prior to going in to the surgical suite.    After outpatient surgery:   A responsible adult MUST accompany you at the time of discharge and stay with you for 24 hours after your surgery. You may NOT drive yourself home after surgery.    Do not drive, operate machinery, make critical decisions or do activities that require co-ordination or balance until after a night’s sleep.   Do not drink alcoholic beverages for 24 hours.   Instructions for resuming your medications will be provided by your surgeon.    CALL YOUR DOCTOR AFTER SURGERY IF YOU HAVE:     Chills and/or a fever of 101° F or higher.    Redness, swelling, pus or drainage from your  surgical wound or a bad smell from the wound.    Lightheadedness, fainting or confusion.    Persistent vomiting (throwing up) and are not able to eat or drink for 12 hours.    Three or more loose, watery bowel movements in 24 hours (diarrhea).   Difficulty or pain while urinating( after non-urological surgery)    Pain and swelling in your legs, especially if it is only on one side.    Difficulty breathing or are breathing faster than normal.    Any new concerning symptoms.         Medication List            Accurate as of August 8, 2024 10:15 AM. Always use your most recent med list.                atorvastatin 40 mg tablet  Commonly known as: Lipitor  Take 1 tablet (40 mg) by mouth once daily at bedtime.  Notes to patient: Take evening dose before surgery.     carbidopa-levodopa  mg tablet  Commonly known as: Sinemet  Take 1 tablet by mouth 3 times a day.  Notes to patient: NOT TAKING     diphenhydrAMINE-acetaminophen  mg per tablet  Commonly known as: Tylenol PM  Notes to patient: Take evening dose before surgery.     DULoxetine 20 mg DR capsule  Commonly known as: Cymbalta  Take 1 capsule (20 mg) by mouth once daily.  Medication Adjustments for Surgery: Take morning of surgery with sip of water, no other fluids     fluticasone 50 mcg/actuation nasal spray  Commonly known as: Flonase  Administer 1 spray into each nostril once daily as needed for allergies or rhinitis.  Medication Adjustments for Surgery: Take morning of surgery with sip of water, no other fluids     furosemide 40 mg tablet  Commonly known as: Lasix  Take 1 tablet (40 mg) by mouth once daily.  Notes to patient: Take as needed.  Hold dose day of surgery.     Loyda Lowery U-100 Insulin 100 unit/mL (3 mL) pen  Generic drug: insulin glargine  Inject 25 Units under the skin once daily in the morning.  Notes to patient: TAKE HALF THE DOSE THE EVENING BEFORE     lisinopril 10 mg tablet  Take 1 tablet (10 mg) by mouth once daily.  Notes to  "patient: HOLD A FULL 24 HOURS BEFORE SURGERY     * meclizine 25 mg tablet  Commonly known as: Antivert  Take 1 tablet (25 mg) by mouth 3 times a day as needed for dizziness for up to 20 doses.  Notes to patient: Take as needed.     * meclizine 25 mg tablet  Commonly known as: Antivert  Take 1 tablet (25 mg) by mouth 3 times a day as needed for dizziness.     methocarbamol 750 mg tablet  Commonly known as: Robaxin  Take 1 tablet (750 mg) by mouth 3 times a day.  Notes to patient: NOT TAKING     OneTouch Verio test strips strip  Generic drug: blood sugar diagnostic  1 strip by in vitro route 4 times a day. And as needed     pen needle, diabetic 32 gauge x 5/32\" needle  USE AS DIRECTED     rivaroxaban 20 mg tablet  Commonly known as: Xarelto  Take 1 tablet (20 mg) by mouth once daily in the evening. Take with meals.  Medication Adjustments for Surgery: Stop 2 days before surgery  Notes to patient: PER DR TOLLIVER INSTRUCTIONS     TylenoL 325 mg tablet  Generic drug: acetaminophen  Notes to patient: Take as needed.           * This list has 2 medication(s) that are the same as other medications prescribed for you. Read the directions carefully, and ask your doctor or other care provider to review them with you.                                                                        "

## 2024-08-12 ENCOUNTER — OFFICE VISIT (OUTPATIENT)
Dept: PRIMARY CARE | Facility: CLINIC | Age: 74
End: 2024-08-12
Payer: MEDICARE

## 2024-08-12 VITALS
BODY MASS INDEX: 48.69 KG/M2 | OXYGEN SATURATION: 98 % | DIASTOLIC BLOOD PRESSURE: 82 MMHG | SYSTOLIC BLOOD PRESSURE: 124 MMHG | HEART RATE: 68 BPM | WEIGHT: 266.2 LBS

## 2024-08-12 DIAGNOSIS — I10 BENIGN ESSENTIAL HTN: ICD-10-CM

## 2024-08-12 DIAGNOSIS — I48.11 LONGSTANDING PERSISTENT ATRIAL FIBRILLATION (MULTI): ICD-10-CM

## 2024-08-12 DIAGNOSIS — I48.0 PAROXYSMAL ATRIAL FIBRILLATION (MULTI): ICD-10-CM

## 2024-08-12 DIAGNOSIS — E78.00 PURE HYPERCHOLESTEROLEMIA: ICD-10-CM

## 2024-08-12 DIAGNOSIS — E11.69 TYPE 2 DIABETES MELLITUS WITH OBESITY (MULTI): Primary | ICD-10-CM

## 2024-08-12 DIAGNOSIS — N18.32 STAGE 3B CHRONIC KIDNEY DISEASE (MULTI): ICD-10-CM

## 2024-08-12 DIAGNOSIS — E66.9 TYPE 2 DIABETES MELLITUS WITH OBESITY (MULTI): Primary | ICD-10-CM

## 2024-08-12 DIAGNOSIS — C50.811 MALIGNANT NEOPLASM OF OVERLAPPING SITES OF RIGHT FEMALE BREAST, UNSPECIFIED ESTROGEN RECEPTOR STATUS (MULTI): ICD-10-CM

## 2024-08-12 DIAGNOSIS — F41.8 MIXED ANXIETY AND DEPRESSIVE DISORDER: ICD-10-CM

## 2024-08-12 DIAGNOSIS — R42 DIZZINESS: ICD-10-CM

## 2024-08-12 DIAGNOSIS — G20.A1 PARKINSON'S DISEASE, UNSPECIFIED WHETHER DYSKINESIA PRESENT, UNSPECIFIED WHETHER MANIFESTATIONS FLUCTUATE (MULTI): ICD-10-CM

## 2024-08-12 DIAGNOSIS — E66.01 OBESITY, MORBID (MULTI): ICD-10-CM

## 2024-08-12 DIAGNOSIS — M25.512 ACUTE PAIN OF LEFT SHOULDER: ICD-10-CM

## 2024-08-12 LAB — POC HEMOGLOBIN A1C: 5.8 (ref 4.2–6.5)

## 2024-08-12 PROCEDURE — 1036F TOBACCO NON-USER: CPT | Performed by: PHYSICIAN ASSISTANT

## 2024-08-12 PROCEDURE — 3074F SYST BP LT 130 MM HG: CPT | Performed by: PHYSICIAN ASSISTANT

## 2024-08-12 PROCEDURE — 1159F MED LIST DOCD IN RCRD: CPT | Performed by: PHYSICIAN ASSISTANT

## 2024-08-12 PROCEDURE — 1126F AMNT PAIN NOTED NONE PRSNT: CPT | Performed by: PHYSICIAN ASSISTANT

## 2024-08-12 PROCEDURE — 3079F DIAST BP 80-89 MM HG: CPT | Performed by: PHYSICIAN ASSISTANT

## 2024-08-12 PROCEDURE — 3051F HG A1C>EQUAL 7.0%<8.0%: CPT | Performed by: PHYSICIAN ASSISTANT

## 2024-08-12 PROCEDURE — 83036 HEMOGLOBIN GLYCOSYLATED A1C: CPT | Mod: QW | Performed by: PHYSICIAN ASSISTANT

## 2024-08-12 PROCEDURE — 99213 OFFICE O/P EST LOW 20 MIN: CPT | Performed by: PHYSICIAN ASSISTANT

## 2024-08-12 PROCEDURE — 1157F ADVNC CARE PLAN IN RCRD: CPT | Performed by: PHYSICIAN ASSISTANT

## 2024-08-12 PROCEDURE — 4010F ACE/ARB THERAPY RXD/TAKEN: CPT | Performed by: PHYSICIAN ASSISTANT

## 2024-08-12 PROCEDURE — 1160F RVW MEDS BY RX/DR IN RCRD: CPT | Performed by: PHYSICIAN ASSISTANT

## 2024-08-12 RX ORDER — DULOXETIN HYDROCHLORIDE 20 MG/1
20 CAPSULE, DELAYED RELEASE ORAL DAILY
Qty: 90 CAPSULE | Refills: 1 | Status: SHIPPED | OUTPATIENT
Start: 2024-08-12 | End: 2025-02-08

## 2024-08-12 ASSESSMENT — ENCOUNTER SYMPTOMS
LOSS OF SENSATION IN FEET: 0
OCCASIONAL FEELINGS OF UNSTEADINESS: 0
DEPRESSION: 0

## 2024-08-12 ASSESSMENT — LIFESTYLE VARIABLES
HOW OFTEN DO YOU HAVE SIX OR MORE DRINKS ON ONE OCCASION: NEVER
HOW MANY STANDARD DRINKS CONTAINING ALCOHOL DO YOU HAVE ON A TYPICAL DAY: PATIENT DOES NOT DRINK
SKIP TO QUESTIONS 9-10: 1
HOW OFTEN DO YOU HAVE A DRINK CONTAINING ALCOHOL: NEVER
AUDIT-C TOTAL SCORE: 0

## 2024-08-12 ASSESSMENT — PAIN SCALES - GENERAL: PAINLEVEL: 0-NO PAIN

## 2024-08-13 ENCOUNTER — TELEPHONE (OUTPATIENT)
Dept: PRIMARY CARE | Facility: CLINIC | Age: 74
End: 2024-08-13
Payer: MEDICARE

## 2024-08-13 DIAGNOSIS — R26.2 AMBULATORY DYSFUNCTION: Primary | ICD-10-CM

## 2024-08-14 ENCOUNTER — HOSPITAL ENCOUNTER (OUTPATIENT)
Dept: RADIOLOGY | Facility: HOSPITAL | Age: 74
Setting detail: OUTPATIENT SURGERY
Discharge: HOME | End: 2024-08-14
Payer: MEDICARE

## 2024-08-14 ENCOUNTER — HOSPITAL ENCOUNTER (OUTPATIENT)
Facility: HOSPITAL | Age: 74
Setting detail: OUTPATIENT SURGERY
Discharge: HOME | End: 2024-08-14
Attending: SURGERY | Admitting: SURGERY
Payer: MEDICARE

## 2024-08-14 ENCOUNTER — ANESTHESIA (OUTPATIENT)
Dept: OPERATING ROOM | Facility: HOSPITAL | Age: 74
End: 2024-08-14
Payer: MEDICARE

## 2024-08-14 ENCOUNTER — PHARMACY VISIT (OUTPATIENT)
Dept: PHARMACY | Facility: CLINIC | Age: 74
End: 2024-08-14
Payer: COMMERCIAL

## 2024-08-14 ENCOUNTER — ANESTHESIA EVENT (OUTPATIENT)
Dept: OPERATING ROOM | Facility: HOSPITAL | Age: 74
End: 2024-08-14
Payer: MEDICARE

## 2024-08-14 ENCOUNTER — HOSPITAL ENCOUNTER (OUTPATIENT)
Dept: RADIOLOGY | Facility: HOSPITAL | Age: 74
Discharge: HOME | End: 2024-08-14
Payer: MEDICARE

## 2024-08-14 VITALS
TEMPERATURE: 96.4 F | HEART RATE: 65 BPM | SYSTOLIC BLOOD PRESSURE: 130 MMHG | RESPIRATION RATE: 16 BRPM | DIASTOLIC BLOOD PRESSURE: 56 MMHG | OXYGEN SATURATION: 96 %

## 2024-08-14 DIAGNOSIS — C50.811 MALIGNANT NEOPLASM OF OVERLAPPING SITES OF RIGHT FEMALE BREAST, UNSPECIFIED ESTROGEN RECEPTOR STATUS (MULTI): ICD-10-CM

## 2024-08-14 DIAGNOSIS — Z17.1 MALIGNANT NEOPLASM OF OVERLAPPING SITES OF RIGHT BREAST IN FEMALE, ESTROGEN RECEPTOR NEGATIVE (MULTI): ICD-10-CM

## 2024-08-14 DIAGNOSIS — C50.811 MALIGNANT NEOPLASM OF OVERLAPPING SITES OF RIGHT BREAST IN FEMALE, ESTROGEN RECEPTOR NEGATIVE (MULTI): ICD-10-CM

## 2024-08-14 DIAGNOSIS — C50.811 MALIGNANT NEOPLASM OF OVERLAPPING SITES OF RIGHT FEMALE BREAST, UNSPECIFIED ESTROGEN RECEPTOR STATUS (MULTI): Primary | ICD-10-CM

## 2024-08-14 LAB — GLUCOSE BLD MANUAL STRIP-MCNC: 146 MG/DL (ref 74–99)

## 2024-08-14 PROCEDURE — 19301 PARTIAL MASTECTOMY: CPT | Performed by: SURGERY

## 2024-08-14 PROCEDURE — 38900 IO MAP OF SENT LYMPH NODE: CPT | Performed by: SURGERY

## 2024-08-14 PROCEDURE — 7100000009 HC PHASE TWO TIME - INITIAL BASE CHARGE: Performed by: SURGERY

## 2024-08-14 PROCEDURE — RXMED WILLOW AMBULATORY MEDICATION CHARGE

## 2024-08-14 PROCEDURE — 3430000001 HC RX 343 DIAGNOSTIC RADIOPHARMACEUTICALS: Performed by: SURGERY

## 2024-08-14 PROCEDURE — 76098 X-RAY EXAM SURGICAL SPECIMEN: CPT

## 2024-08-14 PROCEDURE — 82947 ASSAY GLUCOSE BLOOD QUANT: CPT

## 2024-08-14 PROCEDURE — 2500000005 HC RX 250 GENERAL PHARMACY W/O HCPCS: Performed by: SURGERY

## 2024-08-14 PROCEDURE — 7100000001 HC RECOVERY ROOM TIME - INITIAL BASE CHARGE: Performed by: SURGERY

## 2024-08-14 PROCEDURE — 2500000004 HC RX 250 GENERAL PHARMACY W/ HCPCS (ALT 636 FOR OP/ED): Performed by: SURGERY

## 2024-08-14 PROCEDURE — 88307 TISSUE EXAM BY PATHOLOGIST: CPT | Mod: TC,TRILAB,WESLAB | Performed by: SURGERY

## 2024-08-14 PROCEDURE — 38792 RA TRACER ID OF SENTINL NODE: CPT | Performed by: SURGERY

## 2024-08-14 PROCEDURE — 3700000002 HC GENERAL ANESTHESIA TIME - EACH INCREMENTAL 1 MINUTE: Performed by: SURGERY

## 2024-08-14 PROCEDURE — 3600000004 HC OR TIME - INITIAL BASE CHARGE - PROCEDURE LEVEL FOUR: Performed by: SURGERY

## 2024-08-14 PROCEDURE — 7100000010 HC PHASE TWO TIME - EACH INCREMENTAL 1 MINUTE: Performed by: SURGERY

## 2024-08-14 PROCEDURE — 3600000009 HC OR TIME - EACH INCREMENTAL 1 MINUTE - PROCEDURE LEVEL FOUR: Performed by: SURGERY

## 2024-08-14 PROCEDURE — 2720000007 HC OR 272 NO HCPCS: Performed by: SURGERY

## 2024-08-14 PROCEDURE — 3700000001 HC GENERAL ANESTHESIA TIME - INITIAL BASE CHARGE: Performed by: SURGERY

## 2024-08-14 PROCEDURE — A9520 TC99 TILMANOCEPT DIAG 0.5MCI: HCPCS | Performed by: SURGERY

## 2024-08-14 PROCEDURE — 96372 THER/PROPH/DIAG INJ SC/IM: CPT | Performed by: SURGERY

## 2024-08-14 PROCEDURE — 7100000002 HC RECOVERY ROOM TIME - EACH INCREMENTAL 1 MINUTE: Performed by: SURGERY

## 2024-08-14 PROCEDURE — A4649 SURGICAL SUPPLIES: HCPCS | Performed by: SURGERY

## 2024-08-14 PROCEDURE — 2500000004 HC RX 250 GENERAL PHARMACY W/ HCPCS (ALT 636 FOR OP/ED): Performed by: ANESTHESIOLOGIST ASSISTANT

## 2024-08-14 PROCEDURE — 38792 RA TRACER ID OF SENTINL NODE: CPT

## 2024-08-14 PROCEDURE — 38525 BIOPSY/REMOVAL LYMPH NODES: CPT | Performed by: SURGERY

## 2024-08-14 RX ORDER — ONDANSETRON HYDROCHLORIDE 2 MG/ML
4 INJECTION, SOLUTION INTRAVENOUS ONCE AS NEEDED
Status: DISCONTINUED | OUTPATIENT
Start: 2024-08-14 | End: 2024-08-14 | Stop reason: HOSPADM

## 2024-08-14 RX ORDER — TRAMADOL HYDROCHLORIDE 50 MG/1
50 TABLET ORAL EVERY 6 HOURS PRN
Qty: 12 TABLET | Refills: 0 | Status: SHIPPED | OUTPATIENT
Start: 2024-08-14

## 2024-08-14 RX ORDER — LIDOCAINE HYDROCHLORIDE AND EPINEPHRINE 10; 10 MG/ML; UG/ML
INJECTION, SOLUTION INFILTRATION; PERINEURAL AS NEEDED
Status: DISCONTINUED | OUTPATIENT
Start: 2024-08-14 | End: 2024-08-14 | Stop reason: HOSPADM

## 2024-08-14 RX ORDER — LIDOCAINE HYDROCHLORIDE 10 MG/ML
0.1 INJECTION INFILTRATION; PERINEURAL ONCE
Status: DISCONTINUED | OUTPATIENT
Start: 2024-08-14 | End: 2024-08-14 | Stop reason: HOSPADM

## 2024-08-14 RX ORDER — FENTANYL CITRATE 50 UG/ML
INJECTION, SOLUTION INTRAMUSCULAR; INTRAVENOUS AS NEEDED
Status: DISCONTINUED | OUTPATIENT
Start: 2024-08-14 | End: 2024-08-14

## 2024-08-14 RX ORDER — MEPERIDINE HYDROCHLORIDE 25 MG/ML
12.5 INJECTION INTRAMUSCULAR; INTRAVENOUS; SUBCUTANEOUS EVERY 10 MIN PRN
Status: DISCONTINUED | OUTPATIENT
Start: 2024-08-14 | End: 2024-08-14 | Stop reason: HOSPADM

## 2024-08-14 RX ORDER — PROPOFOL 10 MG/ML
INJECTION, EMULSION INTRAVENOUS AS NEEDED
Status: DISCONTINUED | OUTPATIENT
Start: 2024-08-14 | End: 2024-08-14

## 2024-08-14 RX ORDER — BUPIVACAINE HYDROCHLORIDE 5 MG/ML
INJECTION, SOLUTION PERINEURAL AS NEEDED
Status: DISCONTINUED | OUTPATIENT
Start: 2024-08-14 | End: 2024-08-14 | Stop reason: HOSPADM

## 2024-08-14 RX ORDER — HYDRALAZINE HYDROCHLORIDE 20 MG/ML
5 INJECTION INTRAMUSCULAR; INTRAVENOUS EVERY 30 MIN PRN
Status: DISCONTINUED | OUTPATIENT
Start: 2024-08-14 | End: 2024-08-14 | Stop reason: HOSPADM

## 2024-08-14 RX ORDER — ONDANSETRON HYDROCHLORIDE 2 MG/ML
INJECTION, SOLUTION INTRAVENOUS AS NEEDED
Status: DISCONTINUED | OUTPATIENT
Start: 2024-08-14 | End: 2024-08-14

## 2024-08-14 RX ORDER — MIDAZOLAM HYDROCHLORIDE 1 MG/ML
1 INJECTION, SOLUTION INTRAMUSCULAR; INTRAVENOUS ONCE AS NEEDED
Status: DISCONTINUED | OUTPATIENT
Start: 2024-08-14 | End: 2024-08-14 | Stop reason: HOSPADM

## 2024-08-14 RX ORDER — MIDAZOLAM HYDROCHLORIDE 1 MG/ML
INJECTION, SOLUTION INTRAMUSCULAR; INTRAVENOUS AS NEEDED
Status: DISCONTINUED | OUTPATIENT
Start: 2024-08-14 | End: 2024-08-14

## 2024-08-14 RX ORDER — SODIUM CHLORIDE, SODIUM LACTATE, POTASSIUM CHLORIDE, CALCIUM CHLORIDE 600; 310; 30; 20 MG/100ML; MG/100ML; MG/100ML; MG/100ML
100 INJECTION, SOLUTION INTRAVENOUS CONTINUOUS
Status: DISCONTINUED | OUTPATIENT
Start: 2024-08-14 | End: 2024-08-14 | Stop reason: HOSPADM

## 2024-08-14 RX ORDER — ALBUTEROL SULFATE 0.83 MG/ML
2.5 SOLUTION RESPIRATORY (INHALATION) ONCE AS NEEDED
Status: DISCONTINUED | OUTPATIENT
Start: 2024-08-14 | End: 2024-08-14 | Stop reason: HOSPADM

## 2024-08-14 RX ORDER — FENTANYL CITRATE 50 UG/ML
50 INJECTION, SOLUTION INTRAMUSCULAR; INTRAVENOUS EVERY 5 MIN PRN
Status: DISCONTINUED | OUTPATIENT
Start: 2024-08-14 | End: 2024-08-14 | Stop reason: HOSPADM

## 2024-08-14 RX ORDER — ISOSULFAN BLUE 50 MG/5ML
INJECTION, SOLUTION SUBCUTANEOUS AS NEEDED
Status: DISCONTINUED | OUTPATIENT
Start: 2024-08-14 | End: 2024-08-14 | Stop reason: HOSPADM

## 2024-08-14 SDOH — HEALTH STABILITY: MENTAL HEALTH: CURRENT SMOKER: 0

## 2024-08-14 ASSESSMENT — PAIN SCALES - GENERAL
PAINLEVEL_OUTOF10: 2
PAINLEVEL_OUTOF10: 0 - NO PAIN
PAINLEVEL_OUTOF10: 0 - NO PAIN
PAINLEVEL_OUTOF10: 2
PAIN_LEVEL: 2

## 2024-08-14 ASSESSMENT — COLUMBIA-SUICIDE SEVERITY RATING SCALE - C-SSRS
6. HAVE YOU EVER DONE ANYTHING, STARTED TO DO ANYTHING, OR PREPARED TO DO ANYTHING TO END YOUR LIFE?: NO
1. IN THE PAST MONTH, HAVE YOU WISHED YOU WERE DEAD OR WISHED YOU COULD GO TO SLEEP AND NOT WAKE UP?: NO
2. HAVE YOU ACTUALLY HAD ANY THOUGHTS OF KILLING YOURSELF?: NO

## 2024-08-14 ASSESSMENT — PAIN - FUNCTIONAL ASSESSMENT
PAIN_FUNCTIONAL_ASSESSMENT: 0-10

## 2024-08-14 NOTE — ANESTHESIA PREPROCEDURE EVALUATION
Patient: Dina Mullins    Procedure Information       Date/Time: 08/14/24 1105    Procedures:       Lumpectomy with Magseed Localization x2 (Right)      Axillary [place] Lymph Node Biopsy and Excision of Axillary Lymph Node with Magseed Localization (Right) - NM injection, Lymphazurin, neoprobe, sentimag probe, radiology for specimen radiograph    [facility]: [facility] / [facility]    Surgeons: Mabel Crenshaw MD            Relevant Problems   Cardiac   (+) Atrial fibrillation (Multi)   (+) Benign essential HTN   (+) Chest pain   (+) Hyperlipidemia   (+) Paroxysmal atrial fibrillation (Multi)      Neuro   (+) Mixed anxiety and depressive disorder      GI   (+) Gastric ulcer      /Renal   (+) Acute lower urinary tract infection      Endocrine   (+) Obesity, morbid (Multi)   (+) Type 2 diabetes mellitus with obesity (Multi)      Hematology   (+) Anemia due to chronic blood loss      Musculoskeletal   (+) Degenerative polyarthritis   (+) Primary osteoarthritis of both knees      ID   (+) Acute lower urinary tract infection      GYN   (+) Malignant neoplasm of overlapping sites of right female breast (Multi)       Clinical information reviewed:   Tobacco  Allergies  Meds  Problems  Med Hx  Surg Hx  OB Status    Fam Hx  Soc Hx        NPO Detail:  NPO/Void Status  Date of Last Liquid: 08/13/24  Time of Last Liquid: 2350  Date of Last Solid: 08/13/24  Time of Last Solid: 2200  Time of Last Void: 0900         Physical Exam    Airway  Mallampati: II  TM distance: >3 FB  Neck ROM: full     Cardiovascular - normal exam     Dental    Pulmonary - normal exam     Abdominal - normal exam             Anesthesia Plan    History of general anesthesia?: yes  History of complications of general anesthesia?: no    ASA 2     general     The patient is not a current smoker.  Patient was not previously instructed to abstain from smoking on day of procedure.  Patient did not smoke on day of procedure.  Education provided  regarding risk of obstructive sleep apnea.  intravenous induction   Postoperative administration of opioids is intended.  Trial extubation is planned.  Anesthetic plan and risks discussed with patient.  Use of blood products discussed with patient who consented to blood products.    Plan discussed with CAA, attending and CRNA.

## 2024-08-14 NOTE — OP NOTE
Lumpectomy with Magseed Localization x2 (R), Axillary [place] Lymph Node Biopsy and Excision of Axillary Lymph Node with Magseed Localization (R) Operative Note     Date: 2024  OR Location: [facility]    Name: Dina Mullins, : 1950, Age: 73 y.o., MRN: [id number], Sex: female    Diagnosis  Pre-op Diagnosis      * Malignant neoplasm of overlapping sites of right female breast, unspecified estrogen receptor status (Multi) [C50.811] Post-op Diagnosis     * Malignant neoplasm of overlapping sites of right female breast, unspecified estrogen receptor status (Multi) [C50.811]     Procedures  Lumpectomy with Magseed Localization x2  47030 - [place] MASTECTOMY PARTIAL    Axillary [place] Lymph Node Biopsy and Excision of Axillary Lymph Node with Magseed Localization  85326 - [place] BX/EXC LYMPH NODE OPEN DEEP AXILLARY NODE      Surgeons      * Mabel Crenshaw - Primary    Resident/Fellow/Other Assistant:  Surgeons and Role:  * No surgeons found with a matching role *    Procedure Summary  Anesthesia: General  ASA: II  Anesthesia Staff: Anesthesiologist: Fidel Gavin MD  C-AA: DWAYNE Alcazar  Estimated Blood Loss: 5mL  Intra-op Medications:   Administrations occurring from 1105 to 1250 on 24:   Medication Name Total Dose   lidocaine-epinephrine (Xylocaine W/EPI) 1 %-1:100,000 injection 10 mL   BUPivacaine HCl (Marcaine) 0.5 % (5 mg/mL) injection 10 mL   isosulfan blue (Lumphazurin) 1 % injection 3 mL   lactated Ringer's infusion Cannot be calculated              Anesthesia Record               Intraprocedure I/O Totals       None           Specimen:   ID Type Source Tests Collected by Time   1 : right breast lumpectomy 11 O'clock    stitches black  long lateral, short superior painted Tissue BREAST LUMPECTOMY RIGHT SURGICAL PATHOLOGY EXAM Mabel Crenshaw MD 2024 1149   2 : right breast lumpectomy 12 O'clock stitches black long lateral, short superior painted Tissue BREAST LUMPECTOMY RIGHT SURGICAL  PATHOLOGY EXAM Mabel Crenshaw MD 8/14/2024 1153   3 : # 1 sentinel lymph node right breast with mag seed    target count = 128 Tissue SENTINEL LYMPH NODE BREAST RIGHT SURGICAL PATHOLOGY EXAM Mabel Crenshaw MD 8/14/2024 1203        Staff:   Circulator: Praveen Piper Person: Cris Maddox Scrub: Beto           Indications: Dina Mullins is an 73 y.o. female who is having surgery for Malignant neoplasm of overlapping sites of right female breast, unspecified estrogen receptor status (Multi) [C50.811].     The patient was seen in the preoperative area. The risks, benefits, complications, treatment options, non-operative alternatives, expected recovery and outcomes were discussed with the patient. The possibilities of reaction to medication, pulmonary aspiration, injury to surrounding structures, bleeding, recurrent infection, the need for additional procedures, failure to diagnose a condition, and creating a complication requiring transfusion or operation were discussed with the patient. The patient concurred with the proposed plan, giving informed consent.  The site of surgery was properly noted/marked if necessary per policy. The patient has been actively warmed in preoperative area. Preoperative antibiotics are not indicated. Venous thrombosis prophylaxis have been ordered including bilateral sequential compression devices    Procedure Details:   Operative indications: The patient had a mammogram showing 2 masses in the right breast. A core biopsy showed invasive ductal carcinoma, triple negative in both areas. Surgical options were reviewed in detail with the patient. The patient chose to proceed with a lumpectomy x2 and axillary sentinel lymph node biopsy.  She also had a lymph node biopsied which was negative.  We planned to place a Magseed and excise the previously biopsied left axillary lymph node.  The risks, benefits and procedure were discussed with the patient including bleeding, infection, scar  tissue formation, decreased function, mobility, or strength of the upper extremity, pain and numbness of the axilla and upper arm, lymphedema and general anesthesia. She understood all risks and agreed to proceed.     Operative report: The patient was taken to the operating room and placed on the table in the supine position. A timeout was performed. The site was marked preoperatively. She received general anesthesia without complication.  Once in the operating room and after anesthesia was obtained, the patient had the injection of technetium 99m tilmanocept lymphoseek into 2 perireolar areas of the right breast and the injection of 4 cc of Lymphazurin into 4 periareolar areas of the right breast and massaged into the breast for 5 minutes. The sentimag probe was used to identify the Magseed in the breast. The patient was prepped and draped in the usual sterile fashion. The lumpectomies were performed. Local anesthesia was obtained and then an incision was made in the upper outer right breast.  The more lateral 11:00 lumpectomy was performed first.  Dissection continued with Metzenbaum scissors and a Bovie electrocautery. The sentimag probe again was used to identify the Magseed. The area of tissue surrounding the Magseed was grasped with an Allis clamp. The tissue was removed using Metzenbaum scissors. It was labeled with a short stitch superiorly and a long stitch laterally. The sentimag probe was used to confirm that the Magseed was present in the tissue specimen. The cavity was swept with the sentimag probe and no activity was noted. The specimen was labeled as right breast lumpectomy 11:00 and painted with the vector surgical margin marker kit. The more medial 12:00 lumpectomy was next performed.  The second Magseed was identified in the medial aspect of the previous lumpectomy cavity.  Dissection continued with Metzenbaum scissors and a Bovie electrocautery. The area of tissue surrounding the Magseed was  grasped with an Allis clamp. The tissue was removed using Metzenbaum scissors.  This second lumpectomy tissue was immediately adjacent to the previous lumpectomy tissue which was more lateral.  It was labeled with a short stitch superiorly and a long stitch laterally. The sentimag probe was used to confirm that the second Magseed was present in the second tissue specimen. The cavity was swept with the sentimag probe and no activity was noted. The specimen was labeled as right breast lumpectomy 12:00 and painted with the vector surgical margin marker kit.  Both specimens were sent to radiology and a specimen radiograph of each specimen confirmed that the Magseed, tissue marker clip and area of concern were within each tissue specimen. The specimens were then sent to pathology. Hemostasis was achieved with Bovie electrocautery. After adequate hemostasis, the wound was irrigated and the irrigation fluid was suctioned out. Clips were placed to elvi both lumpectomy cavities.  Sia was injected into the lumpectomy cavity.  Tissue rearrangement was performed and a deep layer was closed with interrupted 3-0 Vicryl stitches.  A superficial, subcutaneous layer was closed with interrupted 3-0 Vicryl stitches. The skin was closed with a running subcuticular 4-0 monocryl stitch.  Next, a right axillary sentinel lymph node biopsy was performed.  Local anesthesia was obtained and then an incision was made with a 15 blade scalpel in the axilla inferior to the axillary hairline. Dissection was continued with the scalpel. The clavicopectoral fascia was incised, gaining entrance into the axilla. The long thoracic nerve and thoracodorsal nerve were identified in order to avoid injury.  The Sentimag probe was used to identify the Magseed in the axilla.  A blue stained lymph node was identified in this region as well.  This lymph node was grasped with an Allis clamp and dissection was done with the LigaSure in order to excise this  lymph node.  This also showed activity with the neoprobe. This lymph node was identified as right axillary sentinel lymph node #1 with Magseed. Target count was 128.  The specimen was sent to radiology and a specimen radiograph confirmed that the lymph node, HydroMARK clip and Magseed were present in the tissue specimen.  The specimen was then sent to pathology.  At this point, there were no other blue stained lymph nodes, no further activity noted with the neoprobe and no abnormally palpated lymph nodes. After adequate hemostasis, the wound was irrigated and the irrigation fluid was suctioned out.  A subcutaneous layer was closed with interrupted 3-0 Vicryl stitches. The skin was closed with a running, subcuticular 4-0 monocryl stitch. Steri-Strips were placed on both incisions followed by fluffs and a Surgi-Bra. She tolerated the procedure well and transferred to PACU in stable condition.   Complications:  None; patient tolerated the procedure well.    Disposition: PACU - hemodynamically stable.  Condition: stable     [place] Node Biopsy for Breast Cancer  Operation performed with curative intent Yes   Tracer(s) used to identify sentinel nodes in the upfront surgery (non-neoadjuvant) setting Radioactive tracer and dye   Tracer(s) used to identify sentinel nodes in the neoadjuvant setting N/A   All nodes (colored or non-colored) present at the end of a dye-filled lymphatic channel were removed Yes   All significantly radioactive nodes were removed Yes   All palpably suspicious nodes were removed Yes   Biopsy-proven positive nodes marked with clips prior to chemotherapy were identified and removed N/A            Additional Details: I personally reviewed the specimen radiograph.      Attending Attestation: I performed the procedure.    Mabel Crenshaw  Phone Number: [phone]

## 2024-08-14 NOTE — ANESTHESIA POSTPROCEDURE EVALUATION
Patient: Dina Mullins    Procedure Summary       Date: 08/14/24 Room / [facility]: [facility]    Anesthesia Start: 1115 Anesthesia Stop: 1257    Procedures:       Lumpectomy with Magseed Localization x2 (Right)      Axillary [place] Lymph Node Biopsy and Excision of Axillary Lymph Node with Magseed Localization (Right) Diagnosis:       Malignant neoplasm of overlapping sites of right female breast, unspecified estrogen receptor status (Multi)      (Malignant neoplasm of overlapping sites of right female breast, unspecified estrogen receptor status (Multi) [C50.811])    Surgeons: Mabel Crenshaw MD Responsible Provider: Fidel Gavin MD    Anesthesia Type: general ASA Status: 2            Anesthesia Type: general    Vitals Value Taken Time   /77 08/14/24 1332   Temp 36 °C (96.8 °F) 08/14/24 1330   Pulse 62 08/14/24 1333   Resp 10 08/14/24 1333   SpO2 98 % 08/14/24 1333   Vitals shown include unfiled device data.    Anesthesia Post Evaluation    Patient location during evaluation: PACU  Patient participation: complete - patient participated  Level of consciousness: sleepy but conscious  Pain score: 2  Pain management: adequate  Multimodal analgesia pain management approach  Airway patency: patent  Cardiovascular status: acceptable  Respiratory status: acceptable  Hydration status: acceptable  Postoperative Nausea and Vomiting: none        There were no known notable events for this encounter.

## 2024-08-14 NOTE — POST-PROCEDURE NOTE
I brought patient over from PACU.  She is alert and awake.  Dressing to right breast, C/D/I, no drainage.  She is wearing a surgical bra.  Spouse brought back to room.  Tolerated a snack without N&V.  Discharge instructions reviewed, verbalized understanding.  Assisted patient OOB and helped her get dressed.  Gait a little unsteady and she is using her own cane.

## 2024-08-14 NOTE — ANESTHESIA PROCEDURE NOTES
Airway  Date/Time: 8/14/2024 11:23 AM  Urgency: elective    Airway not difficult    Staffing  Performed: DWAYNE   Authorized by: Fidel Gavin MD    Performed by: DWAYNE Alcazar  Patient location during procedure: OR    Indications and Patient Condition  Indications for airway management: anesthesia  Spontaneous Ventilation: absent  Sedation level: deep  Preoxygenated: yes  MILS maintained throughout  Mask difficulty assessment: 0 - not attempted  Planned trial extubation    Final Airway Details  Final airway type: supraglottic airway      Successful airway: classic  Size 3     Number of attempts at approach: 1

## 2024-08-16 ENCOUNTER — TELEPHONE (OUTPATIENT)
Dept: SURGICAL ONCOLOGY | Facility: CLINIC | Age: 74
End: 2024-08-16
Payer: MEDICARE

## 2024-08-23 DIAGNOSIS — C50.811 MALIGNANT NEOPLASM OF OVERLAPPING SITES OF RIGHT FEMALE BREAST, UNSPECIFIED ESTROGEN RECEPTOR STATUS (MULTI): ICD-10-CM

## 2024-08-28 ENCOUNTER — TELEPHONE (OUTPATIENT)
Dept: SURGERY | Facility: HOSPITAL | Age: 74
End: 2024-08-28
Payer: MEDICARE

## 2024-08-28 LAB
CLINICAL SIG UPDATED INFO: NORMAL
LAB AP ASR DISCLAIMER: NORMAL
LAB AP BLOCK FOR ADDITIONAL STUDIES: NORMAL
LABORATORY COMMENT REPORT: NORMAL
PATH REPORT.FINAL DX SPEC: NORMAL
PATH REPORT.GROSS SPEC: NORMAL
PATH REPORT.RELEVANT HX SPEC: NORMAL
PATH REPORT.TOTAL CANCER: NORMAL
PATHOLOGY SYNOPTIC REPORT: NORMAL

## 2024-08-28 NOTE — TELEPHONE ENCOUNTER
Result Communication    Resulted Orders   Surgical Pathology Exam   Result Value Ref Range    Case Report       Surgical Pathology                                Case: [id number]                                  Authorizing Provider:  Mabel Crenshaw MD            Collected:           08/14/2024 1149              Ordering Location:     [facility] [place] Received:            08/14/2024 1325                                     OR                                                                           Pathologist:           Easton You,                                                          Specimens:   A) - BREAST LUMPECTOMY RIGHT, right breast lumpectomy 11 O'clock    stitches black                  long lateral, short superior painted                                                                B) - BREAST LUMPECTOMY RIGHT, right breast lumpectomy 12 O'clock stitches black long                lateral, short superior painted                                                                     C) - SENTINEL LYMPH NODE BREAST RIGHT, # 1 sentinel lymph node right breast with mag                 seed    target count = 128                                                                 FINAL DIAGNOSIS       A. 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   -- Changes consistent with site of previous biopsy.      B. Right breast lumpectomy at 12 o'clock:   -- Invasive ductal carcinoma, see synoptic report.  -- Changes consistent with site of previous biopsy.     C. Right breast sentinel lymph node with magseed, excision:  -- One of two lymph nodes involved by micrometastasis, see note.   -- Changes consistent with site of previous biopsy.     Note: The largest contiguous focus of tumor involving the lymph node measures 1.6 mm consistent with micrometastasis.  "Immunohistochemical stains for cytokeratin AE1/3 confirm the presence of tumor in the lymph node. The tumor morphologically appears lower grade and is estrogen and progesterone receptor strongly and diffusely positive consistent with a metastasis of the lower grade tumor at 12 o'clock. No extranodal extension is identified. Multiple deeper levels were reviewed.     peb                  By the signature on this report, the individual or group listed as making the Final Interpretation/Diagnosis certifies that they have reviewed this case.       Case Summary Report       INVASIVE CARCINOMA OF THE BREAST: Resection   INVASIVE CARCINOMA OF THE BREAST: RESECTION - A   8th Edition - Protocol posted: 9/20/2023      SPECIMEN      Procedure:    Excision (less than total mastectomy)       Specimen Laterality:    Right       TUMOR      Tumor Site:    Clock position       :    11 o'clock     Histologic Type:    Metaplastic carcinoma     Histologic Grade ([place] Histologic Score):          Glandular (Acinar) / Tubular Differentiation:    Score 3       Nuclear Pleomorphism:    Score 3       Mitotic Rate:    Score 3       Overall Grade:    Grade 3 (scores of 8 or 9)     Tumor Size:    Greatest dimension of largest invasive focus (Millimeters): 24 mm    Ductal Carcinoma In Situ (DCIS):    Present       :    Negative for extensive intraductal component (EIC)       Architectural Patterns:    Solid       Nuclear Grade:    Grade III (high)       Necrosis:    Present, central (expansive \"comedo\" necrosis)     Lobular Carcinoma In Situ (LCIS):    Not identified     Lymphatic and / or Vascular Invasion:    Cannot be determined: Indeterminant      Dermal Lymphatic and / or Vascular Invasion:    No skin present     Microcalcifications:    Present in DCIS     Treatment Effect in the Breast:    No known presurgical therapy       MARGINS    Margin Status for Invasive Carcinoma:    All margins negative for invasive carcinoma       Distance " from Invasive Carcinoma to Closest Margin:    Invasive carcinoma is 1.8 mm from the anterior/medial margin mm    Margin Status for DCIS:    All margins negative for DCIS       Distance from DCIS to Closest Margin:    DCIS is < 1mm from the anterior/medial margin mm      REGIONAL LYMPH NODES    Regional Lymph Node Status:          :    All regional lymph nodes negative for tumor       Total Number of Lymph Nodes Examined (sentinel and non-sentinel):    2       Number of [place] Nodes Examined:    2       pTNM CLASSIFICATION (AJCC 8th Edition)      Reporting of pT, pN, and (when applicable) pM categories is based on information available to the pathologist at the time the report is issued. As per the AJCC (Chapter 1, 8th Ed.) it is the managing physician’s responsibility to establish the final pathologic stage based upon all pertinent information, including but potentially not limited to this pathology report.    pT Category:    pT2     T Suffix:    (m)     pN Category:    pN0     N Suffix:    (sn)        Breast Biomarker Testing Performed on Previous Biopsy:          Estrogen Receptor (ER) Status:    Negative     Breast Biomarker Testing Performed on Previous Biopsy:          Progesterone Receptor (PgR) Status:    Negative     Breast Biomarker Testing Performed on Previous Biopsy:          HER2 (by immunohistochemistry):    Negative (Score 1+)       Testing Performed on Case Number:    Prior core biopsy: [id number]        Comment(s):    1. Immunohistochemical stains show positive staining in the tumor displaying squamoid features for CK5/6 with focal positive staining for p63. 2. Given the tumor in the lymph node is low-grade and estrogen receptor positive, the stage listed for the grade 3 tumor is pN0.    INVASIVE CARCINOMA OF THE BREAST: Resection   INVASIVE CARCINOMA OF THE BREAST: RESECTION - B   8th Edition - Protocol posted: 9/20/2023      SPECIMEN      Procedure:    Excision (less than total mastectomy)        Specimen Laterality:    Right       TUMOR      Tumor Site:    Clock position       :    12 o'clock     Histologic Type:    Invasive carcinoma of no special type (ductal)     Histologic Grade ([place]ika Histologic Score):          Glandular (Acinar) / Tubular Differentiation:    Score 2       Nuclear Pleomorphism:    Score 2       Mitotic Rate:    Score 1       Overall Grade:    Grade 1 (scores of 3, 4 or 5)     Tumor Size:    Greatest dimension of largest invasive focus (Millimeters): 14 mm    Ductal Carcinoma In Situ (DCIS):    Not identified     Lobular Carcinoma In Situ (LCIS):    Not identified     Lymphatic and / or Vascular Invasion:    Cannot be determined: Indeterminant      Dermal Lymphatic and / or Vascular Invasion:    No skin present     Microcalcifications:    Present in invasive carcinoma     Treatment Effect in the Breast:    No known presurgical therapy       MARGINS    Margin Status for Invasive Carcinoma:    All margins negative for invasive carcinoma       Distance from Invasive Carcinoma to Closest Margin:    Invasive carcinoma is < 1mm from the posterior margin, 1.6 mm from the anterior margin, and 2.1 mm from the medial margin. mm      REGIONAL LYMPH NODES    Regional Lymph Node Status:          :    Tumor present in regional lymph node(s)         Number of Lymph Nodes with Macrometastases:    0         Number of Lymph Nodes with Micrometastases:    1         Size of Largest David Metastatic Deposit:    1.6 mm        Extranodal Extension:    Not identified       Total Number of Lymph Nodes Examined (sentinel and non-sentinel):    2       Number of [place] Nodes Examined:    2       pTNM CLASSIFICATION (AJCC 8th Edition)      Reporting of pT, pN, and (when applicable) pM categories is based on information available to the pathologist at the time the report is issued. As per the AJCC (Chapter 1, 8th Ed.) it is the managing physician’s responsibility to establish the final pathologic stage based  "upon all pertinent information, including but potentially not limited to this pathology report.    pT Category:    pT1c     T Suffix:    (m)     pN Category:    pN1mi     N Suffix:    (sn)        Breast Biomarker Testing Performed on Previous Biopsy:          Estrogen Receptor (ER) Status:    Positive (greater than 10% of cells demonstrate nuclear positivity)         Percentage of Cells with Nuclear Positivity:    >95 %    Breast Biomarker Testing Performed on Previous Biopsy:          Progesterone Receptor (PgR) Status:    Positive         Percentage of Cells with Nuclear Positivity:    85 %    Breast Biomarker Testing Performed on Previous Biopsy:          HER2 (by immunohistochemistry):    Equivocal (Score 2+)         Percentage of Cells with Uniform Intense Complete Membrane Staining:    Cannot be determined     Breast Biomarker Testing Performed on Previous Biopsy:          HER2 (by in situ hybridization):    Negative (not amplified)       Testing Performed on Case Number:    Prior core biopsy [id number]       Block for Additional Biomarkers/Molecular Studies       Tumor Block: Grade 3 tumor: A13; Grade 1 tumor: B6      Clinical History       Pre-op diagnosis:  Malignant neoplasm of overlapping sites of right female breast, unspecified estrogen receptor status (Multi) [C50.811]      Gross Description       A:  Received in formalin, in a Dubin device, labeled with the patient's name and hospital number, and \"#1 right breast lumpectomy *,\" is an irregular segment of yellow lobulated fibrofatty tissue, 7.6 (medial to lateral) x 6.3 (superior to inferior) x 1.9 (anterior to posterior) cm.  A skin ellipse is not present.  The accompanying radiograph is labeled \"BI RAD BREAST EXAM SPECIMEN\" and the mammographic abnormality is found in the region designated overlapping sites.  The specimen is oriented with a short superior and a long lateral stitch.  A localizing needle is not identified.  The specimen is inked in the " "following manner:  anterior green, posterior black, superior red, inferior blue, medial yellow, and lateral orange.  The specimen is serially sectioned from medial to lateral into 19 slices.      The cut surface of slices 1-8 reveals an ill-defined, yellow-white, firm, stellate lesion which cuts with a gritty sensation, 2.4 x 1.0 x 0.7 cm.  The lesion abuts the medial margin, is 0.7 cm from the posterior margin, 1.2 cm from the anterior margin, 1.7 cm from the inferior margin, 3.5 cm from the superior margin, and 4.6 cm from the lateral margin.  A gel-filled biopsy cavity with a Magseed and open spiral clip is identified within the lesion of slices 3 and 4, measuring 1.0 x 0.4 x 0.4 cm and is located 0.2 cm from the medial margin.  The remainder of the breast parenchyma is unremarkable.  No other lesions are identified. Representative sections are submitted in 21 cassettes.  MAD    NOTE:  Ischemia time: 8/14/24 at 1149.  This specimen was placed into formalin at: 8/14/24 at 1230.     Summary of Cassettes:  Specimen Label Site  A  1-2 Slice 1, most medial end, closest medial margin, perpendicular    3-4 Slice 2 with lesion, and closest posterior and inferior margins    5-6 Slice 3 with lesion and biopsy cavity     7-8 Slice 4 with lesion, biopsy cavity (spiral clip and Magseed), and closest anterior margin    9-10 Slice 5 with lesion    11-12 Slice 6 with lesion    13-14 Slice 7 with lesion    15-16 Slice 8 with lesion    17-18 Slice 9, adjacent to lesion    19-20 Slice 17    21 Slice 19, most lateral end, perpendicular    [facility]  [address] [place] [address]  Phone: [phone] Fax: [phone]  B:  Received in formalin, in a Dubin device, labeled with the patient's name and hospital number, and \"#2 right breast lumpectomy *,\" is an irregular segment of yellow lobulated fibrofatty tissue, 9.4 (superior to inferior) x 6.4 (medial to lateral) x 1.3 (anterior to " "posterior) cm.  A skin ellipse is not present.  The accompanying radiograph is labeled \"BI RAD BREAST EXAM SPECIMEN\" and the mammographic abnormality is found in the region designated overlapping sites.  The specimen is oriented with a short superior and a long lateral stitch.  A localizing needle is not identified.  The specimen is inked in the following manner:  anterior green, posterior black, superior red, inferior blue, medial yellow, and lateral orange.  The specimen is serially sectioned from superior to inferior into 22 slices.      The cut surface of slices 9-12 reveals an ill-defined, yellow-white, firm lesion which cuts with a gritty sensation, 1.4 x 1.0 x 0.8 cm.  The lesion abuts the posterior and medial margins, is 0.2 cm from the anterior margin, 1.3 cm from the superior margin, 4.0 cm from the lateral margin, and 4.2 cm from the inferior margin.  A gel-filled biopsy cavity with a spiral clip is identified within the lesions of slices 9-12, measuring 1.0 x 0.5 x 0.3 cm and is located 0.4 cm from the anterior margin.  A Magseed is also identified within the fibroadipose tissue of slice 12, located 0.6 cm from the posterior margin.  The remainder of the breast parenchyma is unremarkable.  No other lesions are grossly identified. Representative sections are submitted in 11 cassettes.  MAD    NOTE:  Ischemia time: 8/14/24 at 1153.  This specimen was placed into formalin at: 8/14/24 at 1230.     Summary of Cassettes:  Specimen Label Site  B  1-2 Slice 1, most superior end, perpendicular    3 Slice 5, bisected    4 Slice 8, bisected    5 Slice 9 with lesion, biopsy cavity, and closest posterior margin, bisected    6 Slice 10 with lesion and biopsy cavity, bisected    7 Slice 11 with lesion, biopsy cavity (spiral clip), and closest anterior, superior, lateral and medial margins, bisected    8 Slice 12 with lesion, biopsy cavity and Magseed, bisected    9 Slice 13, bisected    10 Slice 18, " "bisected    11 Slice 22, most inferior end, perpendicular    [facility]  [address] [place] [address]  Phone: [phone] Fax: [phone]  C:  Received in formalin, in a Dubin device, labeled with the patient's name and hospital number and \"#1 sentinel lymph node *\", is a possible lymph node with attached adipose tissue measuring 4.8 x 3.3 x 0.9 cm.  The lymph node is serially sectioned to reveal a gel-filled hemorrhagic biopsy cavity with an open coil clip, measuring 1.0 x 0.6 x 0.4 cm.  Within the adipose tissue, a Magseed is identified.  The specimen is entirely submitted in 6 cassettes.  [facility]  [address] [place] [address]  Phone: [phone] Fax: [phone]       Disclaimer       One or more of the reagents used to perform assays on this specimen MAY have contained components considered to be analyte specific reagents (ASR's).  ASR's have not been cleared or approved by the U.S. Food and Drug Administration.  These assays were developed and their performance characteristics determined by the Department of Pathology at [facility]. The FDA does not require this test to go through premarket FDA review. This test is used for clinical purposes. It should not be regarded as investigational or for research. This laboratory is certified under the Clinical Laboratory Improvement Amendments (CLIA) as qualified to perform high complexity clinical laboratory testing.  The assays were performed with appropriate positive and negative controls which stained appropriately.         2:05 PM      The patient was called with the pathology results.  A message was left on voicemail.  We will discuss the results at her postoperative visit.  She is to call if she has questions prior to that.   "

## 2024-08-29 ENCOUNTER — APPOINTMENT (OUTPATIENT)
Dept: HEMATOLOGY/ONCOLOGY | Facility: HOSPITAL | Age: 74
End: 2024-08-29
Payer: MEDICARE

## 2024-08-29 NOTE — TUMOR BOARD NOTE
MULTIDISCIPLINARY BREAST CANCER TUMOR BOARD CONFERENCE NOTE  Dina Mullins was presented at Breast Cancer Tumor Board Conference  Conference date: 8/29/2024  Presenting Provider(s): Dr. Mabel Crenshaw  Present at Conference: Medical Oncology, Radiation Oncology, Surgical Oncology, Radiology, and Pathology Representatives  Conference Review Type: Pathology Review    National Guidelines discussed: Yes    Surgical Resection: Surgery is complete s/p lumpectomy x2, SLN.  Radiation therapy: Indicated   Genomic Testing: no    Systemic therapy: Consider anthracycline-based chemotherapy for the pT2 likely metaplastic triple negative tumor. Consider endocrine therapy after radiation for the ER+ tumor.  Clinical Trial Eligible: no   Genetics: Meets NCCN criteria      Referral Recommendations:  Genetics, Radiation Oncology, and Medical Oncology    Cancer Staging:   Cancer Staging   Malignant neoplasm of overlapping sites of right female breast (Multi)  Staging form: Breast, AJCC 8th Edition  - Clinical stage from 7/1/2024: Stage IB (cT1, cN0(f), cM0, G3, ER-, OK-, HER2-) - Unsigned  - Clinical stage from 7/1/2024: Stage IA (cT1, cN0(f), cM0, G1, ER+, OK+, HER2-) - Unsigned  - Pathologic stage from 8/14/2024: Stage IIIA (pT2(2), pN1mi(sn), cM0, G3, ER-, OK-, HER2-) - Signed by Kacie Simms MD on 8/29/2024       Disclaimer  SCC tumor board recommendations represent the consensus opinion of physicians present at a weekly patient care conference. The treating SCC physician is not always present, and many of the physicians formulating the recommendation have not personally seen or examined the patient under discussion. It is understood that the treating SCC physician considers the expertise of the Tumor Board Recommendation in formulating his/her plan for the patient. However, in many situations, based on individualized patient considerations, a different plan is determined by the treating physician to be the optimal medical  management.    Scribe Attestation  By signing my name below, I, Vilma Lozano   attest that this documentation has been prepared under the direction and in the presence of BREAST TUMOR BOARD.

## 2024-08-30 NOTE — PROGRESS NOTES
Subjective   Dina Mullins is a 73 y.o. female here for a postoperative visit.  She had a right lumpectomy x 2 and right axillary sentinel lymph node biopsy on 8/14/2024.  She is doing well following surgery and has no complaints or concerns.  She has had some fullness under her right arm.  No pain or drainage.    Findings at that time were the following:   Right breast 11:00   Invasive poorly differentiated carcinoma (metaplastic)  Tumor size: 2.4 cm ; clear margins  Tumor thgthrthathdtheth:th th4th Estrogen Receptor: Negative  Progesterone Receptor: Negative  Her-2 leona: Negative  Lymph node status: 0/2     Right breast 12:00  Invasive ductal carcinoma  Tumor size: 1.6 cm ; clear margins  Tumor ndgndrndanddndend:nd nd2nd Estrogen Receptor: > 95%  Progesterone Receptor: 85%  Her-2 leona: Negative  Lymph node status: 1mi/2    Objective     Physical Exam  Chest:          Comments: Both incisions are healing well with no evidence of infection or hematoma.  Small soft seroma in the right axilla.  Nontender.  Patient declining aspiration.         Alert and oriented.      Assessment/Plan   Stage IIIA multifocal right breast cancer diagnosed in July 2024.   -[id number]     Right breast 11:00   Invasive poorly differentiated carcinoma (metaplastic), ER negative, WI negative, HER2 negative    [id number]   Right breast 12:00  Invasive ductal carcinoma, grade 1; ER> 95%, WI 85%, HER2 negative  -[id number]      She is doing well following surgery.  She may resume all activities without restrictions.    A copy of the pathology report was given to the patient and reviewed in detail with the patient.     Recommendations from interdisciplinary breast tumor conference were reviewed with the patient.    You will need to schedule an appointment with a medical oncologist to discuss hormonal therapy (estrogen blocking pill) and additional treatment-possible chemotherapy.  You will need to schedule an appointment with a radiation oncologist for radiation therapy.  You  will need to meet with a genetics counselor for genetic testing.    Follow-up with me in April 2025 with a bilateral mammogram.  Mabel Crenshaw MD

## 2024-09-03 ENCOUNTER — OFFICE VISIT (OUTPATIENT)
Dept: SURGICAL ONCOLOGY | Facility: CLINIC | Age: 74
End: 2024-09-03
Payer: MEDICARE

## 2024-09-03 VITALS
HEIGHT: 62 IN | TEMPERATURE: 97.9 F | RESPIRATION RATE: 18 BRPM | HEART RATE: 73 BPM | SYSTOLIC BLOOD PRESSURE: 140 MMHG | BODY MASS INDEX: 49.23 KG/M2 | DIASTOLIC BLOOD PRESSURE: 82 MMHG | WEIGHT: 267.5 LBS

## 2024-09-03 DIAGNOSIS — C50.811 MALIGNANT NEOPLASM OF OVERLAPPING SITES OF RIGHT FEMALE BREAST, UNSPECIFIED ESTROGEN RECEPTOR STATUS (MULTI): ICD-10-CM

## 2024-09-03 PROCEDURE — 3051F HG A1C>EQUAL 7.0%<8.0%: CPT | Performed by: SURGERY

## 2024-09-03 PROCEDURE — 3077F SYST BP >= 140 MM HG: CPT | Performed by: SURGERY

## 2024-09-03 PROCEDURE — 1159F MED LIST DOCD IN RCRD: CPT | Performed by: SURGERY

## 2024-09-03 PROCEDURE — 1157F ADVNC CARE PLAN IN RCRD: CPT | Performed by: SURGERY

## 2024-09-03 PROCEDURE — 3079F DIAST BP 80-89 MM HG: CPT | Performed by: SURGERY

## 2024-09-03 PROCEDURE — 99211 OFF/OP EST MAY X REQ PHY/QHP: CPT | Performed by: SURGERY

## 2024-09-03 PROCEDURE — 3008F BODY MASS INDEX DOCD: CPT | Performed by: SURGERY

## 2024-09-03 PROCEDURE — 1160F RVW MEDS BY RX/DR IN RCRD: CPT | Performed by: SURGERY

## 2024-09-03 PROCEDURE — 1126F AMNT PAIN NOTED NONE PRSNT: CPT | Performed by: SURGERY

## 2024-09-03 PROCEDURE — 1036F TOBACCO NON-USER: CPT | Performed by: SURGERY

## 2024-09-03 PROCEDURE — 4010F ACE/ARB THERAPY RXD/TAKEN: CPT | Performed by: SURGERY

## 2024-09-03 ASSESSMENT — ENCOUNTER SYMPTOMS
OCCASIONAL FEELINGS OF UNSTEADINESS: 0
LOSS OF SENSATION IN FEET: 0
DEPRESSION: 0

## 2024-09-03 ASSESSMENT — COLUMBIA-SUICIDE SEVERITY RATING SCALE - C-SSRS
1. IN THE PAST MONTH, HAVE YOU WISHED YOU WERE DEAD OR WISHED YOU COULD GO TO SLEEP AND NOT WAKE UP?: NO
6. HAVE YOU EVER DONE ANYTHING, STARTED TO DO ANYTHING, OR PREPARED TO DO ANYTHING TO END YOUR LIFE?: NO
2. HAVE YOU ACTUALLY HAD ANY THOUGHTS OF KILLING YOURSELF?: NO

## 2024-09-03 ASSESSMENT — PAIN SCALES - GENERAL: PAINLEVEL: 0-NO PAIN

## 2024-09-03 NOTE — PATIENT INSTRUCTIONS
Follow-up with  in April 2025 w/ bilateral mammogram.   Referrals for medical oncology, radiation oncology, and genetics have been placed for you- scheduling dept. will call you to make these appointments.

## 2024-09-09 ENCOUNTER — HOSPITAL ENCOUNTER (OUTPATIENT)
Dept: RADIATION ONCOLOGY | Facility: CLINIC | Age: 74
Setting detail: RADIATION/ONCOLOGY SERIES
Discharge: HOME | End: 2024-09-09
Payer: MEDICARE

## 2024-09-09 VITALS
TEMPERATURE: 97.9 F | WEIGHT: 269.51 LBS | HEART RATE: 66 BPM | OXYGEN SATURATION: 98 % | DIASTOLIC BLOOD PRESSURE: 53 MMHG | BODY MASS INDEX: 48.97 KG/M2 | SYSTOLIC BLOOD PRESSURE: 141 MMHG | RESPIRATION RATE: 18 BRPM

## 2024-09-09 DIAGNOSIS — C50.811 MALIGNANT NEOPLASM OF OVERLAPPING SITES OF RIGHT FEMALE BREAST, UNSPECIFIED ESTROGEN RECEPTOR STATUS (MULTI): Primary | ICD-10-CM

## 2024-09-09 DIAGNOSIS — N95.0 POST-MENOPAUSAL BLEEDING: ICD-10-CM

## 2024-09-09 PROCEDURE — 99205 OFFICE O/P NEW HI 60 MIN: CPT | Performed by: STUDENT IN AN ORGANIZED HEALTH CARE EDUCATION/TRAINING PROGRAM

## 2024-09-09 PROCEDURE — 99215 OFFICE O/P EST HI 40 MIN: CPT | Performed by: STUDENT IN AN ORGANIZED HEALTH CARE EDUCATION/TRAINING PROGRAM

## 2024-09-09 SDOH — ECONOMIC STABILITY: INCOME INSECURITY: IN THE LAST 12 MONTHS, WAS THERE A TIME WHEN YOU WERE NOT ABLE TO PAY THE MORTGAGE OR RENT ON TIME?: NO

## 2024-09-09 SDOH — ECONOMIC STABILITY: FOOD INSECURITY: WITHIN THE PAST 12 MONTHS, THE FOOD YOU BOUGHT JUST DIDN'T LAST AND YOU DIDN'T HAVE MONEY TO GET MORE.: NEVER TRUE

## 2024-09-09 SDOH — ECONOMIC STABILITY: FOOD INSECURITY: WITHIN THE PAST 12 MONTHS, YOU WORRIED THAT YOUR FOOD WOULD RUN OUT BEFORE YOU GOT MONEY TO BUY MORE.: NEVER TRUE

## 2024-09-09 SDOH — ECONOMIC STABILITY: TRANSPORTATION INSECURITY
IN THE PAST 12 MONTHS, HAS LACK OF TRANSPORTATION KEPT YOU FROM MEETINGS, WORK, OR FROM GETTING THINGS NEEDED FOR DAILY LIVING?: NO

## 2024-09-09 SDOH — ECONOMIC STABILITY: TRANSPORTATION INSECURITY
IN THE PAST 12 MONTHS, HAS THE LACK OF TRANSPORTATION KEPT YOU FROM MEDICAL APPOINTMENTS OR FROM GETTING MEDICATIONS?: NO

## 2024-09-09 ASSESSMENT — SOCIAL DETERMINANTS OF HEALTH (SDOH)
WITHIN THE LAST YEAR, HAVE YOU BEEN KICKED, HIT, SLAPPED, OR OTHERWISE PHYSICALLY HURT BY YOUR PARTNER OR EX-PARTNER?: NO
WITHIN THE LAST YEAR, HAVE TO BEEN RAPED OR FORCED TO HAVE ANY KIND OF SEXUAL ACTIVITY BY YOUR PARTNER OR EX-PARTNER?: NO
WITHIN THE LAST YEAR, HAVE YOU BEEN AFRAID OF YOUR PARTNER OR EX-PARTNER?: NO
WITHIN THE LAST YEAR, HAVE YOU BEEN HUMILIATED OR EMOTIONALLY ABUSED IN OTHER WAYS BY YOUR PARTNER OR EX-PARTNER?: NO
HOW HARD IS IT FOR YOU TO PAY FOR THE VERY BASICS LIKE FOOD, HOUSING, MEDICAL CARE, AND HEATING?: NOT VERY HARD

## 2024-09-09 ASSESSMENT — ENCOUNTER SYMPTOMS
OCCASIONAL FEELINGS OF UNSTEADINESS: 1
SHORTNESS OF BREATH: 0
LOSS OF SENSATION IN FEET: 0
DEPRESSION: 0
CHILLS: 0
FEVER: 0

## 2024-09-09 ASSESSMENT — PAIN SCALES - GENERAL: PAINLEVEL: 0-NO PAIN

## 2024-09-09 NOTE — PROGRESS NOTES
Patient is in today for consult visit with Dr Buitrago. Her surgery was 8/14. Today the patient denies pain, numbness, and N/V/F. The patient reports full ROM with some left arm soreness and fatigue. Her history includes a. Fib, HTN, diabetes, vertigo, and parkinsons. She will see Emperatriz 9/27 and Eyal 10/8. Patient taken to scheduling desk and will return to clinic in 3 weeks. Verbalized understanding.

## 2024-09-09 NOTE — PROGRESS NOTES
Radiation Oncology Nursing Note    Prior Radiotherapy:  No  No radiation treatments to show. (Treatments may have been administered in another system.)     Current Systemic Treatment:  No     Presence of Pacemaker or ICD:  No    History of Autoimmune or Connective Tissue Disorders:  No    Pain: The patient's current pain level was assessed.  They report currently having a pain of 0 out of 10.  They feel their pain is under control without the use of pain medications.    Review of Systems:  Review of Systems - Oncology

## 2024-09-09 NOTE — PROGRESS NOTES
Radiation Oncology Outpatient Consult    Patient Name:  Dina Mullins  MRN:  [id number]  :  1950    Referring Provider: Mabel Crenshaw MD  Primary Care Provider: Ken Saleh PA-C  Care Team: Patient Care Team:  Ken Saleh PA-C as PCP - General (Internal Medicine)  Thelma Boudreaux, APRN-CNP as PCP - [employer] PCP    Date of Service: 2024     SUBJECTIVE  History of Present Illness:  Dina Mullins is a 73 y.o. female who was referred by Mabel Crenshaw MD, for a consultation to the [facility] Department of Radiation Oncology.  She is presenting for evaluation and management of node positive, right breast cancer.     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  All margins negative for invasive carcinoma with 1.8 mm from the anterior/medial margin, DCIS is less than 1 mm from the anterior/medial margin, status post partial mastectomy and sentinel lymph node biopsy  #) Right breast, invasive ductal carcinoma at 12:00, pT1c pN1mi (sn), ER positive greater than 95%, [place] +85%, HER2 equivocal (2+)/dual-kanika negative (1.4), 1.4 cm, G1, indeterminate LVI, all margins negative for invasive carcinoma with less than 1 mm from the posterior margin and 1.6 mm from the anterior margin and 2.1 mm from the medial margin, status post partial mastectomy and sentinel lymph node biopsy    Ms. Burgos is a postmenopausal female with a prior history of a normal mammogram in  that presented on 2024 with a bilateral screening mammogram with tomosynthesis showing a lesion in the central right breast at middle depth.  The patient denies any palpable breast mass, nipple discharge or breast skin changes.  The patient was evaluated further with  right diagnostic mammogram with tomosynthesis on 2024 which revealed at middle depth in the right breast at 12:00, 9 cm from the nipple a 1 cm spiculated mass.  On ultrasound at 12:00, 9-1/2 cm from the nipple measured a 0.8 x 0.8 x 1.1 cm mass with a lymph node in level 1 measuring 0.8 x 1.1 x 1.8 cm with cortical thickening up to 4 mm.  The patient underwent ultrasound-guided core biopsy on 2024 to an additional lesion seen at 11:00, 8 cm from the nipple which showed invasive ductal carcinoma, grade 3, ER negative, SD negative, HER2 negative (1+), originally seen right breast 12:00, 9 cm from the nipple lesion showed invasive ductal carcinoma, grade 1 with microcalcifications, ER positive greater than 95%, [place] +85%, HER2 equivocal (2+)/dual-kanika negative (1.4) and right axillary lymph node biopsy was negative for carcinoma showing reactive changes.      The patient underwent right breast lumpectomy at 11:00 and 12:00 as well as right sentinel lymph node biopsy on 2024.  Pathology revealed in the 11:00 lumpectomy cavity, poorly differentiated carcinoma with squamous differential (metaplastic carcinoma) measuring 2.4 cm, grade 3, with ductal carcinoma in situ with solid subtype, grade 3 and all margins negative for invasive carcinoma and DCIS less than 1 mm from the anterior/medial margin.  Pathology revealed in the 12:00 lumpectomy specimen invasive ductal carcinoma measuring 1.4 cm, grade 1, indeterminate LVI, all margins negative for invasive carcinoma with less than 1 mm from the posterior margin and 1.6 mm from the anterior margin.  [place] lymph node biopsy revealed 1/2 lymph nodes involved by micrometastatic low-grade carcinoma consistent with the hormone positive, low-grade primary.    The patient presents today for discussion of adjuvant radiation therapy options.  The patient is scheduled to see medical oncology next month.      Onset of menses - 11  Onset of menopause -  64  Post-menopausal bleeding - Intermittent bleeding  OCP use:   Estrogen replacement: Denies    Prior Radiotherapy:  No radiation treatments to show. (Treatments may have been administered in another system.)       Past Medical History:    Past Medical History:   Diagnosis Date    Anemia     Arthritis     Atrial fib/flutter, transient (Multi)     Bilateral tinnitus     Chronic kidney failure     stage three    DM (diabetes mellitus) (Multi)     Hypertension     Left arm pain     Sepsis (Multi)     Septic shock (Multi)     Stomach ulcer     Vertigo         Past Surgical History:  No past surgical history on file.     Family History:  Cancer-related family history includes Breast cancer (age of onset: 69) in her mother; Cancer in her mother.    Social History:    Social History     Tobacco Use    Smoking status: Never     Passive exposure: Never    Smokeless tobacco: Never   Vaping Use    Vaping status: Never Used   Substance Use Topics    Alcohol use: Never    Drug use: Never       Allergies:    Allergies   Allergen Reactions    Codeine Nausea/vomiting        Medications:    Current Outpatient Medications:     acetaminophen (TylenoL) 325 mg tablet, Take 2 tablets (650 mg) by mouth every 4 hours if needed for moderate pain (4 - 6) or mild pain (1 - 3)., Disp: , Rfl:     atorvastatin (Lipitor) 40 mg tablet, Take 1 tablet (40 mg) by mouth once daily at bedtime., Disp: 90 tablet, Rfl: 1    carbidopa-levodopa (Sinemet)  mg tablet, Take 1 tablet by mouth 3 times a day., Disp: 90 tablet, Rfl: 3    diphenhydrAMINE-acetaminophen (Tylenol PM)  mg per tablet, Take 1 tablet by mouth as needed at bedtime for sleep., Disp: , Rfl:     DULoxetine (Cymbalta) 20 mg DR capsule, Take 1 capsule (20 mg) by mouth once daily., Disp: 90 capsule, Rfl: 1    fluticasone (Flonase) 50 mcg/actuation nasal spray, Administer 1 spray into each nostril once daily as needed for allergies or rhinitis. (Patient not taking: Reported on  "8/14/2024), Disp: 16 g, Rfl: 1    furosemide (Lasix) 40 mg tablet, Take 1 tablet (40 mg) by mouth once daily. (Patient not taking: Reported on 8/14/2024), Disp: 90 tablet, Rfl: 1    Lantus Solostar U-100 Insulin 100 unit/mL (3 mL) pen, Inject 25 Units under the skin once daily in the morning. (Patient taking differently: Inject 25 Units under the skin once daily at bedtime.), Disp: 9 mL, Rfl: 2    lisinopril 10 mg tablet, Take 1 tablet (10 mg) by mouth once daily., Disp: 90 tablet, Rfl: 1    meclizine (Antivert) 25 mg tablet, Take 1 tablet (25 mg) by mouth 3 times a day as needed for dizziness for up to 20 doses., Disp: 20 tablet, Rfl: 0    meclizine (Antivert) 25 mg tablet, Take 1 tablet (25 mg) by mouth 3 times a day as needed for dizziness., Disp: 90 tablet, Rfl: 2    methocarbamol (Robaxin) 750 mg tablet, Take 1 tablet (750 mg) by mouth 3 times a day., Disp: 90 tablet, Rfl: 0    OneTouch Verio test strips strip, 1 strip by in vitro route 4 times a day. And as needed, Disp: 300 strip, Rfl: 3    pen needle, diabetic 32 gauge x 5/32\" needle, USE AS DIRECTED, Disp: 100 each, Rfl: 3    rivaroxaban (Xarelto) 20 mg tablet, Take 1 tablet (20 mg) by mouth once daily in the evening. Take with meals., Disp: 90 tablet, Rfl: 3    traMADol (Ultram) 50 mg tablet, Take 1 tablet (50 mg) by mouth every 6 hours if needed for severe pain (7 - 10)., Disp: 12 tablet, Rfl: 0      Review of Systems:  Review of Systems   Constitutional:  Negative for chills and fever.   Respiratory:  Negative for shortness of breath.    Cardiovascular:  Negative for chest pain.   Genitourinary:  Positive for vaginal bleeding.        Performance Status:  The Karnofsky performance scale today is 80, Normal activity with effort; some signs or symptoms of disease (ECOG equivalent 1).        OBJECTIVE  /53 (BP Location: Left arm, Patient Position: Sitting, BP Cuff Size: Adult long)   Pulse 66   Temp 36.6 °C (97.9 °F) (Temporal)   Resp 18   Wt 122 " kg (269 lb 8.2 oz)   SpO2 98%   BMI 48.97 kg/m²    Physical Exam  Vitals and nursing note reviewed.   HENT:      Head: Normocephalic.   Eyes:      Extraocular Movements: Extraocular movements intact.   Pulmonary:      Effort: Pulmonary effort is normal.   Chest:      Comments: Exam deferred to scheduled follow-up  Neurological:      General: No focal deficit present.      Mental Status: She is alert.          Laboratory Review:  There are no laboratory contraindications to radiation therapy.    The pertinent lab results were reviewed and discussed with the patient.  Notably,     None    Pathology Review:  The pertinent pathology results were reviewed and discussed with the patient.  Notably,     8/14/2024-A. Right breast lumpectomy at 11 o'clock:-- Invasive poorly differentiated carcinoma with squamous differentiation (metaplastic carcinoma) and ductal carcinoma in situ, 2.4 cm, G3 with DCIS of solid subtype, G3, indeterminant lymphovascular invasion.  All margins negative for invasive carcinoma with 1.8 mm from the anterior/medial margin, DCIS is less than 1 mm from the anterior/medial margin.  -- Changes consistent with site of previous biopsy.    pT2 pN0 (sn)     B. Right breast lumpectomy at 12 o'clock: -- Invasive ductal carcinoma, 1.4 cm, G1, indeterminate LVI, all margins negative for invasive carcinoma with less than 1 mm from the posterior margin and 1.6 mm from the anterior margin and 2.1 mm from the medial margin.  -- Changes consistent with site of previous biopsy.     pT1c pN1mi (sn)     C. Right breast sentinel lymph node with magseed, excision: -- One of two lymph nodes involved by micrometastasis, from low-grade carcinoma.   -- Changes consistent with site of previous biopsy.      Note: The largest contiguous focus of tumor involving the lymph node measures 1.6 mm consistent with micrometastasis. Immunohistochemical stains for cytokeratin AE1/3 confirm the presence of tumor in the lymph node. The  tumor morphologically appears lower grade and is estrogen and progesterone receptor strongly and diffusely positive consistent with a metastasis of the lower grade tumor at 12 o'clock. No extranodal extension is identified.       7/1/2024-A. Right breast mass, ultrasound-guided core biopsy at 11 o'clock, 8 cm from nipple:--  Invasive ductal carcinoma, grade 3 with necrosis, Note:  In this limited sample, the invasive carcinoma measures up to 0.8 cm in greatest dimension.      B. Right breast mass, ultrasound-guided core biopsy at 12 o'clock, 9 cm from nipple:  -- Invasive ductal carcinoma, grade 1 with associated microcalcifications, Note:  In this limited sample, the invasive carcinoma measures up to 9 mm in greatest dimension.     C. Right axillary lymph node, ultrasound-guided biopsy:  -- Portion of lymph node with reactive changes; negative for carcinoma, Note: Immunohistochemical stain for cytokeratin AE1/3 is negative.       Disease Associated Genomics:  Right breast 11:00: ER negative, UT negative, HER2 negative (1+)  Right breast 12:00: ER positive greater than 95%, UT +85%, HER2 equivocal (2+)/dual-kanika negative (1.4)     Imaging:  The pertinent imaging results were reviewed and discussed with the patient.  Notably,     4/17/2024-bilateral screening mammogram with tomosynthesis:    12/20/2021-bilateral mammogram screening with tomosynthesis: No significant masses, calcifications or other findings seen in either breast.  (BI-RADS 1)       Prior Systemic Therapies:  None    Prior Surgeries:  8/14/2024-right partial mastectomy x 2 and sentinel lymph node biopsy    Prior Radiation Therapy:  None    ASSESSMENT:   Dina Mullins is a 73 y.o. female with Malignant neoplasm of overlapping sites of right female breast (Multi), Clinical: Stage IB (cT1, cN0(f), cM0, G3, ER-, UT-, HER2-)  Malignant neoplasm of overlapping sites of right female breast (Multi), Clinical: Stage IA (cT1, cN0(f), cM0, G1, ER+, UT+,  HER2-)  Malignant neoplasm of overlapping sites of right female breast (Multi), Pathologic: Stage IIIA (pT2(2), pN1mi(sn), cM0, G3, ER-, SC-, HER2-).      Ms. Mullins is a postmenopausal female with 2 primary malignant breast cancers in the right breast, including a early stage, grade 3, poorly differentiated squamous differentiated metaplastic carcinoma, triple negative, and invasive ductal carcinoma, grade 1, ER greater than 95%, SC 85%, HER2 negative, node positive breast cancer.    I discussed with the patient regarding her clinical presentation, imaging, pathology and treatment recommendations regarding each of her individual cancers.  I discussed with the patient given her triple negative breast cancer greater than 2 cm that she is likely going to discuss chemotherapy with her medical oncologist which will be completed prior to initiation of radiation therapy.  I discussed with the patient regarding hormonal blocking therapy for which she is likely to take given her node positive, ER positive, HER2 negative breast cancer.  I discussed with the patient regarding her 2 primary breast cancers and how she is not a clinical trial candidate for [id number]/MA.39.    I discussed with the patient the benefit of treatment with radiation therapy to the whole breast for decrease in ipsilateral breast tumor recurrence.  I discussed with the patient regarding her indications for addition of regional [place] irradiation including the undissected lymph nodes in the axilla, internal mammary lymph nodes and supraclavicular lymph nodes for the distant metastatic free survival and local regional improvement.  I discussed with the patient her risk of additional nonsentinel lymph node involvement given her micrometastatic disease, for which ranges between approximately 10-20%.  I discussed with the patient regarding the benefits and risks of treatment of undissected axillary lymph nodes alone versus comprehensive regional chant[place]  irradiation.  I discussed with the patient regarding the acute and long-term effects of radiation therapy to the right breast including the addition of comprehensive regional [place] radiation therapy.    The patient will be scheduled for follow-up to rediscuss radiation therapy or follow-up will be rescheduled after completion of chemotherapy if the patient is a candidate, consents and proceeds with chemotherapy.    The patient states that she has intermittent vaginal bleeding after menopause.  The patient will be referred for transabdominal/transvaginal ultrasound of the uterus, referral to gynecological oncology for examination and possible endometrial biopsy.  I will reach out to medical oncology to assist with expediting the patient's evaluation to this month given her potential candidacy for chemotherapy.    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  All margins negative for invasive carcinoma with 1.8 mm from the anterior/medial margin, DCIS is less than 1 mm from the anterior/medial margin, status post partial mastectomy and sentinel lymph node biopsy  -The patient will see medical oncology to discuss adjuvant chemotherapy which would be completed prior to initiation of radiation therapy  -Follow-up in 3 weeks, if undergoing chemotherapy may cancel follow-up and be reevaluated prior to initiation of radiation therapy at the completion of chemotherapy versus if not a chemotherapy candidate or declines chemotherapy will undergo rediscussion of radiation therapy options to determine whole breast radiation therapy with high tangents or comprehensive regional [place] irradiation  -Status post partial mastectomy and sentinel lymph node biopsy    #) Right breast, invasive ductal carcinoma  at 12:00, pT1c pN1mi (sn), ER positive greater than 95%, [place] +85%, HER2 equivocal (2+)/dual-kanika negative (1.4), 1.4 cm, G1, indeterminate LVI, all margins negative for invasive carcinoma with less than 1 mm from the posterior margin and 1.6 mm from the anterior margin and 2.1 mm from the medial margin, status post partial mastectomy and sentinel lymph node biopsy  -The patient to see medical oncology to discuss adjuvant hormonal blocking therapy  -Oncotype Dx score likely unneeded given the chemotherapy benefit from triple negative breast cancer primary  -Not a candidate for BMIC7100/MA.39 due to second primary breast malignancy  -Status post partial mastectomy and sentinel lymph node biopsy    #) Multiple primary breast malignancies  -Patient referred to genetics by breast surgery    #) Abnormal uterine bleeding of postmenopausal female  -Referred for transvaginal/transabdominal ultrasound  -Referral to gynecological oncology for examination and possibly endometrial biopsy    A total of 50 minutes were spent face-to-face with the patient, the majority of time spent detailing treatment options with an additional 10 minutes spent reviewing records including imaging, pathology and physician notes.    Bib Buitrago MD  [facility] [place]  [facility]  - Department of Radiation Oncology  Phone: [phone]  Fax: [phone]  Epic secure chat preferred / Pager [phone]    Note: This was transcribed using Dragon voice recognition software. Attempts were made to correct any errors; however, errors or omissions may be present.     NCCN Guidelines were applicable to guide this patients treatment plan.

## 2024-09-10 DIAGNOSIS — E11.69 TYPE 2 DIABETES MELLITUS WITH OBESITY (MULTI): ICD-10-CM

## 2024-09-10 DIAGNOSIS — E66.9 TYPE 2 DIABETES MELLITUS WITH OBESITY (MULTI): ICD-10-CM

## 2024-09-10 RX ORDER — INSULIN GLARGINE 100 [IU]/ML
25 INJECTION, SOLUTION SUBCUTANEOUS EVERY MORNING
Qty: 9 ML | Refills: 0 | Status: SHIPPED | OUTPATIENT
Start: 2024-09-10

## 2024-09-10 RX ORDER — BLOOD-GLUCOSE METER
1 EACH MISCELLANEOUS 4 TIMES DAILY
Qty: 300 STRIP | Refills: 3 | Status: SHIPPED | OUTPATIENT
Start: 2024-09-10

## 2024-09-13 ENCOUNTER — HOSPITAL ENCOUNTER (OUTPATIENT)
Dept: RADIOLOGY | Facility: CLINIC | Age: 74
Discharge: HOME | End: 2024-09-13
Payer: MEDICARE

## 2024-09-13 ENCOUNTER — LAB (OUTPATIENT)
Dept: LAB | Facility: LAB | Age: 74
End: 2024-09-13
Payer: MEDICARE

## 2024-09-13 DIAGNOSIS — E11.69 TYPE 2 DIABETES MELLITUS WITH OBESITY (MULTI): ICD-10-CM

## 2024-09-13 DIAGNOSIS — E66.9 TYPE 2 DIABETES MELLITUS WITH OBESITY (MULTI): ICD-10-CM

## 2024-09-13 DIAGNOSIS — N95.0 POST-MENOPAUSAL BLEEDING: ICD-10-CM

## 2024-09-13 LAB
ALBUMIN SERPL BCP-MCNC: 4.1 G/DL (ref 3.4–5)
ALP SERPL-CCNC: 55 U/L (ref 33–136)
ALT SERPL W P-5'-P-CCNC: 7 U/L (ref 7–45)
ANION GAP SERPL CALC-SCNC: 16 MMOL/L (ref 10–20)
AST SERPL W P-5'-P-CCNC: 11 U/L (ref 9–39)
BASOPHILS # BLD AUTO: 0.03 X10*3/UL (ref 0–0.1)
BASOPHILS NFR BLD AUTO: 0.5 %
BILIRUB SERPL-MCNC: 0.9 MG/DL (ref 0–1.2)
BUN SERPL-MCNC: 26 MG/DL (ref 6–23)
CALCIUM SERPL-MCNC: 9 MG/DL (ref 8.6–10.6)
CHLORIDE SERPL-SCNC: 105 MMOL/L (ref 98–107)
CHOLEST SERPL-MCNC: 151 MG/DL (ref 0–199)
CHOLESTEROL/HDL RATIO: 2.1
CO2 SERPL-SCNC: 23 MMOL/L (ref 21–32)
CREAT SERPL-MCNC: 1.42 MG/DL (ref 0.5–1.05)
EGFRCR SERPLBLD CKD-EPI 2021: 39 ML/MIN/1.73M*2
EOSINOPHIL # BLD AUTO: 0.15 X10*3/UL (ref 0–0.4)
EOSINOPHIL NFR BLD AUTO: 2.4 %
ERYTHROCYTE [DISTWIDTH] IN BLOOD BY AUTOMATED COUNT: 12.3 % (ref 11.5–14.5)
GLUCOSE SERPL-MCNC: 143 MG/DL (ref 74–99)
HCT VFR BLD AUTO: 35.3 % (ref 36–46)
HDLC SERPL-MCNC: 73.4 MG/DL
HGB BLD-MCNC: 11.2 G/DL (ref 12–16)
IMM GRANULOCYTES # BLD AUTO: 0.02 X10*3/UL (ref 0–0.5)
IMM GRANULOCYTES NFR BLD AUTO: 0.3 % (ref 0–0.9)
LDLC SERPL CALC-MCNC: 64 MG/DL
LYMPHOCYTES # BLD AUTO: 1.4 X10*3/UL (ref 0.8–3)
LYMPHOCYTES NFR BLD AUTO: 22.2 %
MCH RBC QN AUTO: 29.1 PG (ref 26–34)
MCHC RBC AUTO-ENTMCNC: 31.7 G/DL (ref 32–36)
MCV RBC AUTO: 92 FL (ref 80–100)
MONOCYTES # BLD AUTO: 0.41 X10*3/UL (ref 0.05–0.8)
MONOCYTES NFR BLD AUTO: 6.5 %
NEUTROPHILS # BLD AUTO: 4.3 X10*3/UL (ref 1.6–5.5)
NEUTROPHILS NFR BLD AUTO: 68.1 %
NON HDL CHOLESTEROL: 78 MG/DL (ref 0–149)
NRBC BLD-RTO: 0 /100 WBCS (ref 0–0)
PLATELET # BLD AUTO: 243 X10*3/UL (ref 150–450)
POTASSIUM SERPL-SCNC: 4.6 MMOL/L (ref 3.5–5.3)
PROT SERPL-MCNC: 6.5 G/DL (ref 6.4–8.2)
RBC # BLD AUTO: 3.85 X10*6/UL (ref 4–5.2)
SODIUM SERPL-SCNC: 139 MMOL/L (ref 136–145)
TRIGL SERPL-MCNC: 67 MG/DL (ref 0–149)
VLDL: 13 MG/DL (ref 0–40)
WBC # BLD AUTO: 6.3 X10*3/UL (ref 4.4–11.3)

## 2024-09-13 PROCEDURE — 76856 US EXAM PELVIC COMPLETE: CPT

## 2024-09-13 PROCEDURE — 80053 COMPREHEN METABOLIC PANEL: CPT

## 2024-09-13 PROCEDURE — 80061 LIPID PANEL: CPT

## 2024-09-13 PROCEDURE — 36415 COLL VENOUS BLD VENIPUNCTURE: CPT

## 2024-09-13 PROCEDURE — 85025 COMPLETE CBC W/AUTO DIFF WBC: CPT

## 2024-09-18 ENCOUNTER — PATIENT OUTREACH (OUTPATIENT)
Dept: HEMATOLOGY/ONCOLOGY | Facility: CLINIC | Age: 74
End: 2024-09-18

## 2024-09-18 ENCOUNTER — TELEPHONE (OUTPATIENT)
Dept: CARDIOLOGY | Facility: CLINIC | Age: 74
End: 2024-09-18
Payer: MEDICARE

## 2024-09-18 ENCOUNTER — OFFICE VISIT (OUTPATIENT)
Dept: HEMATOLOGY/ONCOLOGY | Facility: CLINIC | Age: 74
End: 2024-09-18
Payer: MEDICARE

## 2024-09-18 VITALS
OXYGEN SATURATION: 97 % | DIASTOLIC BLOOD PRESSURE: 59 MMHG | TEMPERATURE: 99.3 F | WEIGHT: 267.75 LBS | RESPIRATION RATE: 18 BRPM | BODY MASS INDEX: 48.65 KG/M2 | SYSTOLIC BLOOD PRESSURE: 126 MMHG | HEART RATE: 72 BPM

## 2024-09-18 DIAGNOSIS — I48.11 LONGSTANDING PERSISTENT ATRIAL FIBRILLATION (MULTI): ICD-10-CM

## 2024-09-18 DIAGNOSIS — C50.811 MALIGNANT NEOPLASM OF OVERLAPPING SITES OF RIGHT FEMALE BREAST, UNSPECIFIED ESTROGEN RECEPTOR STATUS: ICD-10-CM

## 2024-09-18 PROCEDURE — 3051F HG A1C>EQUAL 7.0%<8.0%: CPT | Performed by: STUDENT IN AN ORGANIZED HEALTH CARE EDUCATION/TRAINING PROGRAM

## 2024-09-18 PROCEDURE — 1125F AMNT PAIN NOTED PAIN PRSNT: CPT | Performed by: STUDENT IN AN ORGANIZED HEALTH CARE EDUCATION/TRAINING PROGRAM

## 2024-09-18 PROCEDURE — 1159F MED LIST DOCD IN RCRD: CPT | Performed by: STUDENT IN AN ORGANIZED HEALTH CARE EDUCATION/TRAINING PROGRAM

## 2024-09-18 PROCEDURE — 3048F LDL-C <100 MG/DL: CPT | Performed by: STUDENT IN AN ORGANIZED HEALTH CARE EDUCATION/TRAINING PROGRAM

## 2024-09-18 PROCEDURE — 1157F ADVNC CARE PLAN IN RCRD: CPT | Performed by: STUDENT IN AN ORGANIZED HEALTH CARE EDUCATION/TRAINING PROGRAM

## 2024-09-18 PROCEDURE — 3074F SYST BP LT 130 MM HG: CPT | Performed by: STUDENT IN AN ORGANIZED HEALTH CARE EDUCATION/TRAINING PROGRAM

## 2024-09-18 PROCEDURE — 3078F DIAST BP <80 MM HG: CPT | Performed by: STUDENT IN AN ORGANIZED HEALTH CARE EDUCATION/TRAINING PROGRAM

## 2024-09-18 PROCEDURE — 99214 OFFICE O/P EST MOD 30 MIN: CPT | Performed by: STUDENT IN AN ORGANIZED HEALTH CARE EDUCATION/TRAINING PROGRAM

## 2024-09-18 PROCEDURE — 4010F ACE/ARB THERAPY RXD/TAKEN: CPT | Performed by: STUDENT IN AN ORGANIZED HEALTH CARE EDUCATION/TRAINING PROGRAM

## 2024-09-18 PROCEDURE — 99214 OFFICE O/P EST MOD 30 MIN: CPT | Mod: GC | Performed by: STUDENT IN AN ORGANIZED HEALTH CARE EDUCATION/TRAINING PROGRAM

## 2024-09-18 SDOH — ECONOMIC STABILITY: FOOD INSECURITY: WITHIN THE PAST 12 MONTHS, YOU WORRIED THAT YOUR FOOD WOULD RUN OUT BEFORE YOU GOT MONEY TO BUY MORE.: NEVER TRUE

## 2024-09-18 SDOH — HEALTH STABILITY: PHYSICAL HEALTH: ON AVERAGE, HOW MANY MINUTES DO YOU ENGAGE IN EXERCISE AT THIS LEVEL?: 0 MIN

## 2024-09-18 SDOH — HEALTH STABILITY: PHYSICAL HEALTH: ON AVERAGE, HOW MANY DAYS PER WEEK DO YOU ENGAGE IN MODERATE TO STRENUOUS EXERCISE (LIKE A BRISK WALK)?: 0 DAYS

## 2024-09-18 SDOH — ECONOMIC STABILITY: GENERAL
WHICH OF THE FOLLOWING DO YOU KNOW HOW TO USE AND HAVE ACCESS TO EVERY DAY? (CHOOSE ALL THAT APPLY): DESKTOP COMPUTER, LAPTOP COMPUTER, OR TABLET WITH BROADBAND INTERNET CONNECTION;SMARTPHONE WITH CELLULAR DATA PLAN

## 2024-09-18 SDOH — ECONOMIC STABILITY: INCOME INSECURITY: IN THE LAST 12 MONTHS, WAS THERE A TIME WHEN YOU WERE NOT ABLE TO PAY THE MORTGAGE OR RENT ON TIME?: NO

## 2024-09-18 SDOH — ECONOMIC STABILITY: FOOD INSECURITY: WITHIN THE PAST 12 MONTHS, THE FOOD YOU BOUGHT JUST DIDN'T LAST AND YOU DIDN'T HAVE MONEY TO GET MORE.: NEVER TRUE

## 2024-09-18 SDOH — ECONOMIC STABILITY: GENERAL
WHICH OF THE FOLLOWING WOULD YOU LIKE TO GET CONNECTED TO IN ORDER TO RECEIVE A DISCOUNT OR FOR FREE? (CHOOSE ALL THAT APPLY): PATIENT DECLINED

## 2024-09-18 ASSESSMENT — ENCOUNTER SYMPTOMS
SHORTNESS OF BREATH: 0
CHILLS: 0
COUGH: 0
LIGHT-HEADEDNESS: 1
ABDOMINAL PAIN: 0
ADENOPATHY: 0
FATIGUE: 1
FEVER: 0
NAUSEA: 0
CHEST TIGHTNESS: 0
APPETITE CHANGE: 0
LEG SWELLING: 0
DIARRHEA: 0

## 2024-09-18 ASSESSMENT — SOCIAL DETERMINANTS OF HEALTH (SDOH)
WITHIN THE LAST YEAR, HAVE TO BEEN RAPED OR FORCED TO HAVE ANY KIND OF SEXUAL ACTIVITY BY YOUR PARTNER OR EX-PARTNER?: NO
IN A TYPICAL WEEK, HOW MANY TIMES DO YOU TALK ON THE PHONE WITH FAMILY, FRIENDS, OR NEIGHBORS?: MORE THAN THREE TIMES A WEEK
HOW OFTEN DO YOU ATTENT MEETINGS OF THE CLUB OR ORGANIZATION YOU BELONG TO?: NEVER
WITHIN THE LAST YEAR, HAVE YOU BEEN HUMILIATED OR EMOTIONALLY ABUSED IN OTHER WAYS BY YOUR PARTNER OR EX-PARTNER?: NO
DO YOU BELONG TO ANY CLUBS OR ORGANIZATIONS SUCH AS CHURCH GROUPS UNIONS, FRATERNAL OR ATHLETIC GROUPS, OR SCHOOL GROUPS?: NO
HOW HARD IS IT FOR YOU TO PAY FOR THE VERY BASICS LIKE FOOD, HOUSING, MEDICAL CARE, AND HEATING?: NOT VERY HARD
IN THE PAST 12 MONTHS, HAS THE ELECTRIC, GAS, OIL, OR WATER COMPANY THREATENED TO SHUT OFF SERVICE IN YOUR HOME?: NO
WITHIN THE LAST YEAR, HAVE YOU BEEN AFRAID OF YOUR PARTNER OR EX-PARTNER?: NO
WITHIN THE LAST YEAR, HAVE YOU BEEN KICKED, HIT, SLAPPED, OR OTHERWISE PHYSICALLY HURT BY YOUR PARTNER OR EX-PARTNER?: NO
HOW OFTEN DO YOU ATTEND CHURCH OR RELIGIOUS SERVICES?: NEVER
HOW OFTEN DO YOU GET TOGETHER WITH FRIENDS OR RELATIVES?: ONCE A WEEK

## 2024-09-18 ASSESSMENT — PATIENT HEALTH QUESTIONNAIRE - PHQ9
SUM OF ALL RESPONSES TO PHQ9 QUESTIONS 1 & 2: 0
2. FEELING DOWN, DEPRESSED OR HOPELESS: NOT AT ALL
2. FEELING DOWN, DEPRESSED OR HOPELESS: NOT AT ALL
SUM OF ALL RESPONSES TO PHQ9 QUESTIONS 1 AND 2: 0
1. LITTLE INTEREST OR PLEASURE IN DOING THINGS: NOT AT ALL
1. LITTLE INTEREST OR PLEASURE IN DOING THINGS: NOT AT ALL

## 2024-09-18 ASSESSMENT — LIFESTYLE VARIABLES
HOW OFTEN DO YOU HAVE SIX OR MORE DRINKS ON ONE OCCASION: NEVER
HOW MANY STANDARD DRINKS CONTAINING ALCOHOL DO YOU HAVE ON A TYPICAL DAY: PATIENT DOES NOT DRINK
AUDIT-C TOTAL SCORE: 0
SKIP TO QUESTIONS 9-10: 1
HOW OFTEN DO YOU HAVE A DRINK CONTAINING ALCOHOL: NEVER

## 2024-09-18 ASSESSMENT — COLUMBIA-SUICIDE SEVERITY RATING SCALE - C-SSRS
1. IN THE PAST MONTH, HAVE YOU WISHED YOU WERE DEAD OR WISHED YOU COULD GO TO SLEEP AND NOT WAKE UP?: NO
2. HAVE YOU ACTUALLY HAD ANY THOUGHTS OF KILLING YOURSELF?: NO
6. HAVE YOU EVER DONE ANYTHING, STARTED TO DO ANYTHING, OR PREPARED TO DO ANYTHING TO END YOUR LIFE?: NO

## 2024-09-18 ASSESSMENT — PAIN SCALES - GENERAL: PAINLEVEL: 8

## 2024-09-18 NOTE — PROGRESS NOTES
Patient here with her  Leo to establish care with Dr. Birch. Patient was referred here by Dr. Crenshaw. Medications and allergies reviewed with patient.     Patient states that she has been having episodes of dizziness, she uses antivert which helps some. She is scheduled to see Dr. De Dios on 9/27.      She reports chronic pain in her knees , legs and back. She also she states she has fatigue and nausea when she is dizzy.     Patient has already seen Dr. Buitrago on 9/9  Dr. Birch discussed treatment options with patient. She would like to take some time to think about it. Our office will call the patient in a week to discuss her decision. Print out on capecitabine, docetaxel and cyclophosphamide was given to patient to review.

## 2024-09-19 NOTE — PROGRESS NOTES
Patient ID: Dina Mullins is a 73 y.o. female.  Referring Physician: Mabel Crenshaw MD  [address] [place] [address]  Primary Care Provider: Ken Saleh PA-C        Cancer Staging   Malignant neoplasm of overlapping sites of right female breast (Multi)  Staging form: Breast, AJCC 8th Edition  - Clinical stage from 7/1/2024: Stage IB (cT1, cN0(f), cM0, G3, ER-, NJ-, HER2-) - Signed by Mabel Crenshaw MD on 9/3/2024  - Clinical stage from 7/1/2024: Stage IA (cT1, cN0(f), cM0, G1, ER+, NJ+, HER2-) - Signed by Mabel Crenshaw MD on 9/3/2024  - Pathologic stage from 8/14/2024: Stage IIIA (pT2(2), pN1mi(sn), cM0, G3, ER-, NJ-, HER2-) - Signed by Kacie Simms MD on 8/29/2024       Prior Therapy   8/14/24: R breast lumpectomy and SLNBx    Current Therapy      Presenting History  4/17/24: screening mammogram showing indeterminate right breast mass with prominent R axillary LN, BIRADS 0  5/9/24: diagnostic mammogram showing spiculated 12 o'clock mass suspicious for malignancy and a suspicious level 1 R axillary LN, BIRADS 4  7/1/24: ultrasound guided biopsy of 12 o'clock mass showed IDC G1, ER+, NJ+, HER2-, negative R axillary LN. Ultrasound of additional 11 o'clock mass showed IDC G3 TNBC.   8/14/24: R breast lumpectomy and SLNBx     Path:  7/1/24:  FINAL DIAGNOSIS   A. Right breast mass, ultrasound-guided core biopsy at 11 o'clock, 8 cm from nipple:  --  Invasive ductal carcinoma, grade 3 with necrosis, see note.      Note:  In this limited sample, the invasive carcinoma measures up to 0.8 cm in greatest dimension.  ER, NJ and HER2 will be reported in an addendum.      B. Right breast mass, ultrasound-guided core biopsy at 12 o'clock, 9 cm from nipple:   -- Invasive ductal carcinoma, grade 1 with associated microcalcifications, see note.     Note:  In this limited sample, the invasive carcinoma measures up to 9 mm in greatest dimension.  ER, NJ and HER2 will be reported in an addendum.      C. Right  axillary lymph node, ultrasound-guided biopsy:    -- Portion of lymph node with reactive changes; negative for carcinoma, see note.     Note: Immunohistochemical stain for cytokeratin AE1/3 is negative.      8/1/24:  FINAL DIAGNOSIS   A. 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   -- Changes consistent with site of previous biopsy.       B. Right breast lumpectomy at 12 o'clock:   -- Invasive ductal carcinoma, see synoptic report.  -- Changes consistent with site of previous biopsy.      C. Right breast sentinel lymph node with magseed, excision:  -- One of two lymph nodes involved by micrometastasis, see note.   -- Changes consistent with site of previous biopsy.      Note: The largest contiguous focus of tumor involving the lymph node measures 1.6 mm consistent with micrometastasis. Immunohistochemical stains for cytokeratin AE1/3 confirm the presence of tumor in the lymph node. The tumor morphologically appears lower grade and is estrogen and progesterone receptor strongly and diffusely positive consistent with a metastasis of the lower grade tumor at 12 o'clock. No extranodal extension is identified. Multiple deeper levels were reviewed.         Today's Visit  Subjective    HPI  Mrs. Mullins is a 74yo F with PMH of HTN, Parkinson's, afib on rivaroxaban, chronic vertigo, T2DM, and recently resected R sided node positive breast cancer who presents for initial medical oncology evaluation.     History as above. Pathology shows two separate breast cancers:  1) pT2 pN0 R poorly differentiated carcinoma with squamous differentiation (metaplastic), G3, ER negative, AL negative, HER2 negative (1+), 24mm  2) pT1c pN1mi (sn) R IDC, G1, ER+, AL+, HER2 negative (2+ with negative FISH), 14mm     Patient notes severe fatigue since surgery. She is sleeping most of the day. She  notes her vertigo started 10 years ago but was well controlled until 2023. Due to severe vertigo, she needed to retire. This continues to be a problem. She was started on Sinemet a few months ago but her neurologist retired and she does not currently have neurology follow-up. As a result, she has run out of Sinemet and has mild tremors. She notes she has healed well from surgery.       Onset of menses - 11  Onset of menopause - 64  Post-menopausal bleeding - Intermittent bleeding  OCP use:   Estrogen replacement: Denies     Review of Systems   Constitutional:  Positive for fatigue. Negative for appetite change, chills and fever.   HENT:   Negative for lump/mass.    Respiratory:  Negative for chest tightness, cough and shortness of breath.    Cardiovascular:  Negative for chest pain and leg swelling.   Gastrointestinal:  Negative for abdominal pain, diarrhea and nausea.   Neurological:  Positive for light-headedness.   Hematological:  Negative for adenopathy.        Objective   BSA: 2.3 meters squared  /59 (BP Location: Right arm, Patient Position: Sitting, BP Cuff Size: Large adult long)   Pulse 72   Temp 37.4 °C (99.3 °F) (Temporal)   Resp 18   Wt 121 kg (267 lb 12 oz)   SpO2 97%   BMI 48.65 kg/m²     Family History   Problem Relation Name Age of Onset    Cancer Mother      Breast cancer Mother  69    Alzheimer's disease Father       Oncology History   Malignant neoplasm of overlapping sites of right female breast (Multi)   2024 Cancer Staged    Staging form: Breast, AJCC 8th Edition, Clinical stage from 2024: Stage IB (cT1, cN0(f), cM0, G3, ER-, PA-, HER2-) - Signed by Mabel Crenshaw MD on 9/3/2024     2024 Cancer Staged    Staging form: Breast, AJCC 8th Edition, Clinical stage from 2024: Stage IA (cT1, cN0(f), cM0, G1, ER+, PA+, HER2-) - Signed by Mabel Crenshaw MD on 9/3/2024     2024 Initial Diagnosis    Malignant neoplasm of overlapping sites of right female breast  (Multi)     8/14/2024 Cancer Staged    Staging form: Breast, AJCC 8th Edition, Pathologic stage from 8/14/2024: Stage IIIA (pT2(2), pN1mi(sn), cM0, G3, ER-, TN-, HER2-) - Signed by Kacie Simms MD on 8/29/2024         Dina Mullins  reports that she has never smoked. She has never been exposed to tobacco smoke. She has never used smokeless tobacco.  She  reports no history of alcohol use.  She  reports no history of drug use.    Physical Exam  Constitutional:       General: She is not in acute distress.     Appearance: Normal appearance. She is obese.   HENT:      Head: Normocephalic and atraumatic.      Mouth/Throat:      Mouth: Mucous membranes are moist.   Eyes:      General: No scleral icterus.     Extraocular Movements: Extraocular movements intact.      Pupils: Pupils are equal, round, and reactive to light.   Cardiovascular:      Rate and Rhythm: Normal rate and regular rhythm.      Heart sounds: No murmur heard.  Pulmonary:      Effort: No respiratory distress.      Breath sounds: Normal breath sounds.   Abdominal:      General: Abdomen is flat. There is no distension.      Palpations: Abdomen is soft.      Tenderness: There is no abdominal tenderness.   Musculoskeletal:         General: Swelling present.   Lymphadenopathy:      Cervical: No cervical adenopathy.   Skin:     General: Skin is warm and dry.   Neurological:      General: No focal deficit present.      Mental Status: She is alert and oriented to person, place, and time.         Performance Status:  Symptomatic; in bed >50% of the day    Assessment/Plan      Mrs. Mullins is a 72yo F with PMH of HTN, Parkinson's, afib on rivaroxaban, chronic vertigo, T2DM, and recently resected R sided node positive breast cancer who presents for initial medical oncology evaluation.     History as above. Pathology shows two separate breast cancers:  1) pT2 pN0 R poorly differentiated carcinoma with squamous differentiation (metaplastic), G3, ER  negative, AK negative, HER2 negative (1+), 24mm  2) pT1c pN1mi (sn) R IDC, G1, ER+, AK+, HER2 negative (2+ with negative FISH), 14mm     Discussed in detail patient's data to date. Explained she has two separate kinds of breast cancer. Explained that her TNBC is typically more aggressive. Explained that since her tumor is greater than 2cm, adjuvant chemotherapy is generally recommended. Explained that adjuvant RT is also recommended, as discussed with radiation oncology previously. Explained that endocrine therapy afterwards would be recommended for her hormone positive cancer.     Discussed various kinds of adjuvant systemic options including anthracycline based regimens and TC. Discussed our concerns of patient's performance status and potential ability to tolerate aggressive adjuvant regimens. Discussed off-label capecitabine as a potential option as well vs deferring chemotherapy altogether. All questions answered. Patient would like time to think over her options and discuss with her sister (who is undergoing cancer treatments herself).     Plan:  - provided information on TC and capecitabine   - will call patient next week to discuss next steps further    Patient seen and discussed with Dr. Birch.     Deep Kapoor MD  Hematology-Oncology Fellow, PGY5

## 2024-09-26 ENCOUNTER — OFFICE VISIT (OUTPATIENT)
Dept: GYNECOLOGIC ONCOLOGY | Facility: CLINIC | Age: 74
End: 2024-09-26
Payer: MEDICARE

## 2024-09-26 VITALS
DIASTOLIC BLOOD PRESSURE: 55 MMHG | WEIGHT: 268.52 LBS | OXYGEN SATURATION: 95 % | HEIGHT: 62 IN | SYSTOLIC BLOOD PRESSURE: 118 MMHG | TEMPERATURE: 98.6 F | HEART RATE: 56 BPM | BODY MASS INDEX: 49.41 KG/M2 | RESPIRATION RATE: 18 BRPM

## 2024-09-26 DIAGNOSIS — N95.0 POST-MENOPAUSAL BLEEDING: ICD-10-CM

## 2024-09-26 DIAGNOSIS — C50.911 MALIGNANT NEOPLASM OF RIGHT FEMALE BREAST, UNSPECIFIED ESTROGEN RECEPTOR STATUS, UNSPECIFIED SITE OF BREAST: Primary | ICD-10-CM

## 2024-09-26 DIAGNOSIS — C54.1 ENDOMETRIAL CANCER (MULTI): ICD-10-CM

## 2024-09-26 PROCEDURE — 3008F BODY MASS INDEX DOCD: CPT | Performed by: STUDENT IN AN ORGANIZED HEALTH CARE EDUCATION/TRAINING PROGRAM

## 2024-09-26 PROCEDURE — 1159F MED LIST DOCD IN RCRD: CPT | Performed by: STUDENT IN AN ORGANIZED HEALTH CARE EDUCATION/TRAINING PROGRAM

## 2024-09-26 PROCEDURE — 1157F ADVNC CARE PLAN IN RCRD: CPT | Performed by: STUDENT IN AN ORGANIZED HEALTH CARE EDUCATION/TRAINING PROGRAM

## 2024-09-26 PROCEDURE — 4010F ACE/ARB THERAPY RXD/TAKEN: CPT | Performed by: STUDENT IN AN ORGANIZED HEALTH CARE EDUCATION/TRAINING PROGRAM

## 2024-09-26 PROCEDURE — 99215 OFFICE O/P EST HI 40 MIN: CPT | Performed by: STUDENT IN AN ORGANIZED HEALTH CARE EDUCATION/TRAINING PROGRAM

## 2024-09-26 PROCEDURE — 1125F AMNT PAIN NOTED PAIN PRSNT: CPT | Performed by: STUDENT IN AN ORGANIZED HEALTH CARE EDUCATION/TRAINING PROGRAM

## 2024-09-26 PROCEDURE — 58100 BIOPSY OF UTERUS LINING: CPT | Performed by: STUDENT IN AN ORGANIZED HEALTH CARE EDUCATION/TRAINING PROGRAM

## 2024-09-26 PROCEDURE — 3078F DIAST BP <80 MM HG: CPT | Performed by: STUDENT IN AN ORGANIZED HEALTH CARE EDUCATION/TRAINING PROGRAM

## 2024-09-26 PROCEDURE — 3074F SYST BP LT 130 MM HG: CPT | Performed by: STUDENT IN AN ORGANIZED HEALTH CARE EDUCATION/TRAINING PROGRAM

## 2024-09-26 PROCEDURE — 3051F HG A1C>EQUAL 7.0%<8.0%: CPT | Performed by: STUDENT IN AN ORGANIZED HEALTH CARE EDUCATION/TRAINING PROGRAM

## 2024-09-26 PROCEDURE — 99205 OFFICE O/P NEW HI 60 MIN: CPT | Performed by: STUDENT IN AN ORGANIZED HEALTH CARE EDUCATION/TRAINING PROGRAM

## 2024-09-26 PROCEDURE — 3048F LDL-C <100 MG/DL: CPT | Performed by: STUDENT IN AN ORGANIZED HEALTH CARE EDUCATION/TRAINING PROGRAM

## 2024-09-26 RX ORDER — ACETAMINOPHEN 325 MG/1
975 TABLET ORAL ONCE
OUTPATIENT
Start: 2024-09-26 | End: 2024-09-26

## 2024-09-26 RX ORDER — GABAPENTIN 600 MG/1
600 TABLET ORAL ONCE
OUTPATIENT
Start: 2024-09-26 | End: 2024-09-26

## 2024-09-26 RX ORDER — SODIUM CHLORIDE, SODIUM LACTATE, POTASSIUM CHLORIDE, CALCIUM CHLORIDE 600; 310; 30; 20 MG/100ML; MG/100ML; MG/100ML; MG/100ML
20 INJECTION, SOLUTION INTRAVENOUS CONTINUOUS
OUTPATIENT
Start: 2024-09-26

## 2024-09-26 RX ORDER — CELECOXIB 400 MG/1
400 CAPSULE ORAL ONCE
OUTPATIENT
Start: 2024-09-26 | End: 2024-09-26

## 2024-09-26 ASSESSMENT — PATIENT HEALTH QUESTIONNAIRE - PHQ9
8. MOVING OR SPEAKING SO SLOWLY THAT OTHER PEOPLE COULD HAVE NOTICED. OR THE OPPOSITE, BEING SO FIGETY OR RESTLESS THAT YOU HAVE BEEN MOVING AROUND A LOT MORE THAN USUAL: NOT AT ALL
SUM OF ALL RESPONSES TO PHQ9 QUESTIONS 1 AND 2: 6
2. FEELING DOWN, DEPRESSED OR HOPELESS: NEARLY EVERY DAY
5. POOR APPETITE OR OVEREATING: NOT AT ALL
10. IF YOU CHECKED OFF ANY PROBLEMS, HOW DIFFICULT HAVE THESE PROBLEMS MADE IT FOR YOU TO DO YOUR WORK, TAKE CARE OF THINGS AT HOME, OR GET ALONG WITH OTHER PEOPLE: NOT DIFFICULT AT ALL
3. TROUBLE FALLING OR STAYING ASLEEP OR SLEEPING TOO MUCH: NOT AT ALL
SUM OF ALL RESPONSES TO PHQ QUESTIONS 1-9: 9
6. FEELING BAD ABOUT YOURSELF - OR THAT YOU ARE A FAILURE OR HAVE LET YOURSELF OR YOUR FAMILY DOWN: NOT AT ALL
1. LITTLE INTEREST OR PLEASURE IN DOING THINGS: NEARLY EVERY DAY
7. TROUBLE CONCENTRATING ON THINGS, SUCH AS READING THE NEWSPAPER OR WATCHING TELEVISION: NOT AT ALL
4. FEELING TIRED OR HAVING LITTLE ENERGY: NEARLY EVERY DAY
9. THOUGHTS THAT YOU WOULD BE BETTER OFF DEAD, OR OF HURTING YOURSELF: NOT AT ALL

## 2024-09-26 ASSESSMENT — PAIN SCALES - GENERAL: PAINLEVEL: 7

## 2024-09-26 ASSESSMENT — ENCOUNTER SYMPTOMS
DEPRESSION: 1
LOSS OF SENSATION IN FEET: 0
OCCASIONAL FEELINGS OF UNSTEADINESS: 1

## 2024-09-26 NOTE — PROGRESS NOTES
Gynecologic Oncology Initial Consultation    [place] [facility]    Patient ID: Dina Mullins is a 73 y.o. female.  Referring Physician: Bib Buitrago MD  [address]  [place] [address]  Primary Care Provider: Ken Saleh PA-C      Reason for Consultation: postmenopausal bleeding    Subjective    74yo  with newly diagnosed breast cancer for GYN Oncology evaluation for suspected uterine mass and postmenopausal bleeding. Reports postmenopausal bleeding for past few months. She was recently diagnosed with breast cancer per treatment history as documented and during evaluation with Dr Buitrago, noted postmenopausal bleeding and ordered pelvic US. Patient reports intermittent spotting, light spotting and bleeding when going to bathroom. Using pad for incontinence and noting spotting on pad. States she has had dizziness since start of this year and history of vertigo that has significantly worsened this year prompting her long term; following up with ENT tomorrow for further evaluation and treatment recommendations. Denies headaches; increasingly blurred vision that has been gradual with occasional floaters. Denies chest pain, shortness of breath. Ambulates with cane at baseline. Reports history of urinary urgency; occasional leakage of urine with cough/laugh/sneezing. No changes in bowel habits - denies blood in stools or changes in consistency/frequency.     A comprehensive review of systems was performed and otherwise negative.      Treatment History  Synchronous breast primaries (R)  11 o'clock: stage IB invasive poorly differentiated carcinoma with squamous differentiation (ER/VT/Her2 neg) pT2 pN0   12 o'clock: invasive ductal carcinoma - grade 1 (ER >95%, VT 85%, Her2 equiv) pT1c pN1mi (sn)  - Recommend TC vs off label capecitabine in s/o medical comorbidities     Objective   BSA: 2.31 meters squared  /55 (BP Location: Right arm, Patient Position: Sitting, BP Cuff Size: Adult long)    "Pulse 56   Temp 37 °C (98.6 °F)   Resp 18   Ht (S) 1.578 m (5' 2.13\")   Wt 122 kg (268 lb 8.3 oz)   SpO2 95%   BMI 48.91 kg/m²     PMH:  Past Medical History:   Diagnosis Date    Anemia     Arthritis     Atrial fib/flutter, transient (Multi)     Bilateral tinnitus     Chronic kidney failure     stage three    DM (diabetes mellitus) (Multi)     Hypertension     Left arm pain     Sepsis (Multi)     Septic shock (Multi)     Stomach ulcer     Vertigo    - Insulin dependent Type 2 DM  (on Lantus)    PSH:  - R breast lumpectomy with SLNBx x2  - C/section x1 +tubal ligation   - R groin soft tissue debridement in s/o soft tissue necrotizing infection      Family History   Problem Relation Name Age of Onset    Cancer Mother      Breast cancer Mother  69    Alzheimer's disease Father       OB/GYN Hx: ; FTSVD x2 with C/section x1  - Menarche age 11; menopausal age 64 with PMB  - Last Pap >15 years - denies prior abnormal     Dina Mullins  reports that she has never smoked. She has never been exposed to tobacco smoke. She has never used smokeless tobacco.  She  reports no history of alcohol use.  She  reports no history of drug use.    Physical Exam  General:   alert and oriented, in no acute distress   Heart: regular rate and rhythm and S1, S2 normal   Lungs: clear to auscultation bilaterally   Abdomen: soft, protuberant, nondistended, and normal bowel sounds   Vulva: normal; small blood on perineum   Vagina: normal mucosa, scant blood   Cervix: Endocervical polyp noted; grossly normal ectocervix without lesions   Uterus: mobile, non-tender, limited by habitus   Adnexa: no mass, fullness, tenderness   Rectal: deferred   Lymph Nodes:  No inguinal lymphadenopathy    Extremities: Warm and well perfused; trace BLE edema with chronic venous stasis changes   Skin: Normal     Procedure - Endometrial Biopsy  Verbal consent obtained from patient after informed discussion regarding risks/benefits of procedure. "   Speculum placed in vagina and endocervical polyp grasped with ring forcep. Cervix cleansed with betadine solution. Anterior lip of cervix grasped with a tenaculum. Pipelle passed through the cervix without difficulty and uterus sounded to depth of 8cm. Tissue sampling was adequate and hemostasis noted.    Imaging:  TVUS - 9/13/24  IMPRESSION:  Abnormal endometrial thickness of 1.1 cm with a 1.0 x 1.2 x 1.3 cm  solid and slightly hyperechoic submucosal mass demonstrated. This may  represent a submucosal leiomyoma but the possibility of an  endometrial polyp should be considered. The abnormal endometrial  thickness could indicate endometrial hyperplasia or even endometrial  carcinoma.      1.6 x 1.8 x 1.8 cm intramural leiomyoma left side of the uterine body.    Performance Status:  Symptomatic; fully ambulatory    Assessment/Plan      Oncology History   Malignant neoplasm of overlapping sites of right female breast (Multi)   7/1/2024 Cancer Staged    Staging form: Breast, AJCC 8th Edition, Clinical stage from 7/1/2024: Stage IB (cT1, cN0(f), cM0, G3, ER-, AZ-, HER2-) - Signed by Mabel Crenshaw MD on 9/3/2024     7/1/2024 Cancer Staged    Staging form: Breast, AJCC 8th Edition, Clinical stage from 7/1/2024: Stage IA (cT1, cN0(f), cM0, G1, ER+, AZ+, HER2-) - Signed by Mabel Crenshaw MD on 9/3/2024     7/16/2024 Initial Diagnosis    Malignant neoplasm of overlapping sites of right female breast (Multi)     8/14/2024 Cancer Staged    Staging form: Breast, AJCC 8th Edition, Pathologic stage from 8/14/2024: Stage IIIA (pT2(2), pN1mi(sn), cM0, G3, ER-, AZ-, HER2-) - Signed by Kacie Simms MD on 8/29/2024        Diagnoses and all orders for this visit:  Post-menopausal bleeding  - Endometrial biopsy and endocervical polypectomy performed in office; clinical history suspicious for synchronous endometrial primary in setting of personal risk factors and synchronous obesity-associated cancer; history of persistent postmenopausal  bleeding  - Differential diagnosis discussed with patient including benign, preinvasive and malignant pathology  - Recommend further characterization with PET/CT (unable to perform CT +IV contrast 2/2 renal insufficiency) to inform treatment planning re: uterine and breast cancers  -     Surgical Pathology Exam  -     NM PET CT FDG oncology; Future  [ ] F/u pending imaging, CT results; anticipate OR for robot-assisted hysterectomy, BSO 11/6/24  Endometrial cancer (Multi)  Malignant neoplasm of right female breast, unspecified estrogen receptor status, unspecified site of breast (Multi)  - Will correspond with Dr Birch re: treatment plan; patient pending evaluation of uterine mass suspicious for synchronous primary endometrial cancer     Mei Black MD

## 2024-09-27 ENCOUNTER — APPOINTMENT (OUTPATIENT)
Dept: OTOLARYNGOLOGY | Facility: CLINIC | Age: 74
End: 2024-09-27
Payer: MEDICARE

## 2024-09-27 ENCOUNTER — APPOINTMENT (OUTPATIENT)
Dept: RADIOLOGY | Facility: CLINIC | Age: 74
End: 2024-09-27
Payer: MEDICARE

## 2024-09-27 VITALS — BODY MASS INDEX: 49.11 KG/M2 | HEIGHT: 62 IN | TEMPERATURE: 98.4 F

## 2024-09-27 DIAGNOSIS — H90.3 ASYMMETRICAL SENSORINEURAL HEARING LOSS: ICD-10-CM

## 2024-09-27 DIAGNOSIS — R42 DIZZINESS: Primary | ICD-10-CM

## 2024-09-27 PROCEDURE — 1157F ADVNC CARE PLAN IN RCRD: CPT | Performed by: OTOLARYNGOLOGY

## 2024-09-27 PROCEDURE — 1036F TOBACCO NON-USER: CPT | Performed by: OTOLARYNGOLOGY

## 2024-09-27 PROCEDURE — 3048F LDL-C <100 MG/DL: CPT | Performed by: OTOLARYNGOLOGY

## 2024-09-27 PROCEDURE — 1159F MED LIST DOCD IN RCRD: CPT | Performed by: OTOLARYNGOLOGY

## 2024-09-27 PROCEDURE — 1160F RVW MEDS BY RX/DR IN RCRD: CPT | Performed by: OTOLARYNGOLOGY

## 2024-09-27 PROCEDURE — 99214 OFFICE O/P EST MOD 30 MIN: CPT | Performed by: OTOLARYNGOLOGY

## 2024-09-27 PROCEDURE — 3051F HG A1C>EQUAL 7.0%<8.0%: CPT | Performed by: OTOLARYNGOLOGY

## 2024-09-27 PROCEDURE — 4010F ACE/ARB THERAPY RXD/TAKEN: CPT | Performed by: OTOLARYNGOLOGY

## 2024-09-30 ENCOUNTER — HOSPITAL ENCOUNTER (OUTPATIENT)
Dept: RADIOLOGY | Facility: CLINIC | Age: 74
Discharge: HOME | End: 2024-09-30
Payer: MEDICARE

## 2024-09-30 DIAGNOSIS — C54.1 ENDOMETRIAL CANCER (MULTI): ICD-10-CM

## 2024-09-30 DIAGNOSIS — C50.911 MALIGNANT NEOPLASM OF RIGHT FEMALE BREAST, UNSPECIFIED ESTROGEN RECEPTOR STATUS, UNSPECIFIED SITE OF BREAST: ICD-10-CM

## 2024-09-30 PROCEDURE — 3430000001 HC RX 343 DIAGNOSTIC RADIOPHARMACEUTICALS: Performed by: STUDENT IN AN ORGANIZED HEALTH CARE EDUCATION/TRAINING PROGRAM

## 2024-09-30 PROCEDURE — A9552 F18 FDG: HCPCS | Performed by: STUDENT IN AN ORGANIZED HEALTH CARE EDUCATION/TRAINING PROGRAM

## 2024-09-30 PROCEDURE — 78815 PET IMAGE W/CT SKULL-THIGH: CPT | Performed by: STUDENT IN AN ORGANIZED HEALTH CARE EDUCATION/TRAINING PROGRAM

## 2024-09-30 PROCEDURE — 78815 PET IMAGE W/CT SKULL-THIGH: CPT | Mod: PI

## 2024-09-30 RX ORDER — FLUDEOXYGLUCOSE F 18 200 MCI/ML
12.23 INJECTION, SOLUTION INTRAVENOUS
Status: COMPLETED | OUTPATIENT
Start: 2024-09-30 | End: 2024-09-30

## 2024-10-03 ENCOUNTER — OFFICE VISIT (OUTPATIENT)
Dept: GYNECOLOGIC ONCOLOGY | Facility: CLINIC | Age: 74
End: 2024-10-03
Payer: MEDICARE

## 2024-10-03 ENCOUNTER — APPOINTMENT (OUTPATIENT)
Dept: GYNECOLOGIC ONCOLOGY | Facility: CLINIC | Age: 74
End: 2024-10-03
Payer: MEDICARE

## 2024-10-03 ENCOUNTER — HOSPITAL ENCOUNTER (OUTPATIENT)
Dept: RADIATION ONCOLOGY | Facility: CLINIC | Age: 74
Setting detail: RADIATION/ONCOLOGY SERIES
Discharge: HOME | End: 2024-10-03
Payer: MEDICARE

## 2024-10-03 VITALS
DIASTOLIC BLOOD PRESSURE: 75 MMHG | BODY MASS INDEX: 49.42 KG/M2 | HEART RATE: 67 BPM | TEMPERATURE: 98.6 F | SYSTOLIC BLOOD PRESSURE: 150 MMHG | WEIGHT: 270.17 LBS | OXYGEN SATURATION: 97 %

## 2024-10-03 VITALS
RESPIRATION RATE: 18 BRPM | WEIGHT: 271.17 LBS | TEMPERATURE: 98.6 F | OXYGEN SATURATION: 97 % | SYSTOLIC BLOOD PRESSURE: 150 MMHG | BODY MASS INDEX: 49.6 KG/M2 | DIASTOLIC BLOOD PRESSURE: 75 MMHG | HEART RATE: 67 BPM

## 2024-10-03 DIAGNOSIS — C50.811 MALIGNANT NEOPLASM OF OVERLAPPING SITES OF RIGHT FEMALE BREAST, UNSPECIFIED ESTROGEN RECEPTOR STATUS: Primary | ICD-10-CM

## 2024-10-03 DIAGNOSIS — N95.0 POST-MENOPAUSAL BLEEDING: ICD-10-CM

## 2024-10-03 DIAGNOSIS — C54.1 ENDOMETRIAL CANCER (MULTI): ICD-10-CM

## 2024-10-03 DIAGNOSIS — C54.1 ENDOMETRIAL CANCER (MULTI): Primary | ICD-10-CM

## 2024-10-03 LAB
LAB AP ASR DISCLAIMER: NORMAL
LABORATORY COMMENT REPORT: NORMAL
PATH REPORT.FINAL DX SPEC: NORMAL
PATH REPORT.GROSS SPEC: NORMAL
PATH REPORT.RELEVANT HX SPEC: NORMAL
PATH REPORT.TOTAL CANCER: NORMAL

## 2024-10-03 PROCEDURE — 99215 OFFICE O/P EST HI 40 MIN: CPT | Performed by: STUDENT IN AN ORGANIZED HEALTH CARE EDUCATION/TRAINING PROGRAM

## 2024-10-03 PROCEDURE — 3078F DIAST BP <80 MM HG: CPT | Performed by: STUDENT IN AN ORGANIZED HEALTH CARE EDUCATION/TRAINING PROGRAM

## 2024-10-03 PROCEDURE — 1160F RVW MEDS BY RX/DR IN RCRD: CPT | Performed by: STUDENT IN AN ORGANIZED HEALTH CARE EDUCATION/TRAINING PROGRAM

## 2024-10-03 PROCEDURE — 1036F TOBACCO NON-USER: CPT | Performed by: STUDENT IN AN ORGANIZED HEALTH CARE EDUCATION/TRAINING PROGRAM

## 2024-10-03 PROCEDURE — 3048F LDL-C <100 MG/DL: CPT | Performed by: STUDENT IN AN ORGANIZED HEALTH CARE EDUCATION/TRAINING PROGRAM

## 2024-10-03 PROCEDURE — 1159F MED LIST DOCD IN RCRD: CPT | Performed by: STUDENT IN AN ORGANIZED HEALTH CARE EDUCATION/TRAINING PROGRAM

## 2024-10-03 PROCEDURE — 3077F SYST BP >= 140 MM HG: CPT | Performed by: STUDENT IN AN ORGANIZED HEALTH CARE EDUCATION/TRAINING PROGRAM

## 2024-10-03 PROCEDURE — 4010F ACE/ARB THERAPY RXD/TAKEN: CPT | Performed by: STUDENT IN AN ORGANIZED HEALTH CARE EDUCATION/TRAINING PROGRAM

## 2024-10-03 PROCEDURE — 3051F HG A1C>EQUAL 7.0%<8.0%: CPT | Performed by: STUDENT IN AN ORGANIZED HEALTH CARE EDUCATION/TRAINING PROGRAM

## 2024-10-03 PROCEDURE — 1126F AMNT PAIN NOTED NONE PRSNT: CPT | Performed by: STUDENT IN AN ORGANIZED HEALTH CARE EDUCATION/TRAINING PROGRAM

## 2024-10-03 PROCEDURE — 1157F ADVNC CARE PLAN IN RCRD: CPT | Performed by: STUDENT IN AN ORGANIZED HEALTH CARE EDUCATION/TRAINING PROGRAM

## 2024-10-03 RX ORDER — MEGESTROL ACETATE 40 MG/1
40 TABLET ORAL 2 TIMES DAILY
Qty: 60 TABLET | Refills: 1 | Status: SHIPPED | OUTPATIENT
Start: 2024-10-03 | End: 2025-10-03

## 2024-10-03 ASSESSMENT — PATIENT HEALTH QUESTIONNAIRE - PHQ9
7. TROUBLE CONCENTRATING ON THINGS, SUCH AS READING THE NEWSPAPER OR WATCHING TELEVISION: NOT AT ALL
9. THOUGHTS THAT YOU WOULD BE BETTER OFF DEAD, OR OF HURTING YOURSELF: NOT AT ALL
2. FEELING DOWN, DEPRESSED OR HOPELESS: MORE THAN HALF THE DAYS
6. FEELING BAD ABOUT YOURSELF - OR THAT YOU ARE A FAILURE OR HAVE LET YOURSELF OR YOUR FAMILY DOWN: NOT AT ALL
10. IF YOU CHECKED OFF ANY PROBLEMS, HOW DIFFICULT HAVE THESE PROBLEMS MADE IT FOR YOU TO DO YOUR WORK, TAKE CARE OF THINGS AT HOME, OR GET ALONG WITH OTHER PEOPLE: VERY DIFFICULT
1. LITTLE INTEREST OR PLEASURE IN DOING THINGS: MORE THAN HALF THE DAYS
3. TROUBLE FALLING OR STAYING ASLEEP OR SLEEPING TOO MUCH: NOT AT ALL
4. FEELING TIRED OR HAVING LITTLE ENERGY: NEARLY EVERY DAY
5. POOR APPETITE OR OVEREATING: NOT AT ALL
SUM OF ALL RESPONSES TO PHQ9 QUESTIONS 1 AND 2: 4
8. MOVING OR SPEAKING SO SLOWLY THAT OTHER PEOPLE COULD HAVE NOTICED. OR THE OPPOSITE, BEING SO FIGETY OR RESTLESS THAT YOU HAVE BEEN MOVING AROUND A LOT MORE THAN USUAL: NOT AT ALL
SUM OF ALL RESPONSES TO PHQ QUESTIONS 1-9: 7

## 2024-10-03 ASSESSMENT — PAIN SCALES - GENERAL
PAINLEVEL: 0-NO PAIN
PAINLEVEL: 0-NO PAIN

## 2024-10-03 ASSESSMENT — ENCOUNTER SYMPTOMS
LOSS OF SENSATION IN FEET: 0
DEPRESSION: 1
LOSS OF SENSATION IN FEET: 1
DEPRESSION: 0
OCCASIONAL FEELINGS OF UNSTEADINESS: 1
OCCASIONAL FEELINGS OF UNSTEADINESS: 1

## 2024-10-03 NOTE — PROGRESS NOTES
Patient ID: Dina Mullins is a 74 y.o. female.  Referring Physician: No referring provider defined for this encounter.  Primary Care Provider: Ken Saleh PA-C    Subjective    Patient presenting for biopsy results discussion after recent EMB; planning ongoing related to breast cancer diagnosis. Ongoing vaginal spotting - otherwise no changes compared to prior assessment. Appointment with Dr Buitrago pending for this afternoon. No fevers/chills; baseline shortness of breath or other changes.     A comprehensive review of systems was performed and otherwise negative.      Objective    BSA: 2.32 meters squared  /75 (BP Location: Left arm, Patient Position: Sitting, BP Cuff Size: Adult long)   Pulse 67   Temp 37 °C (98.6 °F) (Temporal)   Wt 123 kg (270 lb 2.8 oz)   SpO2 97%   BMI 49.42 kg/m²      Physical Exam  Vitals and nursing note reviewed.   Constitutional:       General: She is not in acute distress.     Appearance: Normal appearance.   HENT:      Head: Normocephalic and atraumatic.      Mouth/Throat:      Mouth: Mucous membranes are moist.      Pharynx: Oropharynx is clear.   Eyes:      Extraocular Movements: Extraocular movements intact.      Conjunctiva/sclera: Conjunctivae normal.      Pupils: Pupils are equal, round, and reactive to light.   Cardiovascular:      Rate and Rhythm: Normal rate and regular rhythm.      Pulses: Normal pulses.   Pulmonary:      Effort: Pulmonary effort is normal.      Breath sounds: Normal breath sounds. No wheezing, rhonchi or rales.   Abdominal:      General: There is no distension.      Palpations: Abdomen is soft. There is no mass.      Tenderness: There is no abdominal tenderness.   Genitourinary:     Comments: Deferred  Musculoskeletal:         General: Normal range of motion.      Cervical back: Normal range of motion and neck supple.      Comments: Ambulates with cane at baseline   Skin:     General: Skin is warm.      Findings: No rash.   Neurological:       General: No focal deficit present.      Mental Status: She is alert and oriented to person, place, and time.   Psychiatric:         Mood and Affect: Mood normal.         Behavior: Behavior normal.       Recent Imaging:     Pathology:  Endometrial biopsy - 9/26/24  A.  ENDOMETRIUM, BIOPSY:              Endometrial adenocarcinoma, grade 2, see comment.  Comment: The results of p53 and MMR immunostains will be reported in addenda.  B.  CERVIX, BIOPSY:              Fragments of inflamed endocervical mucosa, clinically polyp.    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 ECOG 1.   Oncology History   Malignant neoplasm of overlapping sites of right female breast   7/1/2024 Cancer Staged    Staging form: Breast, AJCC 8th Edition, Clinical stage from 7/1/2024: Stage IB (cT1, cN0(f), cM0, G3, ER-, CT-, HER2-) - Signed by Mabel Crenshaw MD on 9/3/2024     7/1/2024 Cancer Staged    Staging form: Breast, AJCC 8th Edition, Clinical stage from 7/1/2024: Stage IA (cT1, cN0(f), cM0, G1, ER+, CT+, HER2-) - Signed by Mabel Crenshaw MD on 9/3/2024     7/16/2024 Initial Diagnosis    Malignant neoplasm of overlapping sites of right female breast (Multi)     8/14/2024 Cancer Staged    Staging form: Breast, AJCC 8th Edition, Pathologic stage from 8/14/2024: Stage IIIA (pT2(2), pN1mi(sn), cM0, G3, ER-, CT-, HER2-) - Signed by Kacie Simms MD on 8/29/2024       Post-menopausal bleeding   Endometrial cancer  - The natural history of endometrial carcinoma was discussed in setting of her pathology.  We discussed the significance of tumor grade and stage, and that a majority of women are at early stage at time of diagnosis as determined by surgical pathology. Standard of care treatment options and alternatives were reviewed, including the  recommendation for surgical management with hysterectomy, bilateral salpingo-oophorectomy, and sentinel lymph node evaluation via minimally invasive approach. We discussed the benefits of minimally invasive surgery over open surgical techniques including shorter postoperative recovery, shorter duration of hospitalization and return to baseline activity levels. Patient was counseled regarding risk of conversion to laparotomy and possible full lymphadenectomy in setting of unsuccessful lymph node mapping.  - She was counseled definitive sequencing of treatment pending initiation of therapy for her breast cancer will determine if surgical management of her breast cancer will precede hysterectomy vs interim between chemo/RT for breast cancer  - If deferring management of endometrial cancer, will discuss risks re: Megace therapy for temporizing treatment prior to hysterectomy with Dr Birch  - Reviewed postoperative expectations and instructions, including pelvic rest for 6 weeks, no heavy lifting for 4 weeks. Surgical consents reviewed and signed in office.   - PAT testing prior to surgery per protocol  - Overnight observation recommended    Malignant neoplasm of right female breast, unspecified estrogen receptor status, unspecified site of breast (Multi)  - PET/CT with concern for metastasis vs. Postoperative change; note negative sLNBx on surgical pathology  - Correspondence with Dr Buitrago, Dr Birch, Dr Crenshaw given plan for primary treatment for breast cancer with chest RT; after consultation favoring primary treatment for more aggressive tumor type and defer surgical treatment for endometrial cancer pending completion of RT    Mei Black MD

## 2024-10-03 NOTE — PROGRESS NOTES
Patient seen with  for right breast cancer consult. Patient recently diagnosed with uterine cancer, scheduled for hysterectomy on 11.6.24. Patient reports some tenderness in right breast, surgery healing well. Consents signed, patient given handouts and education on radiation therapy, CT/simulation, and possible side effects. Appointment card given for CT/Simulation. Patient verbalizes understanding with verbal teach back.

## 2024-10-04 ASSESSMENT — ENCOUNTER SYMPTOMS
SHORTNESS OF BREATH: 0
CHILLS: 0
FEVER: 0

## 2024-10-07 ENCOUNTER — TELEPHONE (OUTPATIENT)
Dept: RADIATION ONCOLOGY | Facility: CLINIC | Age: 74
End: 2024-10-07
Payer: MEDICARE

## 2024-10-08 ENCOUNTER — APPOINTMENT (OUTPATIENT)
Dept: HEMATOLOGY/ONCOLOGY | Facility: CLINIC | Age: 74
End: 2024-10-08
Payer: MEDICARE

## 2024-10-08 ENCOUNTER — HOSPITAL ENCOUNTER (OUTPATIENT)
Dept: RADIATION ONCOLOGY | Facility: CLINIC | Age: 74
Setting detail: RADIATION/ONCOLOGY SERIES
Discharge: HOME | End: 2024-10-08
Payer: MEDICARE

## 2024-10-08 ENCOUNTER — HOSPITAL ENCOUNTER (OUTPATIENT)
Dept: RADIOLOGY | Facility: EXTERNAL LOCATION | Age: 74
Discharge: HOME | End: 2024-10-08

## 2024-10-08 VITALS
RESPIRATION RATE: 18 BRPM | DIASTOLIC BLOOD PRESSURE: 61 MMHG | OXYGEN SATURATION: 100 % | HEART RATE: 66 BPM | SYSTOLIC BLOOD PRESSURE: 159 MMHG | TEMPERATURE: 97.2 F

## 2024-10-08 DIAGNOSIS — C50.811 MALIGNANT NEOPLASM OF OVERLAPPING SITES OF RIGHT FEMALE BREAST, UNSPECIFIED ESTROGEN RECEPTOR STATUS: ICD-10-CM

## 2024-10-08 PROCEDURE — 77332 RADIATION TREATMENT AID(S): CPT | Mod: 59 | Performed by: STUDENT IN AN ORGANIZED HEALTH CARE EDUCATION/TRAINING PROGRAM

## 2024-10-08 PROCEDURE — 77334 RADIATION TREATMENT AID(S): CPT | Performed by: STUDENT IN AN ORGANIZED HEALTH CARE EDUCATION/TRAINING PROGRAM

## 2024-10-08 PROCEDURE — 77290 THER RAD SIMULAJ FIELD CPLX: CPT | Performed by: STUDENT IN AN ORGANIZED HEALTH CARE EDUCATION/TRAINING PROGRAM

## 2024-10-10 DIAGNOSIS — I10 BENIGN ESSENTIAL HTN: ICD-10-CM

## 2024-10-10 DIAGNOSIS — E11.69 TYPE 2 DIABETES MELLITUS WITH OBESITY (MULTI): ICD-10-CM

## 2024-10-10 DIAGNOSIS — E66.9 TYPE 2 DIABETES MELLITUS WITH OBESITY (MULTI): ICD-10-CM

## 2024-10-10 RX ORDER — LISINOPRIL 10 MG/1
10 TABLET ORAL DAILY
Qty: 90 TABLET | Refills: 0 | Status: SHIPPED | OUTPATIENT
Start: 2024-10-10

## 2024-10-10 RX ORDER — INSULIN GLARGINE 100 [IU]/ML
25 INJECTION, SOLUTION SUBCUTANEOUS EVERY MORNING
Qty: 9 ML | Refills: 0 | Status: SHIPPED | OUTPATIENT
Start: 2024-10-10

## 2024-10-14 DIAGNOSIS — C50.811 MALIGNANT NEOPLASM OF OVERLAPPING SITES OF RIGHT FEMALE BREAST, UNSPECIFIED ESTROGEN RECEPTOR STATUS: Primary | ICD-10-CM

## 2024-10-14 NOTE — PROGRESS NOTES
Given concern on PET/CT for possible recurrence, rule out recurrence with ultrasound of the right breast and axilla.

## 2024-10-16 ENCOUNTER — HOSPITAL ENCOUNTER (OUTPATIENT)
Dept: RADIOLOGY | Facility: HOSPITAL | Age: 74
Discharge: HOME | End: 2024-10-16
Payer: MEDICARE

## 2024-10-16 DIAGNOSIS — C50.811 MALIGNANT NEOPLASM OF OVERLAPPING SITES OF RIGHT FEMALE BREAST, UNSPECIFIED ESTROGEN RECEPTOR STATUS: ICD-10-CM

## 2024-10-16 PROCEDURE — 76982 USE 1ST TARGET LESION: CPT | Mod: RT

## 2024-10-16 PROCEDURE — 76642 ULTRASOUND BREAST LIMITED: CPT | Mod: RT

## 2024-10-16 PROCEDURE — 76642 ULTRASOUND BREAST LIMITED: CPT | Mod: RIGHT SIDE | Performed by: RADIOLOGY

## 2024-10-16 PROCEDURE — 77061 BREAST TOMOSYNTHESIS UNI: CPT | Mod: RT

## 2024-10-16 PROCEDURE — G0279 TOMOSYNTHESIS, MAMMO: HCPCS | Mod: RIGHT SIDE | Performed by: RADIOLOGY

## 2024-10-16 PROCEDURE — 77065 DX MAMMO INCL CAD UNI: CPT | Mod: RIGHT SIDE | Performed by: RADIOLOGY

## 2024-10-17 ENCOUNTER — TELEPHONE (OUTPATIENT)
Dept: SURGICAL ONCOLOGY | Facility: CLINIC | Age: 74
End: 2024-10-17
Payer: MEDICARE

## 2024-10-17 NOTE — TELEPHONE ENCOUNTER
Result Communication    Resulted Orders   BI US breast limited right    Narrative    Interpreted By:  Denise Thakur,   STUDY:  BI MAMMO RIGHT DIAGNOSTIC TOMOSYNTHESIS; BI US BREAST LIMITED RIGHT;  10/16/2024 3:17 pm; 10/16/2024 4:12 pm      ACCESSION NUMBER(S):  [id number]; [id number]      ORDERING CLINICIAN:  MARIN POWELL GROSS      INDICATION:  Right breast lumpectomies and sentinel lymph node biopsy that showed  micrometastasis. Abnormal PET-CT.      COMPARISON:  PET-CT 09/30/2024; mammogram 07/01/2024, 05/09/2024, 04/17/2024      FINDINGS:  MAMMOGRAPHY: 2D and tomosynthesis images were reviewed at 1 mm slice  thickness.      Density:  The breasts are heterogeneously dense, which may obscure  small masses.      Interval postlumpectomy changes including parenchymal scarring and  surgical clips are seen in the superolateral right breast at middle  depth. A spiculated mass is identified in the right low axilla,  correlating with the previously described lymph node on PET-CT dated  09/30/2024. Diffuse right breast skin and trabecular thickening is  present, likely postoperative changes.      ULTRASOUND:  Targeted ultrasound examination with elastography of the right low  axilla demonstrates an irregular spiculated hypoechoic mass measuring  1.8 x 1.5 x 0.7 cm. It demonstrates no internal vascularity. It  demonstrates mild to moderate stiffness on the elastography. This  correlates with the spiculated mass in the low right axilla on  mammogram.      Ultrasound examination of the right lumpectomy bed demonstrates no  discrete mass or abnormality identified.      Ultrasound examination of the right axilla demonstrates 3  unremarkable lymph nodes.        Impression    Suspicious mass in the right low axilla.  Surgical consultation and  ultrasound guided core needle biopsy are recommended. Findings and  recommendations were discussed with the patient by Dr. Thakur.  A preprocedure form was  completed.      BI-RADS CATEGORY:      BI-RADS Category:  4 Suspicious.  Recommendation:  Surgical Consultation and Biopsy.  Recommended Date:  Immediate.  Laterality:  Right.      Method of Detection: Category N - Not detected by Screening, Patient  or Provider      For any future breast imaging appointments, please call [phone]  [phone]).          MACRO:  Critical Finding:  See findings. Notification was initiated on  10/16/2024 at 4:39 pm by  Denise Thakur.  (**-[id number]-**)  Instructions:  Surgical Consultation and Imaging Guided Biopsy.      Signed by: Denise Thakur 10/16/2024 4:39 PM  Dictation workstation:   [id number]       9:10 AM      Results were successfully communicated with the patient and they acknowledged their understanding.  Schedule right US core bx

## 2024-10-28 ENCOUNTER — HOSPITAL ENCOUNTER (OUTPATIENT)
Dept: RADIOLOGY | Facility: CLINIC | Age: 74
Discharge: HOME | End: 2024-10-28
Payer: MEDICARE

## 2024-10-28 DIAGNOSIS — N63.0 MASS OF BREAST: ICD-10-CM

## 2024-10-28 PROCEDURE — 19083 BX BREAST 1ST LESION US IMAG: CPT | Mod: RT

## 2024-10-28 PROCEDURE — 77065 DX MAMMO INCL CAD UNI: CPT

## 2024-10-28 PROCEDURE — C1819 TISSUE LOCALIZATION-EXCISION: HCPCS

## 2024-10-28 PROCEDURE — A4648 IMPLANTABLE TISSUE MARKER: HCPCS

## 2024-10-28 PROCEDURE — 2500000004 HC RX 250 GENERAL PHARMACY W/ HCPCS (ALT 636 FOR OP/ED): Performed by: STUDENT IN AN ORGANIZED HEALTH CARE EDUCATION/TRAINING PROGRAM

## 2024-10-28 PROCEDURE — 2720000007 HC OR 272 NO HCPCS

## 2024-10-28 ASSESSMENT — PAIN SCALES - GENERAL
PAINLEVEL_OUTOF10: 3
PAINLEVEL_OUTOF10: 0 - NO PAIN

## 2024-11-01 ENCOUNTER — HOSPITAL ENCOUNTER (OUTPATIENT)
Dept: RADIOLOGY | Facility: CLINIC | Age: 74
Discharge: HOME | End: 2024-11-01
Payer: MEDICARE

## 2024-11-01 ENCOUNTER — OFFICE VISIT (OUTPATIENT)
Dept: ORTHOPEDIC SURGERY | Facility: CLINIC | Age: 74
End: 2024-11-01
Payer: MEDICARE

## 2024-11-01 VITALS — HEIGHT: 62 IN | BODY MASS INDEX: 48.03 KG/M2 | WEIGHT: 261 LBS

## 2024-11-01 DIAGNOSIS — R52 PAIN: ICD-10-CM

## 2024-11-01 DIAGNOSIS — M25.561 CHRONIC PAIN OF BOTH KNEES: ICD-10-CM

## 2024-11-01 DIAGNOSIS — M25.562 CHRONIC PAIN OF BOTH KNEES: ICD-10-CM

## 2024-11-01 DIAGNOSIS — M25.512 LEFT SHOULDER PAIN, UNSPECIFIED CHRONICITY: Primary | ICD-10-CM

## 2024-11-01 DIAGNOSIS — G89.29 CHRONIC PAIN OF BOTH KNEES: ICD-10-CM

## 2024-11-01 DIAGNOSIS — M75.52 BURSITIS OF LEFT SHOULDER: ICD-10-CM

## 2024-11-01 DIAGNOSIS — M19.019 SHOULDER ARTHRITIS: ICD-10-CM

## 2024-11-01 DIAGNOSIS — M17.0 PRIMARY OSTEOARTHRITIS OF KNEES, BILATERAL: ICD-10-CM

## 2024-11-01 PROCEDURE — 73560 X-RAY EXAM OF KNEE 1 OR 2: CPT | Mod: LT

## 2024-11-01 PROCEDURE — 99213 OFFICE O/P EST LOW 20 MIN: CPT | Performed by: PHYSICIAN ASSISTANT

## 2024-11-01 ASSESSMENT — ENCOUNTER SYMPTOMS
DEPRESSION: 0
LOSS OF SENSATION IN FEET: 0
OCCASIONAL FEELINGS OF UNSTEADINESS: 0

## 2024-11-01 ASSESSMENT — LIFESTYLE VARIABLES
HOW OFTEN DURING THE LAST YEAR HAVE YOU BEEN UNABLE TO REMEMBER WHAT HAPPENED THE NIGHT BEFORE BECAUSE YOU HAD BEEN DRINKING: NEVER
SKIP TO QUESTIONS 9-10: 1
HOW OFTEN DURING THE LAST YEAR HAVE YOU HAD A FEELING OF GUILT OR REMORSE AFTER DRINKING: NEVER
HOW OFTEN DURING THE LAST YEAR HAVE YOU FOUND THAT YOU WERE NOT ABLE TO STOP DRINKING ONCE YOU HAD STARTED: NEVER
HOW OFTEN DURING THE LAST YEAR HAVE YOU NEEDED AN ALCOHOLIC DRINK FIRST THING IN THE MORNING TO GET YOURSELF GOING AFTER A NIGHT OF HEAVY DRINKING: NEVER
HOW MANY STANDARD DRINKS CONTAINING ALCOHOL DO YOU HAVE ON A TYPICAL DAY: PATIENT DOES NOT DRINK
AUDIT-C TOTAL SCORE: 0
HOW OFTEN DO YOU HAVE SIX OR MORE DRINKS ON ONE OCCASION: NEVER
AUDIT TOTAL SCORE: 0
HOW OFTEN DURING THE LAST YEAR HAVE YOU FAILED TO DO WHAT WAS NORMALLY EXPECTED FROM YOU BECAUSE OF DRINKING: NEVER
HOW OFTEN DO YOU HAVE A DRINK CONTAINING ALCOHOL: NEVER
HAS A RELATIVE, FRIEND, DOCTOR, OR ANOTHER HEALTH PROFESSIONAL EXPRESSED CONCERN ABOUT YOUR DRINKING OR SUGGESTED YOU CUT DOWN: NO
HAVE YOU OR SOMEONE ELSE BEEN INJURED AS A RESULT OF YOUR DRINKING: NO

## 2024-11-01 ASSESSMENT — PAIN DESCRIPTION - DESCRIPTORS: DESCRIPTORS: ACHING

## 2024-11-01 ASSESSMENT — PAIN - FUNCTIONAL ASSESSMENT: PAIN_FUNCTIONAL_ASSESSMENT: 0-10

## 2024-11-01 ASSESSMENT — PAIN SCALES - GENERAL
PAINLEVEL_OUTOF10: 9
PAINLEVEL_OUTOF10: 9

## 2024-11-01 ASSESSMENT — PATIENT HEALTH QUESTIONNAIRE - PHQ9
10. IF YOU CHECKED OFF ANY PROBLEMS, HOW DIFFICULT HAVE THESE PROBLEMS MADE IT FOR YOU TO DO YOUR WORK, TAKE CARE OF THINGS AT HOME, OR GET ALONG WITH OTHER PEOPLE: SOMEWHAT DIFFICULT
1. LITTLE INTEREST OR PLEASURE IN DOING THINGS: SEVERAL DAYS
SUM OF ALL RESPONSES TO PHQ9 QUESTIONS 1 AND 2: 2
2. FEELING DOWN, DEPRESSED OR HOPELESS: SEVERAL DAYS

## 2024-11-05 ENCOUNTER — TELEPHONE (OUTPATIENT)
Dept: SURGICAL ONCOLOGY | Facility: CLINIC | Age: 74
End: 2024-11-05
Payer: MEDICARE

## 2024-11-05 NOTE — TELEPHONE ENCOUNTER
"Result Communication    Resulted Orders   Surgical Pathology Exam   Result Value Ref Range    Case Report       Surgical Pathology                                Case: [id number]                                  Authorizing Provider:  Mabel Crenshaw MD            Collected:           10/28/2024 1430              Ordering Location:     [facility]       Received:            10/28/2024 1512                                     Center                                                                       Pathologist:           Edsras Reyna MD                                                           Specimen:    BREAST CORE BIOPSY RIGHT, Right Axilla Low                                                 FINAL DIAGNOSIS       A. Right axilla, low, core biopsy:  -- Fibroadipose tissue with fibrosis, hemorrhage, mild chronic inflammation and fat necrosis    Note: Immunohistochemical studies have been performed.  Pancytokeratin AE1/AE3 is negative.  Staining with beta-cetenin does not show nuclear immunoreactivity in spindle cells.    This case has been reviewed at the [facility] breast pathology consensus conference via Zoom on 11/5/24.  Attending: EDUARDO You DO, R. Khattab, MD, LILIANE Bernal MD              By the signature on this report, the individual or group listed as making the Final Interpretation/Diagnosis certifies that they have reviewed this case.       Clinical History       Ultrasound guided core biopsyof Right Axillary Mass Low  Mass of breast [N63.0]      Gross Description       A:  Received in formalin, labeled with the patient´s name and hospital number and \"right axilla low\", are multiple cylindrical segments of yellow-white fatty soft tissue aggregating to 1.8 x 0.8 x 0.3 cm.  The specimen is submitted in toto in one cassette.  DMB    Ischemia time: 10/28/24 1430  This specimen was placed into formalin at:10/28/24 no time provided on req      Disclaimer       One or more of the reagents used to perform " assays on this specimen MAY have contained components considered to be analyte specific reagents (ASR's).  ASR's have not been cleared or approved by the U.S. Food and Drug Administration.  These assays were developed and their performance characteristics determined by the Department of Pathology at [facility]. The FDA does not require this test to go through premarket FDA review. This test is used for clinical purposes. It should not be regarded as investigational or for research. This laboratory is certified under the Clinical Laboratory Improvement Amendments (CLIA) as qualified to perform high complexity clinical laboratory testing.  The assays were performed with appropriate positive and negative controls which stained appropriately.         1:23 PM      The patient was called with the pathology results.  A message was left on voicemail that the biopsy was benign.  She is to call if she has questions.

## 2024-11-06 ENCOUNTER — HOSPITAL ENCOUNTER (OUTPATIENT)
Dept: RADIATION ONCOLOGY | Facility: CLINIC | Age: 74
Setting detail: RADIATION/ONCOLOGY SERIES
Discharge: HOME | End: 2024-11-06
Payer: MEDICARE

## 2024-11-06 PROCEDURE — 77295 3-D RADIOTHERAPY PLAN: CPT | Performed by: STUDENT IN AN ORGANIZED HEALTH CARE EDUCATION/TRAINING PROGRAM

## 2024-11-06 PROCEDURE — 77334 RADIATION TREATMENT AID(S): CPT | Performed by: STUDENT IN AN ORGANIZED HEALTH CARE EDUCATION/TRAINING PROGRAM

## 2024-11-06 PROCEDURE — 77300 RADIATION THERAPY DOSE PLAN: CPT | Performed by: STUDENT IN AN ORGANIZED HEALTH CARE EDUCATION/TRAINING PROGRAM

## 2024-11-07 ENCOUNTER — HOSPITAL ENCOUNTER (OUTPATIENT)
Dept: RADIATION ONCOLOGY | Facility: HOSPITAL | Age: 74
Setting detail: RADIATION/ONCOLOGY SERIES
Discharge: HOME | End: 2024-11-07
Payer: MEDICARE

## 2024-11-14 ENCOUNTER — APPOINTMENT (OUTPATIENT)
Dept: RADIATION ONCOLOGY | Facility: CLINIC | Age: 74
End: 2024-11-14
Payer: MEDICARE

## 2024-11-15 ENCOUNTER — APPOINTMENT (OUTPATIENT)
Dept: RADIATION ONCOLOGY | Facility: CLINIC | Age: 74
End: 2024-11-15
Payer: MEDICARE

## 2024-11-18 ENCOUNTER — APPOINTMENT (OUTPATIENT)
Dept: RADIATION ONCOLOGY | Facility: CLINIC | Age: 74
End: 2024-11-18
Payer: MEDICARE

## 2024-11-19 ENCOUNTER — APPOINTMENT (OUTPATIENT)
Dept: RADIATION ONCOLOGY | Facility: CLINIC | Age: 74
End: 2024-11-19
Payer: MEDICARE

## 2024-11-20 ENCOUNTER — APPOINTMENT (OUTPATIENT)
Dept: RADIATION ONCOLOGY | Facility: CLINIC | Age: 74
End: 2024-11-20
Payer: MEDICARE

## 2024-11-20 DIAGNOSIS — E66.9 TYPE 2 DIABETES MELLITUS WITH OBESITY (MULTI): ICD-10-CM

## 2024-11-20 DIAGNOSIS — E11.69 TYPE 2 DIABETES MELLITUS WITH OBESITY (MULTI): ICD-10-CM

## 2024-11-20 RX ORDER — INSULIN GLARGINE 100 [IU]/ML
25 INJECTION, SOLUTION SUBCUTANEOUS EVERY MORNING
Qty: 9 ML | Refills: 9 | Status: SHIPPED | OUTPATIENT
Start: 2024-11-20

## 2024-11-21 ENCOUNTER — APPOINTMENT (OUTPATIENT)
Dept: RADIATION ONCOLOGY | Facility: CLINIC | Age: 74
End: 2024-11-21
Payer: MEDICARE

## 2024-11-22 ENCOUNTER — HOSPITAL ENCOUNTER (OUTPATIENT)
Dept: RADIATION ONCOLOGY | Facility: CLINIC | Age: 74
Setting detail: RADIATION/ONCOLOGY SERIES
Discharge: HOME | End: 2024-11-22
Payer: MEDICARE

## 2024-11-22 ENCOUNTER — HOSPITAL ENCOUNTER (OUTPATIENT)
Dept: RADIATION ONCOLOGY | Facility: CLINIC | Age: 74
Setting detail: RADIATION/ONCOLOGY SERIES
End: 2024-11-22
Payer: MEDICARE

## 2024-11-22 ENCOUNTER — HOSPITAL ENCOUNTER (OUTPATIENT)
Dept: RADIOLOGY | Facility: EXTERNAL LOCATION | Age: 74
Discharge: HOME | End: 2024-11-22

## 2024-11-22 ENCOUNTER — APPOINTMENT (OUTPATIENT)
Dept: RADIATION ONCOLOGY | Facility: CLINIC | Age: 74
End: 2024-11-22
Payer: MEDICARE

## 2024-11-22 VITALS
RESPIRATION RATE: 18 BRPM | SYSTOLIC BLOOD PRESSURE: 141 MMHG | DIASTOLIC BLOOD PRESSURE: 71 MMHG | BODY MASS INDEX: 47.66 KG/M2 | OXYGEN SATURATION: 100 % | HEART RATE: 70 BPM | TEMPERATURE: 95.9 F | WEIGHT: 260.58 LBS

## 2024-11-22 DIAGNOSIS — C50.811 MALIGNANT NEOPLASM OF OVERLAPPING SITES OF RIGHT FEMALE BREAST: ICD-10-CM

## 2024-11-22 DIAGNOSIS — Z51.0 ENCOUNTER FOR ANTINEOPLASTIC RADIATION THERAPY: ICD-10-CM

## 2024-11-22 DIAGNOSIS — C50.411 MALIGNANT NEOPLASM OF UPPER-OUTER QUADRANT OF RIGHT FEMALE BREAST: ICD-10-CM

## 2024-11-22 DIAGNOSIS — C50.811 MALIGNANT NEOPLASM OF OVERLAPPING SITES OF RIGHT FEMALE BREAST, UNSPECIFIED ESTROGEN RECEPTOR STATUS: ICD-10-CM

## 2024-11-22 LAB
RAD ONC MSQ ACTUAL FRACTIONS DELIVERED: 1
RAD ONC MSQ ACTUAL SESSION DELIVERED DOSE: 266 CGRAY
RAD ONC MSQ ACTUAL TOTAL DOSE: 266 CGRAY
RAD ONC MSQ ELAPSED DAYS: 0
RAD ONC MSQ LAST DATE: NORMAL
RAD ONC MSQ PRESCRIBED FRACTIONAL DOSE: 266 CGRAY
RAD ONC MSQ PRESCRIBED NUMBER OF FRACTIONS: 16
RAD ONC MSQ PRESCRIBED TECHNIQUE: NORMAL
RAD ONC MSQ PRESCRIBED TOTAL DOSE: 4256 CGRAY
RAD ONC MSQ PRESCRIPTION PATTERN COMMENT: NORMAL
RAD ONC MSQ START DATE: NORMAL
RAD ONC MSQ TREATMENT COURSE NUMBER: 1
RAD ONC MSQ TREATMENT SITE: NORMAL

## 2024-11-22 PROCEDURE — 77412 RADIATION TX DELIVERY LVL 3: CPT | Performed by: STUDENT IN AN ORGANIZED HEALTH CARE EDUCATION/TRAINING PROGRAM

## 2024-11-22 PROCEDURE — 77280 THER RAD SIMULAJ FIELD SMPL: CPT | Performed by: STUDENT IN AN ORGANIZED HEALTH CARE EDUCATION/TRAINING PROGRAM

## 2024-11-22 ASSESSMENT — PAIN SCALES - GENERAL
PAINLEVEL_OUTOF10: 10-WORST PAIN EVER
PAINLEVEL_OUTOF10: 10 - WORST POSSIBLE PAIN

## 2024-11-22 ASSESSMENT — PAIN - FUNCTIONAL ASSESSMENT: PAIN_FUNCTIONAL_ASSESSMENT: 0-10

## 2024-11-22 NOTE — PROGRESS NOTES
Patient seen for CT/Simulation appointment, vitals taken, allergies and screenings reviewed. Plan for today and treatment going forward reviewed, questions answered. Patient taken to changing area, RT notified.

## 2024-11-24 ENCOUNTER — APPOINTMENT (OUTPATIENT)
Dept: RADIATION ONCOLOGY | Facility: CLINIC | Age: 74
End: 2024-11-24
Payer: MEDICARE

## 2024-11-24 ENCOUNTER — HOSPITAL ENCOUNTER (OUTPATIENT)
Dept: RADIATION ONCOLOGY | Facility: CLINIC | Age: 74
Setting detail: RADIATION/ONCOLOGY SERIES
Discharge: HOME | End: 2024-11-24
Payer: MEDICARE

## 2024-11-24 DIAGNOSIS — Z51.0 ENCOUNTER FOR ANTINEOPLASTIC RADIATION THERAPY: ICD-10-CM

## 2024-11-24 DIAGNOSIS — C50.811 MALIGNANT NEOPLASM OF OVERLAPPING SITES OF RIGHT FEMALE BREAST: ICD-10-CM

## 2024-11-24 DIAGNOSIS — C50.411 MALIGNANT NEOPLASM OF UPPER-OUTER QUADRANT OF RIGHT FEMALE BREAST: ICD-10-CM

## 2024-11-24 LAB
RAD ONC MSQ ACTUAL FRACTIONS DELIVERED: 2
RAD ONC MSQ ACTUAL SESSION DELIVERED DOSE: 266 CGRAY
RAD ONC MSQ ACTUAL TOTAL DOSE: 532 CGRAY
RAD ONC MSQ ELAPSED DAYS: 2
RAD ONC MSQ LAST DATE: NORMAL
RAD ONC MSQ PRESCRIBED FRACTIONAL DOSE: 266 CGRAY
RAD ONC MSQ PRESCRIBED NUMBER OF FRACTIONS: 16
RAD ONC MSQ PRESCRIBED TECHNIQUE: NORMAL
RAD ONC MSQ PRESCRIBED TOTAL DOSE: 4256 CGRAY
RAD ONC MSQ PRESCRIPTION PATTERN COMMENT: NORMAL
RAD ONC MSQ START DATE: NORMAL
RAD ONC MSQ TREATMENT COURSE NUMBER: 1
RAD ONC MSQ TREATMENT SITE: NORMAL

## 2024-11-24 PROCEDURE — 77412 RADIATION TX DELIVERY LVL 3: CPT | Performed by: STUDENT IN AN ORGANIZED HEALTH CARE EDUCATION/TRAINING PROGRAM

## 2024-11-25 ENCOUNTER — HOSPITAL ENCOUNTER (OUTPATIENT)
Dept: RADIATION ONCOLOGY | Facility: CLINIC | Age: 74
Setting detail: RADIATION/ONCOLOGY SERIES
Discharge: HOME | End: 2024-11-25
Payer: MEDICARE

## 2024-11-25 ENCOUNTER — APPOINTMENT (OUTPATIENT)
Dept: NEUROLOGY | Facility: CLINIC | Age: 74
End: 2024-11-25
Payer: MEDICARE

## 2024-11-25 ENCOUNTER — APPOINTMENT (OUTPATIENT)
Dept: RADIATION ONCOLOGY | Facility: CLINIC | Age: 74
End: 2024-11-25
Payer: MEDICARE

## 2024-11-25 VITALS
BODY MASS INDEX: 47.48 KG/M2 | HEART RATE: 66 BPM | HEIGHT: 62 IN | WEIGHT: 258 LBS | DIASTOLIC BLOOD PRESSURE: 78 MMHG | SYSTOLIC BLOOD PRESSURE: 136 MMHG

## 2024-11-25 DIAGNOSIS — G20.A1 PARKINSON'S DISEASE, UNSPECIFIED WHETHER DYSKINESIA PRESENT, UNSPECIFIED WHETHER MANIFESTATIONS FLUCTUATE: ICD-10-CM

## 2024-11-25 DIAGNOSIS — G20.C PARKINSONISM, UNSPECIFIED PARKINSONISM TYPE (MULTI): ICD-10-CM

## 2024-11-25 DIAGNOSIS — H81.10 BPPV (BENIGN PAROXYSMAL POSITIONAL VERTIGO), UNSPECIFIED LATERALITY: Primary | ICD-10-CM

## 2024-11-25 DIAGNOSIS — C50.411 MALIGNANT NEOPLASM OF UPPER-OUTER QUADRANT OF RIGHT FEMALE BREAST: ICD-10-CM

## 2024-11-25 DIAGNOSIS — C50.811 MALIGNANT NEOPLASM OF OVERLAPPING SITES OF RIGHT FEMALE BREAST: ICD-10-CM

## 2024-11-25 DIAGNOSIS — Z51.0 ENCOUNTER FOR ANTINEOPLASTIC RADIATION THERAPY: ICD-10-CM

## 2024-11-25 LAB
RAD ONC MSQ ACTUAL FRACTIONS DELIVERED: 3
RAD ONC MSQ ACTUAL SESSION DELIVERED DOSE: 266 CGRAY
RAD ONC MSQ ACTUAL TOTAL DOSE: 798 CGRAY
RAD ONC MSQ ELAPSED DAYS: 3
RAD ONC MSQ LAST DATE: NORMAL
RAD ONC MSQ PRESCRIBED FRACTIONAL DOSE: 266 CGRAY
RAD ONC MSQ PRESCRIBED NUMBER OF FRACTIONS: 16
RAD ONC MSQ PRESCRIBED TECHNIQUE: NORMAL
RAD ONC MSQ PRESCRIBED TOTAL DOSE: 4256 CGRAY
RAD ONC MSQ PRESCRIPTION PATTERN COMMENT: NORMAL
RAD ONC MSQ START DATE: NORMAL
RAD ONC MSQ TREATMENT COURSE NUMBER: 1
RAD ONC MSQ TREATMENT SITE: NORMAL

## 2024-11-25 PROCEDURE — 77412 RADIATION TX DELIVERY LVL 3: CPT | Performed by: STUDENT IN AN ORGANIZED HEALTH CARE EDUCATION/TRAINING PROGRAM

## 2024-11-25 RX ORDER — CARBIDOPA AND LEVODOPA 25; 100 MG/1; MG/1
1 TABLET ORAL 3 TIMES DAILY
Qty: 90 TABLET | Refills: 5 | Status: SHIPPED | OUTPATIENT
Start: 2024-11-25 | End: 2025-11-25

## 2024-11-25 NOTE — PATIENT INSTRUCTIONS
I am sorry to hear that you will be treated for breast and later ovarian cancer.  I want you to resume taking the carbidopa-levadopa as written.  Consider calling our office when you have more freedom to arrange for vestibular rehab to help with your likely benign paroxysmal positional vertigo.  Follow up in 6 months.

## 2024-11-25 NOTE — PROGRESS NOTES
Subjective   Dina Mullins is a 74 y.o.   female.  HPI  This is a 74 year old woman with a history of parkinsonism, has been taking carbidopa-levadopa  mg twice a day- ran out of it 2 months ago.  Her upper extremities can shake.  Objective   Neurological Exam  Alert with hypomimia  Normal muscle tone, no tremor, no bradykinesia  Gait: slow standing up, steps are slow.  Physical Exam  I personally reviewed laboratory, radiographic, and medical studies which were pertinent for nothing.    Assessment/Plan

## 2024-11-26 ENCOUNTER — HOSPITAL ENCOUNTER (OUTPATIENT)
Dept: RADIATION ONCOLOGY | Facility: CLINIC | Age: 74
Setting detail: RADIATION/ONCOLOGY SERIES
Discharge: HOME | End: 2024-11-26
Payer: MEDICARE

## 2024-11-26 ENCOUNTER — APPOINTMENT (OUTPATIENT)
Dept: RADIATION ONCOLOGY | Facility: CLINIC | Age: 74
End: 2024-11-26
Payer: MEDICARE

## 2024-11-26 DIAGNOSIS — C50.811 MALIGNANT NEOPLASM OF OVERLAPPING SITES OF RIGHT FEMALE BREAST: ICD-10-CM

## 2024-11-26 DIAGNOSIS — C50.411 MALIGNANT NEOPLASM OF UPPER-OUTER QUADRANT OF RIGHT FEMALE BREAST: ICD-10-CM

## 2024-11-26 DIAGNOSIS — Z51.0 ENCOUNTER FOR ANTINEOPLASTIC RADIATION THERAPY: ICD-10-CM

## 2024-11-26 LAB
RAD ONC MSQ ACTUAL FRACTIONS DELIVERED: 4
RAD ONC MSQ ACTUAL SESSION DELIVERED DOSE: 266 CGRAY
RAD ONC MSQ ACTUAL TOTAL DOSE: 1064 CGRAY
RAD ONC MSQ ELAPSED DAYS: 4
RAD ONC MSQ LAST DATE: NORMAL
RAD ONC MSQ PRESCRIBED FRACTIONAL DOSE: 266 CGRAY
RAD ONC MSQ PRESCRIBED NUMBER OF FRACTIONS: 16
RAD ONC MSQ PRESCRIBED TECHNIQUE: NORMAL
RAD ONC MSQ PRESCRIBED TOTAL DOSE: 4256 CGRAY
RAD ONC MSQ PRESCRIPTION PATTERN COMMENT: NORMAL
RAD ONC MSQ START DATE: NORMAL
RAD ONC MSQ TREATMENT COURSE NUMBER: 1
RAD ONC MSQ TREATMENT SITE: NORMAL

## 2024-11-26 PROCEDURE — 77336 RADIATION PHYSICS CONSULT: CPT | Performed by: STUDENT IN AN ORGANIZED HEALTH CARE EDUCATION/TRAINING PROGRAM

## 2024-11-26 PROCEDURE — 77412 RADIATION TX DELIVERY LVL 3: CPT | Performed by: STUDENT IN AN ORGANIZED HEALTH CARE EDUCATION/TRAINING PROGRAM

## 2024-11-27 ENCOUNTER — RADIATION ONCOLOGY OTV (OUTPATIENT)
Dept: RADIATION ONCOLOGY | Facility: CLINIC | Age: 74
End: 2024-11-27
Payer: MEDICARE

## 2024-11-27 ENCOUNTER — APPOINTMENT (OUTPATIENT)
Dept: RADIATION ONCOLOGY | Facility: CLINIC | Age: 74
End: 2024-11-27
Payer: MEDICARE

## 2024-11-27 ENCOUNTER — HOSPITAL ENCOUNTER (OUTPATIENT)
Dept: RADIATION ONCOLOGY | Facility: CLINIC | Age: 74
Setting detail: RADIATION/ONCOLOGY SERIES
Discharge: HOME | End: 2024-11-27
Payer: MEDICARE

## 2024-11-27 VITALS
WEIGHT: 257.94 LBS | SYSTOLIC BLOOD PRESSURE: 145 MMHG | RESPIRATION RATE: 18 BRPM | HEART RATE: 112 BPM | OXYGEN SATURATION: 99 % | BODY MASS INDEX: 47.18 KG/M2 | DIASTOLIC BLOOD PRESSURE: 68 MMHG | TEMPERATURE: 97.9 F

## 2024-11-27 DIAGNOSIS — Z51.0 ENCOUNTER FOR ANTINEOPLASTIC RADIATION THERAPY: ICD-10-CM

## 2024-11-27 DIAGNOSIS — C50.411 MALIGNANT NEOPLASM OF UPPER-OUTER QUADRANT OF RIGHT FEMALE BREAST: ICD-10-CM

## 2024-11-27 DIAGNOSIS — C50.811 MALIGNANT NEOPLASM OF OVERLAPPING SITES OF RIGHT FEMALE BREAST: ICD-10-CM

## 2024-11-27 LAB
RAD ONC MSQ ACTUAL FRACTIONS DELIVERED: 5
RAD ONC MSQ ACTUAL SESSION DELIVERED DOSE: 266 CGRAY
RAD ONC MSQ ACTUAL TOTAL DOSE: 1330 CGRAY
RAD ONC MSQ ELAPSED DAYS: 5
RAD ONC MSQ LAST DATE: NORMAL
RAD ONC MSQ PRESCRIBED FRACTIONAL DOSE: 266 CGRAY
RAD ONC MSQ PRESCRIBED NUMBER OF FRACTIONS: 16
RAD ONC MSQ PRESCRIBED TECHNIQUE: NORMAL
RAD ONC MSQ PRESCRIBED TOTAL DOSE: 4256 CGRAY
RAD ONC MSQ PRESCRIPTION PATTERN COMMENT: NORMAL
RAD ONC MSQ START DATE: NORMAL
RAD ONC MSQ TREATMENT COURSE NUMBER: 1
RAD ONC MSQ TREATMENT SITE: NORMAL

## 2024-11-27 PROCEDURE — 77412 RADIATION TX DELIVERY LVL 3: CPT | Performed by: STUDENT IN AN ORGANIZED HEALTH CARE EDUCATION/TRAINING PROGRAM

## 2024-11-27 ASSESSMENT — ENCOUNTER SYMPTOMS
LOSS OF SENSATION IN FEET: 0
OCCASIONAL FEELINGS OF UNSTEADINESS: 0

## 2024-11-27 ASSESSMENT — COLUMBIA-SUICIDE SEVERITY RATING SCALE - C-SSRS
2. HAVE YOU ACTUALLY HAD ANY THOUGHTS OF KILLING YOURSELF?: NO
6. HAVE YOU EVER DONE ANYTHING, STARTED TO DO ANYTHING, OR PREPARED TO DO ANYTHING TO END YOUR LIFE?: NO
1. IN THE PAST MONTH, HAVE YOU WISHED YOU WERE DEAD OR WISHED YOU COULD GO TO SLEEP AND NOT WAKE UP?: NO

## 2024-11-27 ASSESSMENT — PAIN SCALES - GENERAL: PAINLEVEL_OUTOF10: 0-NO PAIN

## 2024-11-27 NOTE — PROGRESS NOTES
Radiation Oncology On Treatment Visit    Patient Name:  Dina Mullins  MRN:  [id number]  :  1950    Referring Provider: No ref. provider found  Primary Care Provider: Ken Saleh PA-C  Care Team: Patient Care Team:  Ken Saleh PA-C as PCP - General (Internal Medicine)  DEN Roach-CNP as PCP - [employer] PCP  Maine Birch MD as Consulting Physician (Hematology and Oncology)  Sherron Taylor RN as Registered Nurse (Hematology and Oncology)  Mei Black MD as Consulting Physician (Obstetrics and Gynecology)    Date of Service: 2024     Diagnosis:   Specialty Problems          Radiation Oncology Problems    Malignant neoplasm of overlapping sites of right female breast        Endometrial cancer (Multi)        Malignant neoplasm of overlapping sites of right female breast, unspecified estrogen receptor status         Treatment Summary:  3D CRT: Right Breast with lymph nodes    Treatment Period Technique Fraction Dose Fractions Total Dose   Course 1 2024-2024  (days elapsed: 5)         R_Breast_Axilla 2024-2024 3D 266 / 266 cGy  1330 / 4,256 cGy     SUBJECTIVE: The patient has noted left distal arm and elbow issues leading to weakness and discomfort.  Low energy improved with rest.  Anxiety and low mood. Noted right breast twinges.  The patient will be planned for uterine surgery in 2025.     OBJECTIVE:   Vital Signs:  /68   Pulse (!) 112   Temp 36.6 °C (97.9 °F) (Temporal)   Resp 18   Wt 117 kg (257 lb 15 oz)   SpO2 99%   BMI 47.18 kg/m²     Other Pertinent Findings:     Toxicity Assessment          2024    14:30   Toxicity Assessment   Treatment Site Breast   Anorexia Grade 0   Anxiety Grade 1   Dehydration Grade 1   Depression Grade 1   Dermatitis Radiation Grade 0   Diarrhea Grade 0   Fatigue Grade 1   Fibrosis Deep Connective Tissue Grade 0   Fracture Grade 0   Nausea Grade 0   Pain Grade 0   Treatment  Related Secondary Malignancy Grade 0   Tumor Pain Grade 0   Vomiting Grade 0   Abdominal Pain Grade 0   Dysphagia Grade 0   Esophagitis Grade 0   Gastric Hemorrhage Grade 0   Mucositis Oral Grade 0   Dry Mouth Grade 0   Breast Infection Grade 0   Seroma Grade 0   Joint Range of Motion Decreased Grade 0   Joint Range of Motion Decreased Lumbar Spine Grade 0   Brachial Plexopathy Grade 0   Breast Atrophy Grade 0   Breast Pain Grade 1       twinges   Nipple Deformity Grade 0   Pneumonitis Grade 0   Edema Limbs Grade 0   Lymphedema Grade 0   Thromboembolic Event Grade 0   Hot Flashes Grade 0          Concurrent systemic therapy: None    Labs: None    Exam: Grade 0 radiation dermatitis of the right breast.     Assessment / Plan:  The patient is tolerating radiation therapy as anticipated.  Continue per current treatment plan.       Therapies: Continue Udderly smooth to the right breast.  Discussed if her symptoms of her left arm do not improve that x-ray films of the left elbow may be an initial first step, encouraged the patient to reach out to her orthopedic and neurologist for possible workup.    Side effects reviewed with patient. Images, chart and labs reviewed.    Bib Buitrago MD

## 2024-11-29 ENCOUNTER — APPOINTMENT (OUTPATIENT)
Dept: RADIATION ONCOLOGY | Facility: CLINIC | Age: 74
End: 2024-11-29
Payer: MEDICARE

## 2024-12-02 ENCOUNTER — APPOINTMENT (OUTPATIENT)
Dept: RADIATION ONCOLOGY | Facility: CLINIC | Age: 74
End: 2024-12-02
Payer: MEDICARE

## 2024-12-03 ENCOUNTER — HOSPITAL ENCOUNTER (OUTPATIENT)
Dept: RADIATION ONCOLOGY | Facility: CLINIC | Age: 74
Setting detail: RADIATION/ONCOLOGY SERIES
Discharge: HOME | End: 2024-12-03
Payer: MEDICARE

## 2024-12-03 ENCOUNTER — APPOINTMENT (OUTPATIENT)
Dept: RADIATION ONCOLOGY | Facility: CLINIC | Age: 74
End: 2024-12-03
Payer: MEDICARE

## 2024-12-03 DIAGNOSIS — Z51.0 ENCOUNTER FOR ANTINEOPLASTIC RADIATION THERAPY: ICD-10-CM

## 2024-12-03 DIAGNOSIS — C50.811 MALIGNANT NEOPLASM OF OVERLAPPING SITES OF RIGHT FEMALE BREAST: ICD-10-CM

## 2024-12-03 DIAGNOSIS — C50.411 MALIGNANT NEOPLASM OF UPPER-OUTER QUADRANT OF RIGHT FEMALE BREAST: ICD-10-CM

## 2024-12-03 LAB
RAD ONC MSQ ACTUAL FRACTIONS DELIVERED: 6
RAD ONC MSQ ACTUAL SESSION DELIVERED DOSE: 215 CGRAY
RAD ONC MSQ ACTUAL TOTAL DOSE: 1545 CGRAY
RAD ONC MSQ ELAPSED DAYS: 11
RAD ONC MSQ LAST DATE: NORMAL
RAD ONC MSQ PRESCRIBED FRACTIONAL DOSE: 266 CGRAY
RAD ONC MSQ PRESCRIBED NUMBER OF FRACTIONS: 16
RAD ONC MSQ PRESCRIBED TECHNIQUE: NORMAL
RAD ONC MSQ PRESCRIBED TOTAL DOSE: 4256 CGRAY
RAD ONC MSQ PRESCRIPTION PATTERN COMMENT: NORMAL
RAD ONC MSQ START DATE: NORMAL
RAD ONC MSQ TREATMENT COURSE NUMBER: 1
RAD ONC MSQ TREATMENT SITE: NORMAL

## 2024-12-03 PROCEDURE — 77336 RADIATION PHYSICS CONSULT: CPT | Performed by: STUDENT IN AN ORGANIZED HEALTH CARE EDUCATION/TRAINING PROGRAM

## 2024-12-03 PROCEDURE — 77412 RADIATION TX DELIVERY LVL 3: CPT | Performed by: STUDENT IN AN ORGANIZED HEALTH CARE EDUCATION/TRAINING PROGRAM

## 2024-12-03 PROCEDURE — 77417 THER RADIOLOGY PORT IMAGE(S): CPT | Performed by: STUDENT IN AN ORGANIZED HEALTH CARE EDUCATION/TRAINING PROGRAM

## 2024-12-04 ENCOUNTER — APPOINTMENT (OUTPATIENT)
Dept: RADIATION ONCOLOGY | Facility: CLINIC | Age: 74
End: 2024-12-04
Payer: MEDICARE

## 2024-12-05 ENCOUNTER — LAB (OUTPATIENT)
Dept: LAB | Facility: CLINIC | Age: 74
End: 2024-12-05
Payer: MEDICARE

## 2024-12-05 ENCOUNTER — RADIATION ONCOLOGY OTV (OUTPATIENT)
Dept: RADIATION ONCOLOGY | Facility: CLINIC | Age: 74
End: 2024-12-05

## 2024-12-05 ENCOUNTER — APPOINTMENT (OUTPATIENT)
Dept: RADIATION ONCOLOGY | Facility: CLINIC | Age: 74
End: 2024-12-05
Payer: MEDICARE

## 2024-12-05 ENCOUNTER — HOSPITAL ENCOUNTER (OUTPATIENT)
Dept: RADIATION ONCOLOGY | Facility: CLINIC | Age: 74
Setting detail: RADIATION/ONCOLOGY SERIES
Discharge: HOME | End: 2024-12-05
Payer: MEDICARE

## 2024-12-05 ENCOUNTER — OFFICE VISIT (OUTPATIENT)
Dept: GYNECOLOGIC ONCOLOGY | Facility: CLINIC | Age: 74
End: 2024-12-05
Payer: MEDICARE

## 2024-12-05 VITALS
OXYGEN SATURATION: 99 % | BODY MASS INDEX: 46.77 KG/M2 | TEMPERATURE: 97.9 F | SYSTOLIC BLOOD PRESSURE: 160 MMHG | DIASTOLIC BLOOD PRESSURE: 71 MMHG | RESPIRATION RATE: 16 BRPM | HEART RATE: 69 BPM | WEIGHT: 255.73 LBS

## 2024-12-05 VITALS
TEMPERATURE: 98.1 F | SYSTOLIC BLOOD PRESSURE: 142 MMHG | OXYGEN SATURATION: 98 % | BODY MASS INDEX: 46.65 KG/M2 | WEIGHT: 255.07 LBS | DIASTOLIC BLOOD PRESSURE: 77 MMHG | HEART RATE: 71 BPM | RESPIRATION RATE: 18 BRPM

## 2024-12-05 DIAGNOSIS — C54.1 ENDOMETRIAL CANCER (MULTI): Primary | ICD-10-CM

## 2024-12-05 DIAGNOSIS — C50.411 MALIGNANT NEOPLASM OF UPPER-OUTER QUADRANT OF RIGHT FEMALE BREAST: ICD-10-CM

## 2024-12-05 DIAGNOSIS — C50.811 MALIGNANT NEOPLASM OF OVERLAPPING SITES OF RIGHT FEMALE BREAST: ICD-10-CM

## 2024-12-05 DIAGNOSIS — C54.1 ENDOMETRIAL CANCER (MULTI): ICD-10-CM

## 2024-12-05 DIAGNOSIS — Z51.0 ENCOUNTER FOR ANTINEOPLASTIC RADIATION THERAPY: ICD-10-CM

## 2024-12-05 DIAGNOSIS — N95.0 POST-MENOPAUSAL BLEEDING: ICD-10-CM

## 2024-12-05 LAB
BASOPHILS # BLD AUTO: 0.01 X10*3/UL (ref 0–0.1)
BASOPHILS NFR BLD AUTO: 0.2 %
EOSINOPHIL # BLD AUTO: 0.07 X10*3/UL (ref 0–0.4)
EOSINOPHIL NFR BLD AUTO: 1.4 %
ERYTHROCYTE [DISTWIDTH] IN BLOOD BY AUTOMATED COUNT: 14 % (ref 11.5–14.5)
HCT VFR BLD AUTO: 36.1 % (ref 36–46)
HGB BLD-MCNC: 11.6 G/DL (ref 12–16)
IMM GRANULOCYTES # BLD AUTO: 0.03 X10*3/UL (ref 0–0.5)
IMM GRANULOCYTES NFR BLD AUTO: 0.6 % (ref 0–0.9)
LYMPHOCYTES # BLD AUTO: 0.7 X10*3/UL (ref 0.8–3)
LYMPHOCYTES NFR BLD AUTO: 13.8 %
MCH RBC QN AUTO: 29.4 PG (ref 26–34)
MCHC RBC AUTO-ENTMCNC: 32.1 G/DL (ref 32–36)
MCV RBC AUTO: 92 FL (ref 80–100)
MONOCYTES # BLD AUTO: 0.29 X10*3/UL (ref 0.05–0.8)
MONOCYTES NFR BLD AUTO: 5.7 %
NEUTROPHILS # BLD AUTO: 3.99 X10*3/UL (ref 1.6–5.5)
NEUTROPHILS NFR BLD AUTO: 78.3 %
NRBC BLD-RTO: 0 /100 WBCS (ref 0–0)
PLATELET # BLD AUTO: 206 X10*3/UL (ref 150–450)
RAD ONC MSQ ACTUAL FRACTIONS DELIVERED: 7
RAD ONC MSQ ACTUAL SESSION DELIVERED DOSE: 266 CGRAY
RAD ONC MSQ ACTUAL TOTAL DOSE: 1862 CGRAY
RAD ONC MSQ ELAPSED DAYS: 13
RAD ONC MSQ LAST DATE: NORMAL
RAD ONC MSQ PRESCRIBED FRACTIONAL DOSE: 266 CGRAY
RAD ONC MSQ PRESCRIBED NUMBER OF FRACTIONS: 16
RAD ONC MSQ PRESCRIBED TECHNIQUE: NORMAL
RAD ONC MSQ PRESCRIBED TOTAL DOSE: 4256 CGRAY
RAD ONC MSQ PRESCRIPTION PATTERN COMMENT: NORMAL
RAD ONC MSQ START DATE: NORMAL
RAD ONC MSQ TREATMENT COURSE NUMBER: 1
RAD ONC MSQ TREATMENT SITE: NORMAL
RBC # BLD AUTO: 3.94 X10*6/UL (ref 4–5.2)
WBC # BLD AUTO: 5.1 X10*3/UL (ref 4.4–11.3)

## 2024-12-05 PROCEDURE — 77412 RADIATION TX DELIVERY LVL 3: CPT | Performed by: STUDENT IN AN ORGANIZED HEALTH CARE EDUCATION/TRAINING PROGRAM

## 2024-12-05 PROCEDURE — 36415 COLL VENOUS BLD VENIPUNCTURE: CPT

## 2024-12-05 PROCEDURE — 3048F LDL-C <100 MG/DL: CPT | Performed by: STUDENT IN AN ORGANIZED HEALTH CARE EDUCATION/TRAINING PROGRAM

## 2024-12-05 PROCEDURE — 1126F AMNT PAIN NOTED NONE PRSNT: CPT | Performed by: STUDENT IN AN ORGANIZED HEALTH CARE EDUCATION/TRAINING PROGRAM

## 2024-12-05 PROCEDURE — 1157F ADVNC CARE PLAN IN RCRD: CPT | Performed by: STUDENT IN AN ORGANIZED HEALTH CARE EDUCATION/TRAINING PROGRAM

## 2024-12-05 PROCEDURE — 3078F DIAST BP <80 MM HG: CPT | Performed by: STUDENT IN AN ORGANIZED HEALTH CARE EDUCATION/TRAINING PROGRAM

## 2024-12-05 PROCEDURE — 1160F RVW MEDS BY RX/DR IN RCRD: CPT | Performed by: STUDENT IN AN ORGANIZED HEALTH CARE EDUCATION/TRAINING PROGRAM

## 2024-12-05 PROCEDURE — 99215 OFFICE O/P EST HI 40 MIN: CPT | Performed by: STUDENT IN AN ORGANIZED HEALTH CARE EDUCATION/TRAINING PROGRAM

## 2024-12-05 PROCEDURE — 1036F TOBACCO NON-USER: CPT | Performed by: STUDENT IN AN ORGANIZED HEALTH CARE EDUCATION/TRAINING PROGRAM

## 2024-12-05 PROCEDURE — 3077F SYST BP >= 140 MM HG: CPT | Performed by: STUDENT IN AN ORGANIZED HEALTH CARE EDUCATION/TRAINING PROGRAM

## 2024-12-05 PROCEDURE — 3051F HG A1C>EQUAL 7.0%<8.0%: CPT | Performed by: STUDENT IN AN ORGANIZED HEALTH CARE EDUCATION/TRAINING PROGRAM

## 2024-12-05 PROCEDURE — 4010F ACE/ARB THERAPY RXD/TAKEN: CPT | Performed by: STUDENT IN AN ORGANIZED HEALTH CARE EDUCATION/TRAINING PROGRAM

## 2024-12-05 PROCEDURE — 85025 COMPLETE CBC W/AUTO DIFF WBC: CPT

## 2024-12-05 PROCEDURE — 1159F MED LIST DOCD IN RCRD: CPT | Performed by: STUDENT IN AN ORGANIZED HEALTH CARE EDUCATION/TRAINING PROGRAM

## 2024-12-05 ASSESSMENT — ENCOUNTER SYMPTOMS
LOSS OF SENSATION IN FEET: 0
OCCASIONAL FEELINGS OF UNSTEADINESS: 0
DEPRESSION: 0

## 2024-12-05 ASSESSMENT — PAIN SCALES - GENERAL
PAINLEVEL_OUTOF10: 0-NO PAIN
PAINLEVEL_OUTOF10: 0-NO PAIN

## 2024-12-05 NOTE — PROGRESS NOTES
Patient ID: Dina Mullins is a 74 y.o. female.  Referring Physician: No referring provider defined for this encounter.  Primary Care Provider: Ken Saleh PA-C    Subjective    Patient presenting for treatment planning discussion - delay in endometrial cancer treatment deferred to high risk synchronous primary breast cancer diagnosis, s/p breast biopsy in postoperative setting for PET-avid lymphadenopathy consistent with fibrosis/postoperative changes related to recent lumpectomy and axillary LND with Dr Crenshaw. Currently undergoing chest RT for breast cancer with plan for definitive management for endometrial cancer pending RT completion date. Ongoing vaginal bleeding - otherwise no changes compared to prior assessment. Occasional dizziness she attributes to vertigo; no recent falls. She was started on Megace for management of bleeding related to endometrial cancer. No fevers/chills; baseline shortness of breath or other changes.     A comprehensive review of systems was performed and otherwise negative.      Objective    BSA: 2.25 meters squared  /77 (BP Location: Left arm, Patient Position: Sitting, BP Cuff Size: Large adult)   Pulse 71   Temp 36.7 °C (98.1 °F) (Temporal)   Resp 18   Wt 116 kg (255 lb 1.2 oz)   SpO2 98%   BMI 46.65 kg/m²      Physical Exam  Vitals and nursing note reviewed.   Constitutional:       General: She is not in acute distress.     Appearance: Normal appearance.   HENT:      Head: Normocephalic and atraumatic.      Mouth/Throat:      Mouth: Mucous membranes are moist.      Pharynx: Oropharynx is clear.   Eyes:      Extraocular Movements: Extraocular movements intact.      Conjunctiva/sclera: Conjunctivae normal.      Pupils: Pupils are equal, round, and reactive to light.   Cardiovascular:      Rate and Rhythm: Normal rate and regular rhythm.      Pulses: Normal pulses.   Pulmonary:      Effort: Pulmonary effort is normal.      Breath sounds: Normal breath sounds. No  wheezing, rhonchi or rales.   Abdominal:      General: There is no distension.      Palpations: Abdomen is soft. There is no mass.      Tenderness: There is no abdominal tenderness.   Genitourinary:     Comments: Deferred  Musculoskeletal:         General: Normal range of motion.      Cervical back: Normal range of motion and neck supple.      Comments: Ambulates with cane at baseline   Skin:     General: Skin is warm.      Findings: No rash.   Neurological:      General: No focal deficit present.      Mental Status: She is alert and oriented to person, place, and time.   Psychiatric:         Mood and Affect: Mood normal.         Behavior: Behavior normal.       Recent Imagin24 - PET/CT  IMPRESSION:  1. FDG avid right breast lesion and right axillary node, consistent  with known breast cancer with chantale metastasis.  2. Intense FDG uptake in endometrial cavity, consistent with the  known endometrial cancer.  3. No PET evidence of distant metastasis.      Pathology:  Endometrial biopsy - 24  A.  ENDOMETRIUM, BIOPSY:              Endometrial adenocarcinoma, grade 2, see comment.  Comment: The results of p53 and MMR immunostains will be reported in addenda.  B.  CERVIX, BIOPSY:              Fragments of inflamed endocervical mucosa, clinically polyp.    Performance Status:  Symptomatic; fully ambulatory    Assessment/Plan   75yo with newly diagnosed FIGO grade 2 endometrioid adenocarcinoma of the uterus, synchronous dual breast primaries with poorly differentiated component, ER/MT positive IDC. ECOG 1.   Oncology History   Malignant neoplasm of overlapping sites of right female breast   2024 Cancer Staged    Staging form: Breast, AJCC 8th Edition, Clinical stage from 2024: Stage IB (cT1, cN0(f), cM0, G3, ER-, MT-, HER2-) - Signed by Mabel Crenshaw MD on 9/3/2024     2024 Cancer Staged    Staging form: Breast, AJCC 8th Edition, Clinical stage from 2024: Stage IA (cT1, cN0(f), cM0, G1, ER+,  WY+, HER2-) - Signed by Mabel Crenshaw MD on 9/3/2024     7/16/2024 Initial Diagnosis    Malignant neoplasm of overlapping sites of right female breast (Multi)     8/14/2024 Cancer Staged    Staging form: Breast, AJCC 8th Edition, Pathologic stage from 8/14/2024: Stage IIIA (pT2(2), pN1mi(sn), cM0, G3, ER-, WY-, HER2-) - Signed by Kacie Simms MD on 8/29/2024       Post-menopausal bleeding   Endometrial cancer  - Previously counseled re: endometrial cancer diagnosis, recommendation to proceed with surgical resection via robot-asssisted TLH/BSO, sLND pending completino of breast radiation.  - Follow up CBC in setting of fatigue, dizziness and ongoing vaginal bleeding  - OR date pending with PAT; consents previously signed.   - Reviewed postoperative expectations and instructions, including pelvic rest for 6 weeks, no heavy lifting for 4 weeks. Surgical consents reviewed and signed in office.   - PAT testing prior to surgery per protocol  - Overnight observation recommended    Malignant neoplasm of right female breast, unspecified estrogen receptor status, unspecified site of breast (Multi)  - Undergoing breast RT with Dr Buitrago; subsequent treatment per Med Onc    Mei Black MD

## 2024-12-05 NOTE — PROGRESS NOTES
Radiation Oncology On Treatment Visit    Patient Name:  Dina Mullins  MRN:  [id number]  :  1950    Referring Provider: No ref. provider found  Primary Care Provider: Ken Saleh PA-C  Care Team: Patient Care Team:  Ken Saleh PA-C as PCP - General (Internal Medicine)  DEN Roach-CNP as PCP - [employer] PCP  Maine Birch MD as Consulting Physician (Hematology and Oncology)  Sherron Taylor RN as Registered Nurse (Hematology and Oncology)  Mei Black MD as Consulting Physician (Obstetrics and Gynecology)    Date of Service: 2024     Diagnosis:   Specialty Problems          Radiation Oncology Problems    Malignant neoplasm of overlapping sites of right female breast        Endometrial cancer (Multi)        Malignant neoplasm of overlapping sites of right female breast, unspecified estrogen receptor status         Treatment Summary:  3D CRT: Right Breast with lymph nodes    Treatment Period Technique Fraction Dose Fractions Total Dose   Course 1 2024-2024  (days elapsed: 13)         R_Breast_Axilla 2024-2024 3D 266 / 266 cGy  1862 / 4,256 cGy     SUBJECTIVE: Has low energy, low mood, and has not been eating and drinking as well.  The patient has breast twinges that are mild.      OBJECTIVE:   Vital Signs:  /71   Pulse 69   Temp 36.6 °C (97.9 °F) (Temporal)   Resp 16   Wt 116 kg (255 lb 11.7 oz)   SpO2 99%   BMI 46.77 kg/m²     Other Pertinent Findings:     Toxicity Assessment          2024    14:30 2024    14:27   Toxicity Assessment   Treatment Site Breast Breast   Anorexia Grade 0 Grade 1   Anxiety Grade 1 Grade 0   Dehydration Grade 1 Grade 1   Depression Grade 1 Grade 1   Dermatitis Radiation Grade 0 Grade 0   Diarrhea Grade 0 Grade 0   Fatigue Grade 1 Grade 1   Fibrosis Deep Connective Tissue Grade 0 Grade 0   Fracture Grade 0 Grade 0   Nausea Grade 0 Grade 0   Pain Grade 0 Grade 0   Treatment Related  Secondary Malignancy Grade 0 Grade 0   Tumor Pain Grade 0 Grade 0   Vomiting Grade 0 Grade 0   Abdominal Pain Grade 0    Dysphagia Grade 0    Esophagitis Grade 0    Gastric Hemorrhage Grade 0    Mucositis Oral Grade 0    Dry Mouth Grade 0    Breast Infection Grade 0 Grade 0   Seroma Grade 0 Grade 0   Joint Range of Motion Decreased Grade 0 Grade 0   Joint Range of Motion Decreased Lumbar Spine Grade 0 Grade 0   Brachial Plexopathy Grade 0 Grade 0   Breast Atrophy Grade 0 Grade 0   Breast Pain Grade 1       twinges Grade 1       twingegs   Nipple Deformity Grade 0 Grade 0   Pneumonitis Grade 0 Grade 0   Edema Limbs Grade 0 Grade 0   Lymphedema Grade 0 Grade 0   Thromboembolic Event Grade 0 Grade 0   Hot Flashes Grade 0 Grade 0          Concurrent systemic therapy: None    Labs: None    Exam: Grade 0 radiation dermatitis of the right breast.     Assessment / Plan:  The patient is tolerating radiation therapy as anticipated.  Continue per current treatment plan.       Therapies: Continued topical use for skin care of the right breast.  Encouraged the patient to increase oral hydration and food intake.  Tylenol as needed for breast twinges.      Side effects reviewed with patient. Images, chart and labs reviewed.    Bib Buitrago MD

## 2024-12-06 ENCOUNTER — HOSPITAL ENCOUNTER (OUTPATIENT)
Dept: RADIATION ONCOLOGY | Facility: CLINIC | Age: 74
Setting detail: RADIATION/ONCOLOGY SERIES
Discharge: HOME | End: 2024-12-06
Payer: MEDICARE

## 2024-12-06 ENCOUNTER — APPOINTMENT (OUTPATIENT)
Dept: RADIATION ONCOLOGY | Facility: CLINIC | Age: 74
End: 2024-12-06
Payer: MEDICARE

## 2024-12-06 ENCOUNTER — APPOINTMENT (OUTPATIENT)
Dept: PREADMISSION TESTING | Facility: HOSPITAL | Age: 74
End: 2024-12-06
Payer: MEDICARE

## 2024-12-06 DIAGNOSIS — Z51.0 ENCOUNTER FOR ANTINEOPLASTIC RADIATION THERAPY: ICD-10-CM

## 2024-12-06 DIAGNOSIS — C50.411 MALIGNANT NEOPLASM OF UPPER-OUTER QUADRANT OF RIGHT FEMALE BREAST: ICD-10-CM

## 2024-12-06 DIAGNOSIS — C50.811 MALIGNANT NEOPLASM OF OVERLAPPING SITES OF RIGHT FEMALE BREAST: ICD-10-CM

## 2024-12-06 LAB
RAD ONC MSQ ACTUAL FRACTIONS DELIVERED: 8
RAD ONC MSQ ACTUAL SESSION DELIVERED DOSE: 266 CGRAY
RAD ONC MSQ ACTUAL TOTAL DOSE: 2128 CGRAY
RAD ONC MSQ ELAPSED DAYS: 14
RAD ONC MSQ LAST DATE: NORMAL
RAD ONC MSQ PRESCRIBED FRACTIONAL DOSE: 266 CGRAY
RAD ONC MSQ PRESCRIBED NUMBER OF FRACTIONS: 16
RAD ONC MSQ PRESCRIBED TECHNIQUE: NORMAL
RAD ONC MSQ PRESCRIBED TOTAL DOSE: 4256 CGRAY
RAD ONC MSQ PRESCRIPTION PATTERN COMMENT: NORMAL
RAD ONC MSQ START DATE: NORMAL
RAD ONC MSQ TREATMENT COURSE NUMBER: 1
RAD ONC MSQ TREATMENT SITE: NORMAL

## 2024-12-06 PROCEDURE — 77412 RADIATION TX DELIVERY LVL 3: CPT | Performed by: STUDENT IN AN ORGANIZED HEALTH CARE EDUCATION/TRAINING PROGRAM

## 2024-12-09 ENCOUNTER — HOSPITAL ENCOUNTER (OUTPATIENT)
Dept: RADIATION ONCOLOGY | Facility: CLINIC | Age: 74
Setting detail: RADIATION/ONCOLOGY SERIES
Discharge: HOME | End: 2024-12-09
Payer: MEDICARE

## 2024-12-09 ENCOUNTER — APPOINTMENT (OUTPATIENT)
Dept: RADIATION ONCOLOGY | Facility: CLINIC | Age: 74
End: 2024-12-09
Payer: MEDICARE

## 2024-12-09 DIAGNOSIS — C50.811 MALIGNANT NEOPLASM OF OVERLAPPING SITES OF RIGHT FEMALE BREAST: ICD-10-CM

## 2024-12-09 DIAGNOSIS — Z51.0 ENCOUNTER FOR ANTINEOPLASTIC RADIATION THERAPY: ICD-10-CM

## 2024-12-09 DIAGNOSIS — E78.5 HYPERLIPIDEMIA, UNSPECIFIED HYPERLIPIDEMIA TYPE: ICD-10-CM

## 2024-12-09 DIAGNOSIS — E11.69 TYPE 2 DIABETES MELLITUS WITH OBESITY (MULTI): ICD-10-CM

## 2024-12-09 DIAGNOSIS — E66.9 TYPE 2 DIABETES MELLITUS WITH OBESITY (MULTI): ICD-10-CM

## 2024-12-09 DIAGNOSIS — C50.411 MALIGNANT NEOPLASM OF UPPER-OUTER QUADRANT OF RIGHT FEMALE BREAST: ICD-10-CM

## 2024-12-09 LAB
RAD ONC MSQ ACTUAL FRACTIONS DELIVERED: 9
RAD ONC MSQ ACTUAL SESSION DELIVERED DOSE: 266 CGRAY
RAD ONC MSQ ACTUAL TOTAL DOSE: 2394 CGRAY
RAD ONC MSQ ELAPSED DAYS: 17
RAD ONC MSQ LAST DATE: NORMAL
RAD ONC MSQ PRESCRIBED FRACTIONAL DOSE: 266 CGRAY
RAD ONC MSQ PRESCRIBED NUMBER OF FRACTIONS: 16
RAD ONC MSQ PRESCRIBED TECHNIQUE: NORMAL
RAD ONC MSQ PRESCRIBED TOTAL DOSE: 4256 CGRAY
RAD ONC MSQ PRESCRIPTION PATTERN COMMENT: NORMAL
RAD ONC MSQ START DATE: NORMAL
RAD ONC MSQ TREATMENT COURSE NUMBER: 1
RAD ONC MSQ TREATMENT SITE: NORMAL

## 2024-12-09 PROCEDURE — 77412 RADIATION TX DELIVERY LVL 3: CPT | Performed by: STUDENT IN AN ORGANIZED HEALTH CARE EDUCATION/TRAINING PROGRAM

## 2024-12-09 RX ORDER — PEN NEEDLE, DIABETIC 30 GX3/16"
NEEDLE, DISPOSABLE MISCELLANEOUS
Qty: 100 EACH | Refills: 3 | Status: SHIPPED | OUTPATIENT
Start: 2024-12-09

## 2024-12-09 NOTE — PROGRESS NOTES
Subjective   Reason for Visit: Dina Mullins is an 74 y.o. female here for a follow up.    HPI  Patient Care Team:  Ken Saleh PA-C as PCP - General (Internal Medicine)  CONSUELO Roach as PCP - [employer] PCP  Maine Birch MD as Consulting Physician (Hematology and Oncology)  Sherron Taylor RN as Registered Nurse (Hematology and Oncology)  Mei Black MD as Consulting Physician (Obstetrics and Gynecology)     Left arm pain:  Seeing ortho.  I gave Robaxin previously.  Was found to have moderate OA in her arm.  No acute injury.  Difficulty with raising her arm.  No elbow/wrist/neck pains.  Mostly in the shoulder area.    Breast cancer/Endometrial cancer:  Has hysterectomy upcoming in Jan 2025.  Was found to be invasive ductal carcinoma in July 2024.  Following with surgical oncology.  Screening and diagnostic mammograms were abnormal in May 2024.    HTN/A-fib:  Taking Lasix, Lisinopril, Xarelto.  Seeing cardiology.  BP today is   Denies headaches.    Diabetes:  Taking Lantus 25 units daily, Rybelsus.  Last A1C was 5.8 in Aug 2024.  POC today is     Dizziness/Tinnitus:  I referred to ENT and vestibular therapy previously.  She had recently been started on Dopamine by neurology when this started.  Taking Meclizine which helps.  She did have a normal brain MRI.  She does see neurology.  Does not follow with ENT.  Causing everyday struggles right now.    HLD:  Taking Atorvastatin.  Last checked Sep 2024.    Anxiety/Depression:  Taking Cymbalta.  Denies SI/HI.    Parkinson's:  Taking Dopamine now.  Seeing neurology.    Stage III CKD:  Creatinine is stable in Sep 2024.    Health maintenance:  Smoking: Never a smoker.  Mammogram (40-75): Abnormal screening/diagnostic mammo in April 2024. Following with surgical oncology.  Labs: Sep 2024.  Colonoscopy (50-75): 2014  DEXA (65+, q 2 years): Dec 2021.  Prevnar 13 (65+):  Pneumovax 23 (65+, 1 year after Prev  13):  Influenza:  Zostavax (60+, 1 time, at pharmacy):    Review of Systems  12 point review of systems negative unless stated above in HPI    Objective   Vitals:  There were no vitals taken for this visit.      Physical Exam  General: Alert and oriented, well nourished, no acute distress.  Lungs: Clear to auscultation, non-labored respiration.  Heart: Normal rate, regular rhythm, no murmur, gallop or edema.  Neurologic: Awake, alert, and oriented X3, CN II-XII intact.  Psychiatric: Cooperative, appropriate mood and affect.    Assessment/Plan     Problem List Items Addressed This Visit          Cardiac and Vasculature    Benign essential HTN    Hyperlipidemia    Paroxysmal atrial fibrillation (Multi)    Atrial fibrillation (Multi)       Endocrine/Metabolic    Type 2 diabetes mellitus with obesity (Multi) - Primary    Relevant Orders    POCT glycosylated hemoglobin (Hb A1C) manually resulted    Obesity, morbid (Multi)       Genitourinary and Reproductive    Stage 3b chronic kidney disease (Multi)       Hematology and Neoplasia    Endometrial cancer (Multi)    Malignant neoplasm of overlapping sites of right female breast, unspecified estrogen receptor status       Mental Health    Mixed anxiety and depressive disorder       Neuro    Parkinsonism (Multi)       Symptoms and Signs    Dizziness

## 2024-12-10 ENCOUNTER — APPOINTMENT (OUTPATIENT)
Dept: RADIATION ONCOLOGY | Facility: CLINIC | Age: 74
End: 2024-12-10
Payer: MEDICARE

## 2024-12-10 ENCOUNTER — HOSPITAL ENCOUNTER (OUTPATIENT)
Dept: RADIATION ONCOLOGY | Facility: CLINIC | Age: 74
Setting detail: RADIATION/ONCOLOGY SERIES
Discharge: HOME | End: 2024-12-10
Payer: MEDICARE

## 2024-12-10 DIAGNOSIS — C50.411 MALIGNANT NEOPLASM OF UPPER-OUTER QUADRANT OF RIGHT FEMALE BREAST: ICD-10-CM

## 2024-12-10 DIAGNOSIS — C50.811 MALIGNANT NEOPLASM OF OVERLAPPING SITES OF RIGHT FEMALE BREAST: ICD-10-CM

## 2024-12-10 DIAGNOSIS — Z51.0 ENCOUNTER FOR ANTINEOPLASTIC RADIATION THERAPY: ICD-10-CM

## 2024-12-10 LAB
RAD ONC MSQ ACTUAL FRACTIONS DELIVERED: 10
RAD ONC MSQ ACTUAL SESSION DELIVERED DOSE: 266 CGRAY
RAD ONC MSQ ACTUAL TOTAL DOSE: 2660 CGRAY
RAD ONC MSQ ELAPSED DAYS: 18
RAD ONC MSQ LAST DATE: NORMAL
RAD ONC MSQ PRESCRIBED FRACTIONAL DOSE: 266 CGRAY
RAD ONC MSQ PRESCRIBED NUMBER OF FRACTIONS: 16
RAD ONC MSQ PRESCRIBED TECHNIQUE: NORMAL
RAD ONC MSQ PRESCRIBED TOTAL DOSE: 4256 CGRAY
RAD ONC MSQ PRESCRIPTION PATTERN COMMENT: NORMAL
RAD ONC MSQ START DATE: NORMAL
RAD ONC MSQ TREATMENT COURSE NUMBER: 1
RAD ONC MSQ TREATMENT SITE: NORMAL

## 2024-12-10 PROCEDURE — 77412 RADIATION TX DELIVERY LVL 3: CPT | Performed by: STUDENT IN AN ORGANIZED HEALTH CARE EDUCATION/TRAINING PROGRAM

## 2024-12-11 ENCOUNTER — APPOINTMENT (OUTPATIENT)
Dept: RADIATION ONCOLOGY | Facility: CLINIC | Age: 74
End: 2024-12-11
Payer: MEDICARE

## 2024-12-12 ENCOUNTER — HOSPITAL ENCOUNTER (OUTPATIENT)
Dept: RADIATION ONCOLOGY | Facility: CLINIC | Age: 74
Setting detail: RADIATION/ONCOLOGY SERIES
Discharge: HOME | End: 2024-12-12
Payer: MEDICARE

## 2024-12-12 ENCOUNTER — APPOINTMENT (OUTPATIENT)
Dept: PRIMARY CARE | Facility: CLINIC | Age: 74
End: 2024-12-12
Payer: MEDICARE

## 2024-12-12 ENCOUNTER — APPOINTMENT (OUTPATIENT)
Dept: RADIATION ONCOLOGY | Facility: CLINIC | Age: 74
End: 2024-12-12
Payer: MEDICARE

## 2024-12-12 DIAGNOSIS — C50.411 MALIGNANT NEOPLASM OF UPPER-OUTER QUADRANT OF RIGHT FEMALE BREAST: ICD-10-CM

## 2024-12-12 DIAGNOSIS — Z51.0 ENCOUNTER FOR ANTINEOPLASTIC RADIATION THERAPY: ICD-10-CM

## 2024-12-12 DIAGNOSIS — C50.811 MALIGNANT NEOPLASM OF OVERLAPPING SITES OF RIGHT FEMALE BREAST: ICD-10-CM

## 2024-12-12 LAB
RAD ONC MSQ ACTUAL FRACTIONS DELIVERED: 11
RAD ONC MSQ ACTUAL SESSION DELIVERED DOSE: 266 CGRAY
RAD ONC MSQ ACTUAL TOTAL DOSE: 2926 CGRAY
RAD ONC MSQ ELAPSED DAYS: 20
RAD ONC MSQ LAST DATE: NORMAL
RAD ONC MSQ PRESCRIBED FRACTIONAL DOSE: 266 CGRAY
RAD ONC MSQ PRESCRIBED NUMBER OF FRACTIONS: 16
RAD ONC MSQ PRESCRIBED TECHNIQUE: NORMAL
RAD ONC MSQ PRESCRIBED TOTAL DOSE: 4256 CGRAY
RAD ONC MSQ PRESCRIPTION PATTERN COMMENT: NORMAL
RAD ONC MSQ START DATE: NORMAL
RAD ONC MSQ TREATMENT COURSE NUMBER: 1
RAD ONC MSQ TREATMENT SITE: NORMAL

## 2024-12-12 PROCEDURE — 77417 THER RADIOLOGY PORT IMAGE(S): CPT | Performed by: RADIOLOGY

## 2024-12-12 PROCEDURE — 77412 RADIATION TX DELIVERY LVL 3: CPT | Performed by: STUDENT IN AN ORGANIZED HEALTH CARE EDUCATION/TRAINING PROGRAM

## 2024-12-13 ENCOUNTER — HOSPITAL ENCOUNTER (OUTPATIENT)
Dept: RADIATION ONCOLOGY | Facility: CLINIC | Age: 74
Setting detail: RADIATION/ONCOLOGY SERIES
Discharge: HOME | End: 2024-12-13
Payer: MEDICARE

## 2024-12-13 ENCOUNTER — APPOINTMENT (OUTPATIENT)
Dept: RADIATION ONCOLOGY | Facility: CLINIC | Age: 74
End: 2024-12-13
Payer: MEDICARE

## 2024-12-13 ENCOUNTER — RADIATION ONCOLOGY OTV (OUTPATIENT)
Dept: RADIATION ONCOLOGY | Facility: CLINIC | Age: 74
End: 2024-12-13
Payer: MEDICARE

## 2024-12-13 VITALS
OXYGEN SATURATION: 100 % | HEART RATE: 69 BPM | TEMPERATURE: 96.1 F | SYSTOLIC BLOOD PRESSURE: 124 MMHG | DIASTOLIC BLOOD PRESSURE: 78 MMHG | RESPIRATION RATE: 18 BRPM | BODY MASS INDEX: 46.41 KG/M2 | WEIGHT: 253.75 LBS

## 2024-12-13 DIAGNOSIS — Z51.0 ENCOUNTER FOR ANTINEOPLASTIC RADIATION THERAPY: ICD-10-CM

## 2024-12-13 DIAGNOSIS — C50.411 MALIGNANT NEOPLASM OF UPPER-OUTER QUADRANT OF RIGHT FEMALE BREAST: ICD-10-CM

## 2024-12-13 DIAGNOSIS — C50.811 MALIGNANT NEOPLASM OF OVERLAPPING SITES OF RIGHT FEMALE BREAST: ICD-10-CM

## 2024-12-13 DIAGNOSIS — R30.0 DYSURIA: Primary | ICD-10-CM

## 2024-12-13 LAB
RAD ONC MSQ ACTUAL FRACTIONS DELIVERED: 12
RAD ONC MSQ ACTUAL SESSION DELIVERED DOSE: 266 CGRAY
RAD ONC MSQ ACTUAL TOTAL DOSE: 3192 CGRAY
RAD ONC MSQ ELAPSED DAYS: 21
RAD ONC MSQ LAST DATE: NORMAL
RAD ONC MSQ PRESCRIBED FRACTIONAL DOSE: 266 CGRAY
RAD ONC MSQ PRESCRIBED NUMBER OF FRACTIONS: 16
RAD ONC MSQ PRESCRIBED TECHNIQUE: NORMAL
RAD ONC MSQ PRESCRIBED TOTAL DOSE: 4256 CGRAY
RAD ONC MSQ PRESCRIPTION PATTERN COMMENT: NORMAL
RAD ONC MSQ START DATE: NORMAL
RAD ONC MSQ TREATMENT COURSE NUMBER: 1
RAD ONC MSQ TREATMENT SITE: NORMAL

## 2024-12-13 PROCEDURE — 77412 RADIATION TX DELIVERY LVL 3: CPT | Performed by: STUDENT IN AN ORGANIZED HEALTH CARE EDUCATION/TRAINING PROGRAM

## 2024-12-13 ASSESSMENT — ENCOUNTER SYMPTOMS
DEPRESSION: 0
OCCASIONAL FEELINGS OF UNSTEADINESS: 0
LOSS OF SENSATION IN FEET: 0

## 2024-12-13 ASSESSMENT — PAIN SCALES - GENERAL: PAINLEVEL_OUTOF10: 0-NO PAIN

## 2024-12-13 NOTE — PROGRESS NOTES
Radiation Oncology On Treatment Visit    Patient Name:  Dina Mullins  MRN:  [id number]  :  1950    Referring Provider: No ref. provider found  Primary Care Provider: Ken Saleh PA-C  Care Team: Patient Care Team:  Ken Saleh PA-C as PCP - General (Internal Medicine)  DEN Roach-CNP as PCP - [employer] PCP  Maine Birch MD as Consulting Physician (Hematology and Oncology)  Sherron Taylor RN as Registered Nurse (Hematology and Oncology)  Mei Black MD as Consulting Physician (Obstetrics and Gynecology)    Date of Service: 2024     Diagnosis:   Specialty Problems          Radiation Oncology Problems    Malignant neoplasm of overlapping sites of right female breast        Endometrial cancer (Multi)        Malignant neoplasm of overlapping sites of right female breast, unspecified estrogen receptor status         Treatment Summary:  3D CRT: Right Breast with lymph nodes    Treatment Period Technique Fraction Dose Fractions Total Dose   Course 1 2024-2024  (days elapsed: 21)         R_Breast_Axilla 2024-2024 3D 266 / 266 cGy  3192 / 4,256 cGy     SUBJECTIVE: Low energy and poor appetite.  Using topicals to the right breast.  Mild breast pains using Tylenol.  Dysuria and frequent urinary tract infections previously treated with antibiotics.      OBJECTIVE:   Vital Signs:  /78   Pulse 69   Temp 35.6 °C (96.1 °F) (Temporal)   Resp 18   Wt 115 kg (253 lb 12 oz)   SpO2 100%   BMI 46.41 kg/m²     Other Pertinent Findings:     Toxicity Assessment          2024    14:30 2024    14:27 2024    14:51   Toxicity Assessment   Treatment Site Breast Breast Breast   Anorexia Grade 0 Grade 1 Grade 1   Anxiety Grade 1 Grade 0 Grade 0   Dehydration Grade 1 Grade 1 Grade 1   Depression Grade 1 Grade 1 Grade 1   Dermatitis Radiation Grade 0 Grade 0 Grade 1   Diarrhea Grade 0 Grade 0 Grade 0   Fatigue Grade 1 Grade 1  Grade 1   Fibrosis Deep Connective Tissue Grade 0 Grade 0 Grade 0   Fracture Grade 0 Grade 0 Grade 0   Nausea Grade 0 Grade 0 Grade 0   Pain Grade 0 Grade 0 Grade 0   Treatment Related Secondary Malignancy Grade 0 Grade 0 Grade 0   Tumor Pain Grade 0 Grade 0 Grade 0   Vomiting Grade 0 Grade 0 Grade 0   Abdominal Pain Grade 0  Grade 0   Dysphagia Grade 0  Grade 0   Esophagitis Grade 0  Grade 0   Gastric Hemorrhage Grade 0  Grade 0   Mucositis Oral Grade 0  Grade 0   Dry Mouth Grade 0  Grade 0   Breast Infection Grade 0 Grade 0 Grade 0   Seroma Grade 0 Grade 0 Grade 0   Joint Range of Motion Decreased Grade 0 Grade 0 Grade 0   Joint Range of Motion Decreased Lumbar Spine Grade 0 Grade 0 Grade 0   Brachial Plexopathy Grade 0 Grade 0 Grade 0   Breast Atrophy Grade 0 Grade 0 Grade 0   Breast Pain Grade 1       twinges Grade 1       twingegs Grade 1   Nipple Deformity Grade 0 Grade 0 Grade 0   Pneumonitis Grade 0 Grade 0 Grade 0   Edema Limbs Grade 0 Grade 0 Grade 0   Lymphedema Grade 0 Grade 0 Grade 0   Thromboembolic Event Grade 0 Grade 0 Grade 0   Hot Flashes Grade 0 Grade 0           Concurrent systemic therapy: None    Labs:  None    Exam: 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    Assessment / Plan:  The patient is tolerating radiation therapy as anticipated.  Continue per current treatment plan.       Therapies: Continue Tylenol as needed for breast and axilla discomfort.  Encourage continued topical use as needed for radiation dermatitis.    Side effects reviewed with patient. Images, chart and labs reviewed.    Bib Buitrago MD

## 2024-12-16 ENCOUNTER — HOSPITAL ENCOUNTER (OUTPATIENT)
Dept: RADIATION ONCOLOGY | Facility: CLINIC | Age: 74
Setting detail: RADIATION/ONCOLOGY SERIES
Discharge: HOME | End: 2024-12-16
Payer: MEDICARE

## 2024-12-16 ENCOUNTER — APPOINTMENT (OUTPATIENT)
Dept: RADIATION ONCOLOGY | Facility: CLINIC | Age: 74
End: 2024-12-16
Payer: MEDICARE

## 2024-12-16 DIAGNOSIS — C50.411 MALIGNANT NEOPLASM OF UPPER-OUTER QUADRANT OF RIGHT FEMALE BREAST: ICD-10-CM

## 2024-12-16 DIAGNOSIS — Z51.0 ENCOUNTER FOR ANTINEOPLASTIC RADIATION THERAPY: ICD-10-CM

## 2024-12-16 DIAGNOSIS — N30.00 ACUTE CYSTITIS WITHOUT HEMATURIA: Primary | ICD-10-CM

## 2024-12-16 DIAGNOSIS — R30.0 DYSURIA: ICD-10-CM

## 2024-12-16 DIAGNOSIS — C50.811 MALIGNANT NEOPLASM OF OVERLAPPING SITES OF RIGHT FEMALE BREAST: ICD-10-CM

## 2024-12-16 LAB
RAD ONC MSQ ACTUAL FRACTIONS DELIVERED: 13
RAD ONC MSQ ACTUAL SESSION DELIVERED DOSE: 266 CGRAY
RAD ONC MSQ ACTUAL TOTAL DOSE: 3458 CGRAY
RAD ONC MSQ ELAPSED DAYS: 24
RAD ONC MSQ LAST DATE: NORMAL
RAD ONC MSQ PRESCRIBED FRACTIONAL DOSE: 266 CGRAY
RAD ONC MSQ PRESCRIBED NUMBER OF FRACTIONS: 16
RAD ONC MSQ PRESCRIBED TECHNIQUE: NORMAL
RAD ONC MSQ PRESCRIBED TOTAL DOSE: 4256 CGRAY
RAD ONC MSQ PRESCRIPTION PATTERN COMMENT: NORMAL
RAD ONC MSQ START DATE: NORMAL
RAD ONC MSQ TREATMENT COURSE NUMBER: 1
RAD ONC MSQ TREATMENT SITE: NORMAL

## 2024-12-16 PROCEDURE — 87086 URINE CULTURE/COLONY COUNT: CPT | Performed by: STUDENT IN AN ORGANIZED HEALTH CARE EDUCATION/TRAINING PROGRAM

## 2024-12-16 PROCEDURE — 77412 RADIATION TX DELIVERY LVL 3: CPT | Performed by: STUDENT IN AN ORGANIZED HEALTH CARE EDUCATION/TRAINING PROGRAM

## 2024-12-16 PROCEDURE — 81001 URINALYSIS AUTO W/SCOPE: CPT | Performed by: STUDENT IN AN ORGANIZED HEALTH CARE EDUCATION/TRAINING PROGRAM

## 2024-12-17 ENCOUNTER — HOSPITAL ENCOUNTER (OUTPATIENT)
Dept: RADIATION ONCOLOGY | Facility: CLINIC | Age: 74
Setting detail: RADIATION/ONCOLOGY SERIES
Discharge: HOME | End: 2024-12-17
Payer: MEDICARE

## 2024-12-17 ENCOUNTER — APPOINTMENT (OUTPATIENT)
Dept: RADIATION ONCOLOGY | Facility: CLINIC | Age: 74
End: 2024-12-17
Payer: MEDICARE

## 2024-12-17 ENCOUNTER — OFFICE VISIT (OUTPATIENT)
Dept: ORTHOPEDIC SURGERY | Facility: CLINIC | Age: 74
End: 2024-12-17
Payer: MEDICARE

## 2024-12-17 DIAGNOSIS — C50.811 MALIGNANT NEOPLASM OF OVERLAPPING SITES OF RIGHT FEMALE BREAST: ICD-10-CM

## 2024-12-17 DIAGNOSIS — M25.561 CHRONIC PAIN OF BOTH KNEES: Primary | ICD-10-CM

## 2024-12-17 DIAGNOSIS — G89.29 CHRONIC PAIN OF BOTH KNEES: Primary | ICD-10-CM

## 2024-12-17 DIAGNOSIS — M25.562 CHRONIC PAIN OF BOTH KNEES: Primary | ICD-10-CM

## 2024-12-17 DIAGNOSIS — M17.0 PRIMARY OSTEOARTHRITIS OF KNEES, BILATERAL: ICD-10-CM

## 2024-12-17 DIAGNOSIS — Z51.0 ENCOUNTER FOR ANTINEOPLASTIC RADIATION THERAPY: ICD-10-CM

## 2024-12-17 DIAGNOSIS — C50.411 MALIGNANT NEOPLASM OF UPPER-OUTER QUADRANT OF RIGHT FEMALE BREAST: ICD-10-CM

## 2024-12-17 LAB
RAD ONC MSQ ACTUAL FRACTIONS DELIVERED: 14
RAD ONC MSQ ACTUAL SESSION DELIVERED DOSE: 266 CGRAY
RAD ONC MSQ ACTUAL TOTAL DOSE: 3724 CGRAY
RAD ONC MSQ ELAPSED DAYS: 25
RAD ONC MSQ LAST DATE: NORMAL
RAD ONC MSQ PRESCRIBED FRACTIONAL DOSE: 266 CGRAY
RAD ONC MSQ PRESCRIBED NUMBER OF FRACTIONS: 16
RAD ONC MSQ PRESCRIBED TECHNIQUE: NORMAL
RAD ONC MSQ PRESCRIBED TOTAL DOSE: 4256 CGRAY
RAD ONC MSQ PRESCRIPTION PATTERN COMMENT: NORMAL
RAD ONC MSQ START DATE: NORMAL
RAD ONC MSQ TREATMENT COURSE NUMBER: 1
RAD ONC MSQ TREATMENT SITE: NORMAL

## 2024-12-17 PROCEDURE — 1159F MED LIST DOCD IN RCRD: CPT | Performed by: PHYSICIAN ASSISTANT

## 2024-12-17 PROCEDURE — 99213 OFFICE O/P EST LOW 20 MIN: CPT | Performed by: PHYSICIAN ASSISTANT

## 2024-12-17 PROCEDURE — 3051F HG A1C>EQUAL 7.0%<8.0%: CPT | Performed by: PHYSICIAN ASSISTANT

## 2024-12-17 PROCEDURE — 4010F ACE/ARB THERAPY RXD/TAKEN: CPT | Performed by: PHYSICIAN ASSISTANT

## 2024-12-17 PROCEDURE — 77412 RADIATION TX DELIVERY LVL 3: CPT | Performed by: STUDENT IN AN ORGANIZED HEALTH CARE EDUCATION/TRAINING PROGRAM

## 2024-12-17 PROCEDURE — 1157F ADVNC CARE PLAN IN RCRD: CPT | Performed by: PHYSICIAN ASSISTANT

## 2024-12-17 PROCEDURE — 3048F LDL-C <100 MG/DL: CPT | Performed by: PHYSICIAN ASSISTANT

## 2024-12-17 PROCEDURE — 1160F RVW MEDS BY RX/DR IN RCRD: CPT | Performed by: PHYSICIAN ASSISTANT

## 2024-12-17 PROCEDURE — 77336 RADIATION PHYSICS CONSULT: CPT | Performed by: STUDENT IN AN ORGANIZED HEALTH CARE EDUCATION/TRAINING PROGRAM

## 2024-12-17 PROCEDURE — 1036F TOBACCO NON-USER: CPT | Performed by: PHYSICIAN ASSISTANT

## 2024-12-17 NOTE — PROGRESS NOTES
Subjective      No chief complaint on file.       Past Surgical History:   Procedure Laterality Date    BIOPSY  2024    uterine    BREAST BIOPSY Right 07/01/2024    IDC    BREAST LUMPECTOMY Right 08/07/2024    BREAST LUMPECTOMY Right     LYMPH NODE DISSECTION Right 08/07/2024        HPI  This 73 year old patient presents with complaints of left and right knee pain that has been present and worsening for many years. Her pain has gotten significantly worse over the past several month. She is using a straight cane for support with ambulation. She has tried cortisone injection to bilateral knees with no relief of the pain. She states that the right and left knee pain is debilitating and impairs her ability to do her normal activities of daily living. The patient has breast cancer and is currently being seen and evaluated by Dr. Crenshaw.    CARDIOLOGY:   Negative for chest pain, shortness of breath.   RESPIRATORY:   Negative for chest pain, shortness of breath.   MUSCULOSKELETAL:   See HPI for details.   NEUROLOGY:   Negative for tingling, numbness, weakness.    Objective      There were no vitals taken for this visit.     SHOULDER EXAM  Constitutional: Appears stated age. No apparent distress  Labored Breathing: No  Psychiatric: Normal mood and effect.   Neurological: alert and oriented x3  Skin: intact  HEENT: No bruising, otorrhea, rhinorrhea.  MUSCULOSKELETAL: Neck: No tenderness. No pain or limitation with range of motion. Back: No tenderness. Straight leg test negative bilaterally. Right and left knees: There is diffuse tenderness and pain with palpation over the left and right knees. The patient has pain with ROM at the right knee from 2-115 degrees and left knee from 2-112 degrees. No effusion present. Charis's is equivocal bilaterally. Anterior drawer is equivocal bilaterally. No effusion is present. Nontender in the right and left calf. Neurovascular is intact. Patient is seen ambulating with a painful gait.      AP and lateral x-rays of the left shoulder done on 5- shows   IMPRESSION:  Moderate acromioclavicular and mild glenohumeral osteoarthrosis.    I reviewed the x-rays listed above with the patient in the office today.    AP and lateral x-rays of the right and left knees done on 11-1-2024 shows:   FINDINGS:  Standing AP x-rays of both knees and lateral x-rays of the left knee  done and read in the office today show severe osteoarthritis of the  left and right knees with severe loss of joint surface cartilage,  sclerosis and bone-on-bone.  No definite evidence of destruction is  seen on these x-rays in a patient with a known history of breast  cancer.    Patient ID: Dina Mullins is a 74 y.o. female.      Diagnoses and all orders for this visit:  Chronic pain of both knees (Primary)  Primary osteoarthritis of knees, bilateral       Options are discussed with the patient in detail. We will apply for visco supplementation injections for this right and left knee pain as the patient does not have relief with cortisone injections or tylenol or ibuprofen. The patient is instructed regarding activity modification, ice, physician assistant directed at home gentle strengthening and ROM exercises, and the appropriate use of Tylenol as needed for pain with its potential adverse reactions and side effects. The patient understands. Return after approval is obtained or sooner as needed, Please note that this report has been produced using speech recognition software.  It may contain errors related to grammar, punctuation or spelling.  Electronically signed, but not reviewed.

## 2024-12-17 NOTE — PATIENT INSTRUCTIONS
Thank you for coming to see us today!     Continue rest, ice 20 mins three times a day and elevation  Tylenol OTC as needed for pain control.    We are going to apply for gel injections for the left and right knees today.   We will call you to schedule your appointments once we receive approval from the insurance company.

## 2024-12-18 ENCOUNTER — HOSPITAL ENCOUNTER (OUTPATIENT)
Dept: RADIATION ONCOLOGY | Facility: CLINIC | Age: 74
Setting detail: RADIATION/ONCOLOGY SERIES
Discharge: HOME | End: 2024-12-18
Payer: MEDICARE

## 2024-12-18 ENCOUNTER — APPOINTMENT (OUTPATIENT)
Dept: RADIATION ONCOLOGY | Facility: CLINIC | Age: 74
End: 2024-12-18
Payer: MEDICARE

## 2024-12-18 ENCOUNTER — LAB (OUTPATIENT)
Dept: LAB | Facility: CLINIC | Age: 74
End: 2024-12-18
Payer: MEDICARE

## 2024-12-18 DIAGNOSIS — C50.811 MALIGNANT NEOPLASM OF OVERLAPPING SITES OF RIGHT FEMALE BREAST: ICD-10-CM

## 2024-12-18 DIAGNOSIS — C50.411 MALIGNANT NEOPLASM OF UPPER-OUTER QUADRANT OF RIGHT FEMALE BREAST: ICD-10-CM

## 2024-12-18 DIAGNOSIS — Z51.0 ENCOUNTER FOR ANTINEOPLASTIC RADIATION THERAPY: ICD-10-CM

## 2024-12-18 LAB
APPEARANCE UR: ABNORMAL
BILIRUB UR STRIP.AUTO-MCNC: NEGATIVE MG/DL
COLOR UR: ABNORMAL
GLUCOSE UR STRIP.AUTO-MCNC: ABNORMAL MG/DL
HYALINE CASTS #/AREA URNS AUTO: ABNORMAL /LPF
KETONES UR STRIP.AUTO-MCNC: ABNORMAL MG/DL
LEUKOCYTE ESTERASE UR QL STRIP.AUTO: ABNORMAL
MUCOUS THREADS #/AREA URNS AUTO: ABNORMAL /LPF
NITRITE UR QL STRIP.AUTO: NEGATIVE
PH UR STRIP.AUTO: 5.5 [PH]
PROT UR STRIP.AUTO-MCNC: ABNORMAL MG/DL
RAD ONC MSQ ACTUAL FRACTIONS DELIVERED: 15
RAD ONC MSQ ACTUAL SESSION DELIVERED DOSE: 266 CGRAY
RAD ONC MSQ ACTUAL TOTAL DOSE: 3990 CGRAY
RAD ONC MSQ ELAPSED DAYS: 26
RAD ONC MSQ LAST DATE: NORMAL
RAD ONC MSQ PRESCRIBED FRACTIONAL DOSE: 266 CGRAY
RAD ONC MSQ PRESCRIBED NUMBER OF FRACTIONS: 16
RAD ONC MSQ PRESCRIBED TECHNIQUE: NORMAL
RAD ONC MSQ PRESCRIBED TOTAL DOSE: 4256 CGRAY
RAD ONC MSQ PRESCRIPTION PATTERN COMMENT: NORMAL
RAD ONC MSQ START DATE: NORMAL
RAD ONC MSQ TREATMENT COURSE NUMBER: 1
RAD ONC MSQ TREATMENT SITE: NORMAL
RBC # UR STRIP.AUTO: ABNORMAL /UL
RBC #/AREA URNS AUTO: >20 /HPF
SP GR UR STRIP.AUTO: 1.03
SQUAMOUS #/AREA URNS AUTO: ABNORMAL /HPF
UROBILINOGEN UR STRIP.AUTO-MCNC: NORMAL MG/DL
WBC #/AREA URNS AUTO: >50 /HPF

## 2024-12-18 PROCEDURE — 77412 RADIATION TX DELIVERY LVL 3: CPT | Performed by: STUDENT IN AN ORGANIZED HEALTH CARE EDUCATION/TRAINING PROGRAM

## 2024-12-19 ENCOUNTER — HOSPITAL ENCOUNTER (OUTPATIENT)
Dept: RADIATION ONCOLOGY | Facility: CLINIC | Age: 74
Setting detail: RADIATION/ONCOLOGY SERIES
Discharge: HOME | End: 2024-12-19
Payer: MEDICARE

## 2024-12-19 ENCOUNTER — APPOINTMENT (OUTPATIENT)
Dept: PRIMARY CARE | Facility: CLINIC | Age: 74
End: 2024-12-19
Payer: MEDICARE

## 2024-12-19 DIAGNOSIS — I48.0 PAROXYSMAL ATRIAL FIBRILLATION (MULTI): ICD-10-CM

## 2024-12-19 DIAGNOSIS — E11.69 TYPE 2 DIABETES MELLITUS WITH OBESITY (MULTI): Primary | ICD-10-CM

## 2024-12-19 DIAGNOSIS — Z51.0 ENCOUNTER FOR ANTINEOPLASTIC RADIATION THERAPY: ICD-10-CM

## 2024-12-19 DIAGNOSIS — I48.11 LONGSTANDING PERSISTENT ATRIAL FIBRILLATION (MULTI): ICD-10-CM

## 2024-12-19 DIAGNOSIS — G20.A1 PARKINSON'S DISEASE, UNSPECIFIED WHETHER DYSKINESIA PRESENT, UNSPECIFIED WHETHER MANIFESTATIONS FLUCTUATE: ICD-10-CM

## 2024-12-19 DIAGNOSIS — E66.01 OBESITY, MORBID (MULTI): ICD-10-CM

## 2024-12-19 DIAGNOSIS — N18.32 STAGE 3B CHRONIC KIDNEY DISEASE (MULTI): ICD-10-CM

## 2024-12-19 DIAGNOSIS — F41.8 MIXED ANXIETY AND DEPRESSIVE DISORDER: ICD-10-CM

## 2024-12-19 DIAGNOSIS — C50.411 MALIGNANT NEOPLASM OF UPPER-OUTER QUADRANT OF RIGHT FEMALE BREAST: ICD-10-CM

## 2024-12-19 DIAGNOSIS — R42 DIZZINESS: ICD-10-CM

## 2024-12-19 DIAGNOSIS — C50.811 MALIGNANT NEOPLASM OF OVERLAPPING SITES OF RIGHT FEMALE BREAST, UNSPECIFIED ESTROGEN RECEPTOR STATUS: ICD-10-CM

## 2024-12-19 DIAGNOSIS — E66.9 TYPE 2 DIABETES MELLITUS WITH OBESITY (MULTI): Primary | ICD-10-CM

## 2024-12-19 DIAGNOSIS — C54.1 ENDOMETRIAL CANCER (MULTI): ICD-10-CM

## 2024-12-19 DIAGNOSIS — E78.00 PURE HYPERCHOLESTEROLEMIA: ICD-10-CM

## 2024-12-19 DIAGNOSIS — I10 BENIGN ESSENTIAL HTN: ICD-10-CM

## 2024-12-19 DIAGNOSIS — C50.811 MALIGNANT NEOPLASM OF OVERLAPPING SITES OF RIGHT FEMALE BREAST: ICD-10-CM

## 2024-12-19 LAB
HOLD SPECIMEN: NORMAL
RAD ONC MSQ ACTUAL FRACTIONS DELIVERED: 16
RAD ONC MSQ ACTUAL SESSION DELIVERED DOSE: 266 CGRAY
RAD ONC MSQ ACTUAL TOTAL DOSE: 4256 CGRAY
RAD ONC MSQ ELAPSED DAYS: 27
RAD ONC MSQ LAST DATE: NORMAL
RAD ONC MSQ PRESCRIBED FRACTIONAL DOSE: 266 CGRAY
RAD ONC MSQ PRESCRIBED NUMBER OF FRACTIONS: 16
RAD ONC MSQ PRESCRIBED TECHNIQUE: NORMAL
RAD ONC MSQ PRESCRIBED TOTAL DOSE: 4256 CGRAY
RAD ONC MSQ PRESCRIPTION PATTERN COMMENT: NORMAL
RAD ONC MSQ START DATE: NORMAL
RAD ONC MSQ TREATMENT COURSE NUMBER: 1
RAD ONC MSQ TREATMENT SITE: NORMAL

## 2024-12-19 PROCEDURE — 77412 RADIATION TX DELIVERY LVL 3: CPT | Performed by: STUDENT IN AN ORGANIZED HEALTH CARE EDUCATION/TRAINING PROGRAM

## 2024-12-19 RX ORDER — AMOXICILLIN AND CLAVULANATE POTASSIUM 875; 125 MG/1; MG/1
1 TABLET, FILM COATED ORAL 2 TIMES DAILY
Qty: 14 TABLET | Refills: 0 | Status: SHIPPED | OUTPATIENT
Start: 2024-12-19 | End: 2024-12-26

## 2024-12-19 NOTE — ADDENDUM NOTE
Encounter addended by: Bib Buitrago MD on: 12/19/2024 7:12 AM   Actions taken: Visit diagnoses modified, Order list changed, Diagnosis association updated

## 2024-12-20 ENCOUNTER — HOSPITAL ENCOUNTER (OUTPATIENT)
Dept: RADIATION ONCOLOGY | Facility: CLINIC | Age: 74
Setting detail: RADIATION/ONCOLOGY SERIES
Discharge: HOME | End: 2024-12-20
Payer: MEDICARE

## 2024-12-20 ENCOUNTER — APPOINTMENT (OUTPATIENT)
Dept: PREADMISSION TESTING | Facility: HOSPITAL | Age: 74
End: 2024-12-20
Payer: MEDICARE

## 2024-12-20 ENCOUNTER — RADIATION ONCOLOGY OTV (OUTPATIENT)
Dept: RADIATION ONCOLOGY | Facility: CLINIC | Age: 74
End: 2024-12-20
Payer: MEDICARE

## 2024-12-20 VITALS
OXYGEN SATURATION: 98 % | SYSTOLIC BLOOD PRESSURE: 141 MMHG | TEMPERATURE: 97.9 F | DIASTOLIC BLOOD PRESSURE: 72 MMHG | RESPIRATION RATE: 18 BRPM | HEART RATE: 58 BPM

## 2024-12-20 DIAGNOSIS — C50.411 MALIGNANT NEOPLASM OF UPPER-OUTER QUADRANT OF RIGHT FEMALE BREAST: ICD-10-CM

## 2024-12-20 DIAGNOSIS — C50.811 MALIGNANT NEOPLASM OF OVERLAPPING SITES OF RIGHT FEMALE BREAST, UNSPECIFIED ESTROGEN RECEPTOR STATUS: ICD-10-CM

## 2024-12-20 DIAGNOSIS — Z51.0 ENCOUNTER FOR ANTINEOPLASTIC RADIATION THERAPY: ICD-10-CM

## 2024-12-20 DIAGNOSIS — C50.811 MALIGNANT NEOPLASM OF OVERLAPPING SITES OF RIGHT FEMALE BREAST: ICD-10-CM

## 2024-12-20 LAB
RAD ONC MSQ ACTUAL FRACTIONS DELIVERED: 1
RAD ONC MSQ ACTUAL SESSION DELIVERED DOSE: 250 CGRAY
RAD ONC MSQ ACTUAL TOTAL DOSE: 250 CGRAY
RAD ONC MSQ ELAPSED DAYS: 0
RAD ONC MSQ LAST DATE: NORMAL
RAD ONC MSQ PRESCRIBED FRACTIONAL DOSE: 250 CGRAY
RAD ONC MSQ PRESCRIBED NUMBER OF FRACTIONS: 4
RAD ONC MSQ PRESCRIBED TECHNIQUE: NORMAL
RAD ONC MSQ PRESCRIBED TOTAL DOSE: 1000 CGRAY
RAD ONC MSQ PRESCRIPTION PATTERN COMMENT: NORMAL
RAD ONC MSQ START DATE: NORMAL
RAD ONC MSQ TREATMENT COURSE NUMBER: 1
RAD ONC MSQ TREATMENT SITE: NORMAL

## 2024-12-20 PROCEDURE — 77412 RADIATION TX DELIVERY LVL 3: CPT | Performed by: STUDENT IN AN ORGANIZED HEALTH CARE EDUCATION/TRAINING PROGRAM

## 2024-12-20 PROCEDURE — 77280 THER RAD SIMULAJ FIELD SMPL: CPT | Performed by: STUDENT IN AN ORGANIZED HEALTH CARE EDUCATION/TRAINING PROGRAM

## 2024-12-20 ASSESSMENT — PAIN SCALES - GENERAL: PAINLEVEL_OUTOF10: 0-NO PAIN

## 2024-12-20 NOTE — PROGRESS NOTES
Radiation Oncology On Treatment Visit    Patient Name:  Dina Mullins  MRN:  [id number]  :  1950    Referring Provider: No ref. provider found  Primary Care Provider: Ken Saleh PA-C  Care Team: Patient Care Team:  Ken Saleh PA-C as PCP - General (Internal Medicine)  Thelma Boudreaux APRN-CNP as PCP - [employer] PCP  Maine Birch MD as Consulting Physician (Hematology and Oncology)  Sherron Taylor RN as Registered Nurse (Hematology and Oncology)  Mei Black MD as Consulting Physician (Obstetrics and Gynecology)    Date of Service: 2024     Diagnosis:   Specialty Problems          Radiation Oncology Problems    Malignant neoplasm of overlapping sites of right female breast        Endometrial cancer (Multi)        Malignant neoplasm of overlapping sites of right female breast, unspecified estrogen receptor status         Treatment Summary:  3D CRT: Right Breast with lymph nodes    Treatment Period Technique Fraction Dose Fractions Total Dose   Course 1 2024-2024  (days elapsed: 28)         R_Breast_Axilla 2024-2024 3D 266 / 266 cGy  4256 / 4,256 cGy         R_TB_boost 2024-2024 3D 250 / 250 cGy  250 / 1,000 cGy     SUBJECTIVE: Urine shows findings consistent with urinary tract infection related to enteric bacilli.  The patient notes her uterine surgery is scheduled for early January.  The patient is currently using Udderly smooth for skin care.  Mild twinges of the right breast.    OBJECTIVE:   Vital Signs:  /72 (Patient Position: Sitting)   Pulse 58   Temp 36.6 °C (97.9 °F)   Resp 18   SpO2 98%     Other Pertinent Findings:     Toxicity Assessment          2024    14:30 2024    14:27 2024    14:51 2024    14:43   Toxicity Assessment   Treatment Site Breast Breast Breast Breast   Anorexia Grade 0 Grade 1 Grade 1 Grade 0   Anxiety Grade 1 Grade 0 Grade 0 Grade 0   Dehydration Grade 1  Grade 1 Grade 1 Grade 0   Depression Grade 1 Grade 1 Grade 1 Grade 0   Dermatitis Radiation Grade 0 Grade 0 Grade 1 Grade 1   Diarrhea Grade 0 Grade 0 Grade 0 Grade 0   Fatigue Grade 1 Grade 1 Grade 1 Grade 1   Fibrosis Deep Connective Tissue Grade 0 Grade 0 Grade 0 Grade 0   Fracture Grade 0 Grade 0 Grade 0 Grade 0   Nausea Grade 0 Grade 0 Grade 0 Grade 1   Pain Grade 0 Grade 0 Grade 0 Grade 0   Treatment Related Secondary Malignancy Grade 0 Grade 0 Grade 0 Grade 0   Tumor Pain Grade 0 Grade 0 Grade 0 Grade 0   Vomiting Grade 0 Grade 0 Grade 0 Grade 0   Abdominal Pain Grade 0  Grade 0 Grade 0   Dysphagia Grade 0  Grade 0 Grade 1   Esophagitis Grade 0  Grade 0 Grade 0   Gastric Hemorrhage Grade 0  Grade 0 Grade 0   Mucositis Oral Grade 0  Grade 0 Grade 0   Dry Mouth Grade 0  Grade 0 Grade 0   Breast Infection Grade 0 Grade 0 Grade 0 Grade 0   Seroma Grade 0 Grade 0 Grade 0 Grade 0   Joint Range of Motion Decreased Grade 0 Grade 0 Grade 0 Grade 0   Joint Range of Motion Decreased Lumbar Spine Grade 0 Grade 0 Grade 0 Grade 0   Brachial Plexopathy Grade 0 Grade 0 Grade 0 Grade 0   Breast Atrophy Grade 0 Grade 0 Grade 0 Grade 0   Breast Pain Grade 1       twinges Grade 1       twingegs Grade 1 Grade 1       twinges   Nipple Deformity Grade 0 Grade 0 Grade 0 Grade 0   Pneumonitis Grade 0 Grade 0 Grade 0 Grade 0   Edema Limbs Grade 0 Grade 0 Grade 0 Grade 0   Lymphedema Grade 0 Grade 0 Grade 0 Grade 0   Thromboembolic Event Grade 0 Grade 0 Grade 0 Grade 0   Hot Flashes Grade 0 Grade 0            Concurrent systemic therapy: None    Labs:   WBC   Date Value Ref Range Status   12/05/2024 5.1 4.4 - 11.3 x10*3/uL Final   09/13/2024 6.3 4.4 - 11.3 x10*3/uL Final     Hemoglobin   Date Value Ref Range Status   12/05/2024 11.6 (L) 12.0 - 16.0 g/dL Final   09/13/2024 11.2 (L) 12.0 - 16.0 g/dL Final     Hematocrit   Date Value Ref Range Status   12/05/2024 36.1 36.0 - 46.0 % Final   09/13/2024 35.3 (L) 36.0 - 46.0 % Final      Neutrophils Absolute   Date Value Ref Range Status   12/05/2024 3.99 1.60 - 5.50 x10*3/uL Final     Comment:     Percent differential counts (%) should be interpreted in the context of the absolute cell counts (cells/uL).   09/13/2024 4.30 1.60 - 5.50 x10*3/uL Final     Comment:     Percent differential counts (%) should be interpreted in the context of the absolute cell counts (cells/uL).     Platelets   Date Value Ref Range Status   12/05/2024 206 150 - 450 x10*3/uL Final   09/13/2024 243 150 - 450 x10*3/uL Final     Alkaline Phosphatase   Date Value Ref Range Status   09/13/2024 55 33 - 136 U/L Final   05/23/2024 50 35 - 125 U/L Final     AST   Date Value Ref Range Status   09/13/2024 11 9 - 39 U/L Final   05/23/2024 12 5 - 40 U/L Final     ALT   Date Value Ref Range Status   09/13/2024 7 7 - 45 U/L Final     Comment:     Patients treated with Sulfasalazine may generate falsely decreased results for ALT.   05/23/2024 11 5 - 40 U/L Final     Bilirubin, Total   Date Value Ref Range Status   09/13/2024 0.9 0.0 - 1.2 mg/dL Final   05/23/2024 0.7 0.1 - 1.2 mg/dL Final     Glucose   Date Value Ref Range Status   09/13/2024 143 (H) 74 - 99 mg/dL Final   05/23/2024 263 (H) 65 - 99 mg/dL Final     Calcium   Date Value Ref Range Status   09/13/2024 9.0 8.6 - 10.6 mg/dL Final   05/23/2024 9.2 8.5 - 10.4 mg/dL Final     Sodium   Date Value Ref Range Status   09/13/2024 139 136 - 145 mmol/L Final   05/23/2024 139 133 - 145 mmol/L Final     Potassium   Date Value Ref Range Status   09/13/2024 4.6 3.5 - 5.3 mmol/L Final   05/23/2024 4.1 3.4 - 5.1 mmol/L Final     Bicarbonate   Date Value Ref Range Status   09/13/2024 23 21 - 32 mmol/L Final   05/23/2024 23 (L) 24 - 31 mmol/L Final     Chloride   Date Value Ref Range Status   09/13/2024 105 98 - 107 mmol/L Final   05/23/2024 106 97 - 107 mmol/L Final     Urea Nitrogen   Date Value Ref Range Status   09/13/2024 26 (H) 6 - 23 mg/dL Final   05/23/2024 28 (H) 8 - 25 mg/dL Final      Creatinine   Date Value Ref Range Status   09/13/2024 1.42 (H) 0.50 - 1.05 mg/dL Final   05/23/2024 1.20 0.40 - 1.60 mg/dL Final         Exam: Grade 2 radiation dermatitis of the right breast.     Assessment / Plan:  The patient is tolerating radiation therapy as anticipated.  Continue per current treatment plan.       Therapies: Continue Udderly smooth for skin care.  Discussed possible use of cortisone absorptive powder in the right inframammary fold for retained moisture.    Side effects reviewed with patient. Images, chart and labs reviewed.    The patient will be seen approximately 4 weeks after uterine surgery for evaluation of acute toxicity from breast radiation therapy and discussion of possible adjuvant therapy options for her endometrial cancer.    Bib Buitrago MD

## 2024-12-20 NOTE — CPM/PAT H&P
CPM/PAT Evaluation       Name: Dina Mullins (Dina Mullins)  /Age: 1950/74 y.o.     {[facility] Visit Type:59517}      Chief Complaint: ***    HPI  Dina Mullins is scheduled for Hysterectomy Transvaginal Robot-Assisted with Phudhevn-Kqomkzkorrle-Leeztoztv, [place] lymph node mapping with biopsies, surgical staging, all indicated procedures- Bilateral with Dr. Black on 25.  Past Medical History:   Diagnosis Date    Anemia     Anxiety     Arthritis     Atrial fib/flutter, transient (Multi)     Bilateral tinnitus     Breast cancer (Multi)     Chronic kidney failure     stage three    Depression     Diverticulosis     DM (diabetes mellitus) (Multi)     Endometrial cancer (Multi)     Hyperlipidemia     Hypertension     Left arm pain     Parkinson disease (Multi)     Post-menopausal bleeding     Sepsis (Multi)     Septic shock (Multi) 2019    Stomach ulcer     Type 2 diabetes mellitus     Uterine cancer (Multi)     Vertigo     Vitamin D deficiency        Past Surgical History:   Procedure Laterality Date    BIOPSY      uterine    BREAST BIOPSY Right 2024    IDC    BREAST LUMPECTOMY Right 2024    BREAST LUMPECTOMY Right     LYMPH NODE DISSECTION Right 2024       Patient Sexual activity questions deferred to the physician.    Family History   Problem Relation Name Age of Onset    Cancer Mother      Breast cancer Mother  69    Alzheimer's disease Father         Allergies   Allergen Reactions    Codeine Nausea/vomiting       Prior to Admission medications    Medication Sig Start Date End Date Taking? Authorizing Provider   acetaminophen (TylenoL) 325 mg tablet Take 2 tablets (650 mg) by mouth every 4 hours if needed for moderate pain (4 - 6) or mild pain (1 - 3).    Historical Provider, MD   amoxicillin-pot clavulanate (Augmentin) 875-125 mg tablet Take 1 tablet by mouth 2 times a day for 7 days. 24  Bib Buitrago MD   atorvastatin (Lipitor) 40 mg tablet  "Take 1 tablet (40 mg) by mouth once daily at bedtime. 5/13/24   Ken Saleh PA-C   carbidopa-levodopa (Sinemet)  mg tablet Take 1 tablet by mouth 3 times a day. 11/25/24 11/25/25  Evaristo Johnson MD   diphenhydrAMINE-acetaminophen (Tylenol PM)  mg per tablet Take 1 tablet by mouth as needed at bedtime for sleep.  Patient not taking: Reported on 12/5/2024    Historical Provider, MD   DULoxetine (Cymbalta) 20 mg DR capsule Take 1 capsule (20 mg) by mouth once daily.  Patient not taking: Reported on 11/1/2024 8/12/24 2/8/25  Ken Saleh PA-C   fluticasone (Flonase) 50 mcg/actuation nasal spray Administer 1 spray into each nostril once daily as needed for allergies or rhinitis.  Patient not taking: Reported on 12/5/2024 10/23/23   Ken Saleh PA-C   furosemide (Lasix) 40 mg tablet Take 1 tablet (40 mg) by mouth once daily. 5/13/24 12/5/24  Ken Saleh PA-C   Lantus Solostar U-100 Insulin 100 unit/mL (3 mL) pen INJECT 25 UNITS UNDER THE SKIN ONCE DAILY IN THE MORNING 11/20/24   Ken Saleh PA-C   lisinopril 10 mg tablet TAKE ONE TABLET BY MOUTH ONCE A DAY 10/10/24   Ken Saleh PA-C   meclizine (Antivert) 25 mg tablet Take 1 tablet (25 mg) by mouth 3 times a day as needed for dizziness. 7/3/24   Ken Saleh PA-C   megestrol (Megace) 40 mg tablet Take 1 tablet (40 mg total) by mouth 2 times a day. 10/3/24 10/3/25  Mei Black MD   OneTouch Verio test strips strip 1 strip by in vitro route 4 times a day. And as needed 9/10/24   Ken Saleh PA-C   pen needle, diabetic 32 gauge x 5/32\" needle USE AS DIRECTED 12/9/24   Ken Saleh PA-C   rivaroxaban (Xarelto) 20 mg tablet Take 1 tablet (20 mg) by mouth once daily in the evening. Take with meals. 9/23/24   MD LINK Campos     PAT Physical Exam     Airway    Testing/Diagnostic:   NM PET CT FDG ONCOLOGY; 9/30/2024   IMPRESSION:  1. FDG avid right breast lesion and right axillary node, consistent  with known breast " cancer with chantale metastasis.  2. Intense FDG uptake in endometrial cavity, consistent with the  known endometrial cancer.  3. No PET evidence of distant metastasis.    US PELVIS TRANSABDOMINAL WITH TRANSVAGINAL; 9/13/2024   IMPRESSION:  Abnormal endometrial thickness of 1.1 cm with a 1.0 x 1.2 x 1.3 cm  solid and slightly hyperechoic submucosal mass demonstrated. This may  represent a submucosal leiomyoma but the possibility of an  endometrial polyp should be considered. The abnormal endometrial  thickness could indicate endometrial hyperplasia or even endometrial  carcinoma.    ECG; 4/4/24  Normal sinus rhythm at 65 bpm AR interval 140 ms right bundle branch block   with QRS duration 140 ms QTc 490 ms     Echo; 1/13/2020  There is normal left ventricular systolic function.   Estimated ejection fraction is 65-69%.   The left atrial size is mild to moderately dilated.   Right atrial cavity size is normal.   Mild mitral regurgitation.   Mild tricuspid regurgitation.   There is evidence of mild pulmonary hypertension with estimated  pulmonary artery systolic pressure of 44 mm Hg.    Patient Specialist/PCP:   PCP: Ken Saleh PA-C LOV 8/12/24  RadOnc: Bib Buitrago MD LOV 12/13/24  GynOnc: Mei Black MD LOV 12/5/24 seen for treatment planning discussion for endometrial cancer.  Neurology: Evaristo Johnson MD LOV 11/25/24 seen for history of parkinsonism   Cardiology: Rj Aburto MD LOV 4/2/24 history of paroxysmal atrial fibrillation in the setting of hypertension, diabetes, and obesity.   Visit Vitals  OB Status Postmenopausal   Smoking Status Never       DASI Risk Score      Flowsheet Row Pre-Admission Testing [facility] 8/8/[facility] in [facility]   Can you take care of yourself (eat, dress, bathe, or use toilet)?  2.75 filed at 08/08/2024 1008   Can you walk indoors, such as around your house? 1.75 filed at 08/08/2024 1008   Can you walk a block or two on level ground?  2.75 filed at 08/08/2024  1008   Can you climb a flight of stairs or walk up a hill? 5.5 filed at 08/08/2024 1008   Can you run a short distance? 0 filed at 08/08/2024 1008   Can you do light work around the house like dusting or washing dishes? 2.7 filed at 08/08/2024 1008   Can you do moderate work around the house like vacuuming, sweeping floors or carrying groceries? 0 filed at 08/08/2024 1008   Can you do heavy work around the house like scrubbing floors or lifting and moving heavy furniture?  0 filed at 08/08/2024 1008   Can you do yard work like raking leaves, weeding or pushing a mower? 0 filed at 08/08/2024 1008   Can you have sexual relations? 0 filed at 08/08/2024 1008   Can you participate in moderate recreational activities like golf, bowling, dancing, doubles tennis or throwing a baseball or football? 0 filed at 08/08/2024 1008   Can you participate in strenous sports like swimming, singles tennis, football, basketball, or skiing? 0 filed at 08/08/2024 1008   DASI SCORE 15.45 filed at 08/08/2024 1008   METS Score (Will be calculated only when all the questions are answered) 4.6 filed at 08/08/2024 1008          Caprini DVT Assessment    No data to display       Modified Frailty Index    No data to display       CHADS2 Stroke Risk  Current as of 7 hours ago        5.9% 3 to 100%: High Risk   2 to < 3%: Medium Risk   0 to < 2%: Low Risk     No Change          This score determines the patient's risk of having a stroke if the patient has atrial fibrillation.          Points Metrics   0 Has Congestive Heart Failure:  No     Patients with congestive heart failure get 1 point.    Current as of 7 hours ago   1 Has Hypertension:  Yes     Patients with hypertension get 1 point.    Current as of 7 hours ago   1 Age:  74     Patients who are 75 years of age or older get 1 point.    Current as of 7 hours ago   1 Has Diabetes Excluding Gestational Diabetes:  Yes     Patients with diabetes get 1 point.    Current as of 7 hours ago   0 Had  Stroke:  No  Had TIA:  No  Had Thromboembolism:  No     Patients who have had a stroke, TIA, or thromboembolism get 2 points.    Current as of 7 hours ago             Revised Cardiac Risk Index      Flowsheet Row Pre-Admission Testing from 8/8/2024 in [facility]   High-Risk Surgery (Intraperitoneal, Intrathoracic,Suprainguinal vascular) 0 filed at 08/08/2024 1019   History of ischemic heart disease (History of MI, History of positive exercuse test, Current chest paint considered due to myocardial ischemia, Use of nitrate therapy, ECG with pathological Q Waves) 0 filed at 08/08/2024 1019   History of congestive heart failure (pulmonary edemia, bilateral rales or S3 gallop, Paroxysmal nocturnal dyspnea, CXR showing pulmonary vascular redistribution) 0 filed at 08/08/2024 1019   History of cerebrovascular disease (Prior TIA or stroke) 0 filed at 08/08/2024 1019   Pre-operative insulin treatment 0 filed at 08/08/2024 1019   Pre-operative creatinine>2 mg/dl 0 filed at 08/08/2024 1019   Revised Cardiac Risk Calculator 0 filed at 08/08/2024 1019          Apfel Simplified Score    No data to display       Risk Analysis Index Results This Encounter    No data found in the last 10 encounters.       Stop Bang Score      Flowsheet Row Pre-Admission Testing from 8/8/2024 in [facility]   Do you snore loudly? 0 filed at 08/08/2024 1009   Do you often feel tired or fatigued after your sleep? 1 filed at 08/08/2024 1009   Has anyone ever observed you stop breathing in your sleep? 0 filed at 08/08/2024 1009   Do you have or are you being treated for high blood pressure? 1 filed at 08/08/2024 1009   Recent BMI (Calculated) 47.9 filed at 08/08/2024 1009   Is BMI greater than 35 kg/m2? 1=Yes filed at 08/08/2024 1009   Age older than 50 years old? 1=Yes filed at 08/08/2024 1009   Is your neck circumference greater than 17 inches (Male) or 16 inches (Female)? 1 filed at 08/08/2024 1009   Gender - Male 0=No  filed at 08/08/2024 1009   STOP-BANG Total Score 5 filed at 08/08/2024 1009          Prodigy: High Risk  Total Score: 12              Prodigy Age Score           ARISCAT Score for Postoperative Pulmonary Complications    No data to display       [facility] Perioperative Risk for Myocardial Infarction or Cardiac Arrest (ARI)    No data to display         Assessment and Plan:    ONLY  Risk Stratification faxed to cardiology office on 12/20/24.  Neli Kelly RN  Pre-Admission Testing   {[facility] EMBEDDED ASSESSMENT AND PLAN:61711}

## 2024-12-21 LAB — BACTERIA UR CULT: ABNORMAL

## 2024-12-23 ENCOUNTER — HOSPITAL ENCOUNTER (OUTPATIENT)
Dept: RADIATION ONCOLOGY | Facility: CLINIC | Age: 74
Setting detail: RADIATION/ONCOLOGY SERIES
Discharge: HOME | End: 2024-12-23
Payer: MEDICARE

## 2024-12-23 DIAGNOSIS — C50.811 MALIGNANT NEOPLASM OF OVERLAPPING SITES OF RIGHT FEMALE BREAST: ICD-10-CM

## 2024-12-23 DIAGNOSIS — Z51.0 ENCOUNTER FOR ANTINEOPLASTIC RADIATION THERAPY: ICD-10-CM

## 2024-12-23 DIAGNOSIS — C50.411 MALIGNANT NEOPLASM OF UPPER-OUTER QUADRANT OF RIGHT FEMALE BREAST: ICD-10-CM

## 2024-12-23 LAB
RAD ONC MSQ ACTUAL FRACTIONS DELIVERED: 2
RAD ONC MSQ ACTUAL SESSION DELIVERED DOSE: 250 CGRAY
RAD ONC MSQ ACTUAL TOTAL DOSE: 500 CGRAY
RAD ONC MSQ ELAPSED DAYS: 3
RAD ONC MSQ LAST DATE: NORMAL
RAD ONC MSQ PRESCRIBED FRACTIONAL DOSE: 250 CGRAY
RAD ONC MSQ PRESCRIBED NUMBER OF FRACTIONS: 4
RAD ONC MSQ PRESCRIBED TECHNIQUE: NORMAL
RAD ONC MSQ PRESCRIBED TOTAL DOSE: 1000 CGRAY
RAD ONC MSQ PRESCRIPTION PATTERN COMMENT: NORMAL
RAD ONC MSQ START DATE: NORMAL
RAD ONC MSQ TREATMENT COURSE NUMBER: 1
RAD ONC MSQ TREATMENT SITE: NORMAL

## 2024-12-23 PROCEDURE — 77412 RADIATION TX DELIVERY LVL 3: CPT | Performed by: STUDENT IN AN ORGANIZED HEALTH CARE EDUCATION/TRAINING PROGRAM

## 2024-12-23 PROCEDURE — 77387 GUIDANCE FOR RADJ TX DLVR: CPT | Performed by: STUDENT IN AN ORGANIZED HEALTH CARE EDUCATION/TRAINING PROGRAM

## 2024-12-24 ENCOUNTER — HOSPITAL ENCOUNTER (OUTPATIENT)
Dept: RADIATION ONCOLOGY | Facility: CLINIC | Age: 74
Setting detail: RADIATION/ONCOLOGY SERIES
Discharge: HOME | End: 2024-12-24
Payer: MEDICARE

## 2024-12-24 DIAGNOSIS — N30.00 ACUTE CYSTITIS WITHOUT HEMATURIA: Primary | ICD-10-CM

## 2024-12-24 DIAGNOSIS — C50.811 MALIGNANT NEOPLASM OF OVERLAPPING SITES OF RIGHT FEMALE BREAST: ICD-10-CM

## 2024-12-24 DIAGNOSIS — C50.411 MALIGNANT NEOPLASM OF UPPER-OUTER QUADRANT OF RIGHT FEMALE BREAST: ICD-10-CM

## 2024-12-24 DIAGNOSIS — Z51.0 ENCOUNTER FOR ANTINEOPLASTIC RADIATION THERAPY: ICD-10-CM

## 2024-12-24 LAB
RAD ONC MSQ ACTUAL FRACTIONS DELIVERED: 3
RAD ONC MSQ ACTUAL SESSION DELIVERED DOSE: 250 CGRAY
RAD ONC MSQ ACTUAL TOTAL DOSE: 750 CGRAY
RAD ONC MSQ ELAPSED DAYS: 4
RAD ONC MSQ LAST DATE: NORMAL
RAD ONC MSQ PRESCRIBED FRACTIONAL DOSE: 250 CGRAY
RAD ONC MSQ PRESCRIBED NUMBER OF FRACTIONS: 4
RAD ONC MSQ PRESCRIBED TECHNIQUE: NORMAL
RAD ONC MSQ PRESCRIBED TOTAL DOSE: 1000 CGRAY
RAD ONC MSQ PRESCRIPTION PATTERN COMMENT: NORMAL
RAD ONC MSQ START DATE: NORMAL
RAD ONC MSQ TREATMENT COURSE NUMBER: 1
RAD ONC MSQ TREATMENT SITE: NORMAL

## 2024-12-24 PROCEDURE — 77336 RADIATION PHYSICS CONSULT: CPT | Performed by: STUDENT IN AN ORGANIZED HEALTH CARE EDUCATION/TRAINING PROGRAM

## 2024-12-24 PROCEDURE — 77387 GUIDANCE FOR RADJ TX DLVR: CPT | Performed by: STUDENT IN AN ORGANIZED HEALTH CARE EDUCATION/TRAINING PROGRAM

## 2024-12-24 PROCEDURE — 77412 RADIATION TX DELIVERY LVL 3: CPT | Performed by: STUDENT IN AN ORGANIZED HEALTH CARE EDUCATION/TRAINING PROGRAM

## 2024-12-24 RX ORDER — NITROFURANTOIN 25; 75 MG/1; MG/1
100 CAPSULE ORAL 2 TIMES DAILY
Qty: 14 CAPSULE | Refills: 0 | Status: SHIPPED | OUTPATIENT
Start: 2024-12-24 | End: 2024-12-31

## 2024-12-26 ENCOUNTER — DOCUMENTATION (OUTPATIENT)
Dept: RADIATION ONCOLOGY | Facility: CLINIC | Age: 74
End: 2024-12-26

## 2024-12-26 ENCOUNTER — HOSPITAL ENCOUNTER (OUTPATIENT)
Dept: RADIATION ONCOLOGY | Facility: CLINIC | Age: 74
Setting detail: RADIATION/ONCOLOGY SERIES
Discharge: HOME | End: 2024-12-26
Payer: MEDICARE

## 2024-12-26 DIAGNOSIS — Z51.0 ENCOUNTER FOR ANTINEOPLASTIC RADIATION THERAPY: ICD-10-CM

## 2024-12-26 DIAGNOSIS — C50.811 MALIGNANT NEOPLASM OF OVERLAPPING SITES OF RIGHT FEMALE BREAST: ICD-10-CM

## 2024-12-26 DIAGNOSIS — C50.411 MALIGNANT NEOPLASM OF UPPER-OUTER QUADRANT OF RIGHT FEMALE BREAST: ICD-10-CM

## 2024-12-26 LAB
RAD ONC MSQ ACTUAL FRACTIONS DELIVERED: 4
RAD ONC MSQ ACTUAL SESSION DELIVERED DOSE: 250 CGRAY
RAD ONC MSQ ACTUAL TOTAL DOSE: 1000 CGRAY
RAD ONC MSQ ELAPSED DAYS: 6
RAD ONC MSQ LAST DATE: NORMAL
RAD ONC MSQ PRESCRIBED FRACTIONAL DOSE: 250 CGRAY
RAD ONC MSQ PRESCRIBED NUMBER OF FRACTIONS: 4
RAD ONC MSQ PRESCRIBED TECHNIQUE: NORMAL
RAD ONC MSQ PRESCRIBED TOTAL DOSE: 1000 CGRAY
RAD ONC MSQ PRESCRIPTION PATTERN COMMENT: NORMAL
RAD ONC MSQ START DATE: NORMAL
RAD ONC MSQ TREATMENT COURSE NUMBER: 1
RAD ONC MSQ TREATMENT SITE: NORMAL

## 2024-12-26 PROCEDURE — 77412 RADIATION TX DELIVERY LVL 3: CPT | Performed by: STUDENT IN AN ORGANIZED HEALTH CARE EDUCATION/TRAINING PROGRAM

## 2024-12-26 PROCEDURE — 77387 GUIDANCE FOR RADJ TX DLVR: CPT | Performed by: STUDENT IN AN ORGANIZED HEALTH CARE EDUCATION/TRAINING PROGRAM

## 2024-12-27 ENCOUNTER — PRE-ADMISSION TESTING (OUTPATIENT)
Dept: PREADMISSION TESTING | Facility: HOSPITAL | Age: 74
End: 2024-12-27
Payer: MEDICARE

## 2024-12-27 VITALS
HEART RATE: 65 BPM | WEIGHT: 247 LBS | OXYGEN SATURATION: 100 % | BODY MASS INDEX: 45.45 KG/M2 | TEMPERATURE: 97.5 F | SYSTOLIC BLOOD PRESSURE: 137 MMHG | DIASTOLIC BLOOD PRESSURE: 64 MMHG | HEIGHT: 62 IN

## 2024-12-27 DIAGNOSIS — N95.0 POST-MENOPAUSAL BLEEDING: ICD-10-CM

## 2024-12-27 DIAGNOSIS — R01.1 MURMUR, HEART: Primary | ICD-10-CM

## 2024-12-27 DIAGNOSIS — C54.1 ENDOMETRIAL CANCER (MULTI): ICD-10-CM

## 2024-12-27 LAB
ABO GROUP (TYPE) IN BLOOD: NORMAL
ANION GAP SERPL CALC-SCNC: 15 MMOL/L (ref 10–20)
ANTIBODY SCREEN: NORMAL
BUN SERPL-MCNC: 23 MG/DL (ref 6–23)
CALCIUM SERPL-MCNC: 9 MG/DL (ref 8.6–10.6)
CHLORIDE SERPL-SCNC: 110 MMOL/L (ref 98–107)
CO2 SERPL-SCNC: 20 MMOL/L (ref 21–32)
CREAT SERPL-MCNC: 1.3 MG/DL (ref 0.5–1.05)
EGFRCR SERPLBLD CKD-EPI 2021: 43 ML/MIN/1.73M*2
ERYTHROCYTE [DISTWIDTH] IN BLOOD BY AUTOMATED COUNT: 14.2 % (ref 11.5–14.5)
GLUCOSE SERPL-MCNC: 138 MG/DL (ref 74–99)
HCT VFR BLD AUTO: 34 % (ref 36–46)
HGB BLD-MCNC: 10.8 G/DL (ref 12–16)
MCH RBC QN AUTO: 30.1 PG (ref 26–34)
MCHC RBC AUTO-ENTMCNC: 31.8 G/DL (ref 32–36)
MCV RBC AUTO: 95 FL (ref 80–100)
NRBC BLD-RTO: 0 /100 WBCS (ref 0–0)
PLATELET # BLD AUTO: 202 X10*3/UL (ref 150–450)
POTASSIUM SERPL-SCNC: 4 MMOL/L (ref 3.5–5.3)
RBC # BLD AUTO: 3.59 X10*6/UL (ref 4–5.2)
RH FACTOR (ANTIGEN D): NORMAL
SODIUM SERPL-SCNC: 141 MMOL/L (ref 136–145)
WBC # BLD AUTO: 5.4 X10*3/UL (ref 4.4–11.3)

## 2024-12-27 PROCEDURE — 36415 COLL VENOUS BLD VENIPUNCTURE: CPT

## 2024-12-27 PROCEDURE — 99204 OFFICE O/P NEW MOD 45 MIN: CPT | Performed by: NURSE PRACTITIONER

## 2024-12-27 PROCEDURE — 85027 COMPLETE CBC AUTOMATED: CPT

## 2024-12-27 PROCEDURE — 86850 RBC ANTIBODY SCREEN: CPT

## 2024-12-27 PROCEDURE — 80048 BASIC METABOLIC PNL TOTAL CA: CPT

## 2024-12-27 RX ORDER — CHLORHEXIDINE GLUCONATE 40 MG/ML
SOLUTION TOPICAL DAILY PRN
Qty: 473 ML | Refills: 0 | Status: SHIPPED | OUTPATIENT
Start: 2024-12-27 | End: 2025-01-01

## 2024-12-27 ASSESSMENT — DUKE ACTIVITY SCORE INDEX (DASI)
CAN YOU DO MODERATE WORK AROUND THE HOUSE LIKE VACUUMING, SWEEPING FLOORS OR CARRYING GROCERIES: NO
TOTAL_SCORE: 13.45
CAN YOU DO YARD WORK LIKE RAKING LEAVES, WEEDING OR PUSHING A MOWER: NO
CAN YOU DO MODERATE WORK AROUND THE HOUSE LIKE VACUUMING, SWEEPING FLOORS OR CARRYING GROCERIES: YES
CAN YOU CLIMB A FLIGHT OF STAIRS OR WALK UP A HILL: NO
CAN YOU WALK INDOORS, SUCH AS AROUND YOUR HOUSE: YES
TOTAL_SCORE: 4.5
CAN YOU CLIMB A FLIGHT OF STAIRS OR WALK UP A HILL: NO
CAN YOU DO YARD WORK LIKE RAKING LEAVES, WEEDING OR PUSHING A MOWER: NO
CAN YOU PARTICIPATE IN STRENOUS SPORTS LIKE SWIMMING, SINGLES TENNIS, FOOTBALL, BASKETBALL, OR SKIING: NO
CAN YOU WALK A BLOCK OR TWO ON LEVEL GROUND: YES
CAN YOU DO LIGHT WORK AROUND THE HOUSE LIKE DUSTING OR WASHING DISHES: YES
CAN YOU HAVE SEXUAL RELATIONS: NO
CAN YOU PARTICIPATE IN MODERATE RECREATIONAL ACTIVITIES LIKE GOLF, BOWLING, DANCING, DOUBLES TENNIS OR THROWING A BASEBALL OR FOOTBALL: NO
CAN YOU PARTICIPATE IN STRENOUS SPORTS LIKE SWIMMING, SINGLES TENNIS, FOOTBALL, BASKETBALL, OR SKIING: NO
CAN YOU TAKE CARE OF YOURSELF (EAT, DRESS, BATHE, OR USE TOILET): YES
DASI METS SCORE: 4.4
CAN YOU RUN A SHORT DISTANCE: NO
CAN YOU RUN A SHORT DISTANCE: NO
CAN YOU DO HEAVY WORK AROUND THE HOUSE LIKE SCRUBBING FLOORS OR LIFTING AND MOVING HEAVY FURNITURE: NO
CAN YOU DO LIGHT WORK AROUND THE HOUSE LIKE DUSTING OR WASHING DISHES: NO
DASI METS SCORE: 3.3
CAN YOU WALK A BLOCK OR TWO ON LEVEL GROUND: NO
CAN YOU PARTICIPATE IN MODERATE RECREATIONAL ACTIVITIES LIKE GOLF, BOWLING, DANCING, DOUBLES TENNIS OR THROWING A BASEBALL OR FOOTBALL: NO
CAN YOU HAVE SEXUAL RELATIONS: NO
CAN YOU DO HEAVY WORK AROUND THE HOUSE LIKE SCRUBBING FLOORS OR LIFTING AND MOVING HEAVY FURNITURE: NO
CAN YOU WALK INDOORS, SUCH AS AROUND YOUR HOUSE: YES
CAN YOU TAKE CARE OF YOURSELF (EAT, DRESS, BATHE, OR USE TOILET): YES

## 2024-12-27 ASSESSMENT — ENCOUNTER SYMPTOMS
NECK NEGATIVE: 1
ENDOCRINE NEGATIVE: 1
CARDIOVASCULAR NEGATIVE: 1
RESPIRATORY NEGATIVE: 1
GASTROINTESTINAL NEGATIVE: 1
CONSTITUTIONAL NEGATIVE: 1
VERTIGO: 1
ARTHRALGIAS: 1

## 2024-12-27 ASSESSMENT — LIFESTYLE VARIABLES: SMOKING_STATUS: NONSMOKER

## 2024-12-27 NOTE — CPM/PAT H&P
CPM/PAT Evaluation       Name: Dina Mullins (Dina Mullins)  /Age: 1950/74 y.o.     Visit Type:   In-Person       Chief Complaint:   Post-menopausal bleeding  Endometrial cancer (Multi)    HPI  Pt is a 74 year old female with endometrial cancer being evaluated in CPM in anticipation of a Robot-Assisted Transvaginal Hysterectomy with Salpingo-Oophorectomy, [place] lymph node mapping with biopsies, surgical staging, and all indicated procedures with Dr. Black on 25.  Past Medical History:   Diagnosis Date    Anemia     Anxiety     Arthritis     Atrial fib/flutter, transient (Multi)     Bilateral tinnitus     Breast cancer (Multi)     Chronic kidney failure     stage three    Depression     Diverticulosis     DM (diabetes mellitus) (Multi)     Endometrial cancer (Multi)     Hyperlipidemia     Hypertension     Left arm pain     Parkinson disease (Multi)     Post-menopausal bleeding     Sepsis (Multi)     Septic shock (Multi) 2019    Stomach ulcer     Type 2 diabetes mellitus     Uterine cancer (Multi)     Vertigo     Vitamin D deficiency        Past Surgical History:   Procedure Laterality Date    BIOPSY      uterine    BREAST BIOPSY Right 2024    IDC    BREAST LUMPECTOMY Right 2024    BREAST LUMPECTOMY Right     LYMPH NODE DISSECTION Right 2024       Patient Sexual activity questions deferred to the physician.    Family History   Problem Relation Name Age of Onset    Cancer Mother      Breast cancer Mother  69    Alzheimer's disease Father         Allergies   Allergen Reactions    Codeine Nausea/vomiting       Prior to Admission medications    Medication Sig Start Date End Date Taking? Authorizing Provider   acetaminophen (TylenoL) 325 mg tablet Take 2 tablets (650 mg) by mouth every 4 hours if needed for moderate pain (4 - 6) or mild pain (1 - 3).    Historical Provider, MD   amoxicillin-pot clavulanate (Augmentin) 875-125 mg tablet Take 1 tablet by mouth 2 times a  "day for 7 days. 12/19/24 12/26/24  Bib Buitrago MD   atorvastatin (Lipitor) 40 mg tablet Take 1 tablet (40 mg) by mouth once daily at bedtime. 5/13/24   Ken Saleh PA-C   carbidopa-levodopa (Sinemet)  mg tablet Take 1 tablet by mouth 3 times a day. 11/25/24 11/25/25  Evaristo Johnson MD   diphenhydrAMINE-acetaminophen (Tylenol PM)  mg per tablet Take 1 tablet by mouth as needed at bedtime for sleep.  Patient not taking: Reported on 12/5/2024    Historical Provider, MD   DULoxetine (Cymbalta) 20 mg DR capsule Take 1 capsule (20 mg) by mouth once daily.  Patient not taking: Reported on 11/1/2024 8/12/24 2/8/25  Ken Saleh PA-C   fluticasone (Flonase) 50 mcg/actuation nasal spray Administer 1 spray into each nostril once daily as needed for allergies or rhinitis.  Patient not taking: Reported on 12/5/2024 10/23/23   Ken Saleh PA-C   furosemide (Lasix) 40 mg tablet Take 1 tablet (40 mg) by mouth once daily. 5/13/24 12/5/24  Ken Saleh PA-C   Lantus Solostar U-100 Insulin 100 unit/mL (3 mL) pen INJECT 25 UNITS UNDER THE SKIN ONCE DAILY IN THE MORNING 11/20/24   Ken Saleh PA-C   lisinopril 10 mg tablet TAKE ONE TABLET BY MOUTH ONCE A DAY 10/10/24   Ken Saleh PA-C   meclizine (Antivert) 25 mg tablet Take 1 tablet (25 mg) by mouth 3 times a day as needed for dizziness. 7/3/24   Ken Saleh PA-C   megestrol (Megace) 40 mg tablet Take 1 tablet (40 mg total) by mouth 2 times a day. 10/3/24 10/3/25  Mei Black MD   nitrofurantoin, macrocrystal-monohydrate, (Macrobid) 100 mg capsule Take 1 capsule (100 mg) by mouth 2 times a day for 7 days. 12/24/24 12/31/24  Bib Buitrago MD   OneTouch Verio test strips strip 1 strip by in vitro route 4 times a day. And as needed 9/10/24   Ken Saleh PA-C   pen needle, diabetic 32 gauge x 5/32\" needle USE AS DIRECTED 12/9/24   Ken Saleh PA-C   rivaroxaban (Xarelto) 20 mg tablet Take 1 tablet (20 mg) by mouth once daily in the evening. " Take with meals. 9/23/24   Rj Aburto MD        PAT ROS:   Constitutional:   neg    Neuro/Psych:   Eyes:    visual disturbance  Ears:    vertigo   tinnitus  Nose:   neg    Mouth:   neg    Throat:   neg    Neck:   neg    Cardio:   neg    Respiratory:   neg    Endocrine:   neg    GI:   neg    :   neg    Musculoskeletal:    arthralgias  Hematologic:   neg    Skin:  neg        Physical Exam  Vitals reviewed.   Constitutional:       Appearance: She is obese.   HENT:      Head: Normocephalic and atraumatic.      Nose: Nose normal.      Mouth/Throat:      Mouth: Mucous membranes are moist.   Cardiovascular:      Rate and Rhythm: Normal rate and regular rhythm.      Pulses: Normal pulses.      Heart sounds: Normal heart sounds.   Pulmonary:      Effort: Pulmonary effort is normal.      Breath sounds: Normal breath sounds.   Abdominal:      Palpations: Abdomen is soft.   Musculoskeletal:         General: Normal range of motion.      Cervical back: Normal range of motion.   Skin:     General: Skin is warm.   Neurological:      General: No focal deficit present.      Mental Status: She is alert and oriented to person, place, and time.      Gait: Gait abnormal.   Psychiatric:         Mood and Affect: Mood normal.         Behavior: Behavior normal.          PAT AIRWAY:   Airway:     Mallampati::  II    TM distance::  >3 FB    Neck ROM::  Full  normal        Testing/Diagnostic:     Patient Specialist/PCP:     Visit Vitals  OB Status Postmenopausal   Smoking Status Never       DASI Risk Score      Flowsheet Row Pre-Admission Testing from 8/8/2024 in [facility]   Can you take care of yourself (eat, dress, bathe, or use toilet)?  2.75 filed at 08/08/2024 1008   Can you walk indoors, such as around your house? 1.75 filed at 08/08/2024 1008   Can you walk a block or two on level ground?  2.75 filed at 08/08/2024 1008   Can you climb a flight of stairs or walk up a hill? 5.5 filed at 08/08/2024 1008   Can you  run a short distance? 0 filed at 08/08/2024 1008   Can you do light work around the house like dusting or washing dishes? 2.7 filed at 08/08/2024 1008   Can you do moderate work around the house like vacuuming, sweeping floors or carrying groceries? 0 filed at 08/08/2024 1008   Can you do heavy work around the house like scrubbing floors or lifting and moving heavy furniture?  0 filed at 08/08/2024 1008   Can you do yard work like raking leaves, weeding or pushing a mower? 0 filed at 08/08/2024 1008   Can you have sexual relations? 0 filed at 08/08/2024 1008   Can you participate in moderate recreational activities like golf, bowling, dancing, doubles tennis or throwing a baseball or football? 0 filed at 08/08/2024 1008   Can you participate in strenous sports like swimming, singles tennis, football, basketball, or skiing? 0 filed at 08/08/2024 1008   DASI SCORE 15.45 filed at 08/08/2024 1008   METS Score (Will be calculated only when all the questions are answered) 4.6 filed at 08/08/2024 1008          Caprini DVT Assessment    No data to display       Modified Frailty Index    No data to display       CHADS2 Stroke Risk  Current as of 51 minutes ago        5.9% 3 to 100%: High Risk   2 to < 3%: Medium Risk   0 to < 2%: Low Risk     No Change          This score determines the patient's risk of having a stroke if the patient has atrial fibrillation.          Points Metrics   0 Has Congestive Heart Failure:  No     Patients with congestive heart failure get 1 point.    Current as of 51 minutes ago   1 Has Hypertension:  Yes     Patients with hypertension get 1 point.    Current as of 51 minutes ago   1 Age:  74     Patients who are 75 years of age or older get 1 point.    Current as of 51 minutes ago   1 Has Diabetes Excluding Gestational Diabetes:  Yes     Patients with diabetes get 1 point.    Current as of 51 minutes ago   0 Had Stroke:  No  Had TIA:  No  Had Thromboembolism:  No     Patients who have had a  stroke, TIA, or thromboembolism get 2 points.    Current as of 51 minutes ago             Revised Cardiac Risk Index      Flowsheet Row Pre-Admission Testing from 8/8/2024 in [facility]   High-Risk Surgery (Intraperitoneal, Intrathoracic,Suprainguinal vascular) 0 filed at 08/08/2024 1019   History of ischemic heart disease (History of MI, History of positive exercuse test, Current chest paint considered due to myocardial ischemia, Use of nitrate therapy, ECG with pathological Q Waves) 0 filed at 08/08/2024 1019   History of congestive heart failure (pulmonary edemia, bilateral rales or S3 gallop, Paroxysmal nocturnal dyspnea, CXR showing pulmonary vascular redistribution) 0 filed at 08/08/2024 1019   History of cerebrovascular disease (Prior TIA or stroke) 0 filed at 08/08/2024 1019   Pre-operative insulin treatment 0 filed at 08/08/2024 1019   Pre-operative creatinine>2 mg/dl 0 filed at 08/08/2024 1019   Revised Cardiac Risk Calculator 0 filed at 08/08/2024 1019          Apfel Simplified Score    No data to display       Risk Analysis Index Results This Encounter    No data found in the last 10 encounters.       Stop Bang Score      Flowsheet Row Pre-Admission Testing from 8/8/2024 in [facility]   Do you snore loudly? 0 filed at 08/08/2024 1009   Do you often feel tired or fatigued after your sleep? 1 filed at 08/08/2024 1009   Has anyone ever observed you stop breathing in your sleep? 0 filed at 08/08/2024 1009   Do you have or are you being treated for high blood pressure? 1 filed at 08/08/2024 1009   Recent BMI (Calculated) 47.9 filed at 08/08/2024 1009   Is BMI greater than 35 kg/m2? 1=Yes filed at 08/08/2024 1009   Age older than 50 years old? 1=Yes filed at 08/08/2024 1009   Is your neck circumference greater than 17 inches (Male) or 16 inches (Female)? 1 filed at 08/08/2024 1009   Gender - Male 0=No filed at 08/08/2024 1009   STOP-BANG Total Score 5 filed at 08/08/2024 1009           Prodigy: High Risk  Total Score: 12              Prodigy Age Score           ARISCAT Score for Postoperative Pulmonary Complications    No data to display       Srikanth Perioperative Risk for Myocardial Infarction or Cardiac Arrest (ARI)    No data to display         Assessment and Plan:     Anesthesia  The patient denies problems with anesthesia in the past such as PONV, prolonged sedation, awareness, dental damage, aspiration, cardiac arrest, difficult intubation, or unexpected hospital admissions.     Neurology  The patient has no neurological diagnoses or significant findings on chart review, clinical presentation, and evaluation. No grossly apparent neurological perioperative risk. The patient is at increased risk for postoperative delirium secondary to age 65 or older, decreased functional status, renal Insufficiency. The patient is at increased risk for perioperative stroke secondary to a-fib, hypertension , perioperative interruption of antithrombotic, increased age, hyperlipidemia, diabetes mellitus, general anesthesia, operative time >2.5 hours. Handouts for preoperative brain exercises given to patient.    HEENT/Airway  No diagnoses, significant findings on chart review, clinical presentation, or evaluation. No documented or reported history of airway difficulty.     Cardiovascular  The patient is scheduled for non-cardiac surgery associated with elevated risk. The patient has no major cardiac contraindications to non- cardiac surgery.  RCRI  The patient meets 2 RCRI criteria and therefore has a 10.1% risk (elevated) of major adverse cardiac complications.  METS  The patient's functional capacity capacity is less than 4 METS.  EKG  The patient has no EKG or echocardiographic changes concerning for myocardial ischemia.   Heart Failure  The patient has no known history of heart failure.  Additionally, the patient reports no symptoms of heart failure and demonstrates no signs of heart  failure.  Hypertension Evaluation  The patient has a known history of hypertension that is controlled.  Patient's hypertension is most consistent with stage 1.  Heart Rhythm Evaluation  Patient has a history of Afib which is Paroxysmal and is treated by anticoagulation  Heart Valve Evaluation  The patient has a known history of valvular heart disease.  Per patient's prior studies, the patient has mild MVR and TVR  Cardiology Evaluation  The patient follows with cardiology, Dr. Aburto. Patient was last seen 4-2-24. Per note,  The patient is doing well with no major recurrent arrhythmias.   The patient has a 30-day risk for MACE of 2 predictors, 10.1% risk for cardiac death, nonfatal myocardial infarction, and nonfatal cardiac arrest.  ARI score which indicates a 0.2% risk of intraoperative or 30-day postoperative MACE (major adverse cardiac event).    Pulmonary  No significant findings on chart review or clinical presentation and evaluation. The patient is at increased risk of perioperative pulmonary complications secondary to advanced age greater than 60, functional dependency, morbid obesity.    The patient has a stop bang score of 3, which places patient at intermediate risk for having KRISTEN.    ARISCAT 26, Intermediate, 13.3% risk of in-hospital postoperative pulmonary complications  PRODIGY 12, intermediate risk of respiratory depression episode. Patient given PI sheet for preoperative deep breathing exercises.    Hematology  No diagnoses or significant findings on chart review or clinical presentation and evaluation.  Antiplatelet management   The patient is not currently receiving antiplatelet therapy.  Anticoagulation management  The patient is currently receiving anticoagulation therapy for afib.    Caprini score 12, high risk of perioperative VTE.     Patient instructed to ambulate as soon as possible postoperatively to decrease thromboembolic risk. Initiate mechanical DVT prophylaxis as soon as possible  and initiate chemical prophylaxis when deemed safe from a bleeding standpoint post surgery.     Transfusion Evaluation  A type and screen was obtained given the likelihood for perioperative transfusion of blood or blood products.    Gastrointestinal  The patient has diverticulosis, PUD    Eat 10- 0,  self-perceived oropharyngeal dysphagia scale (0-40)     Genitourinary  No diagnoses or significant findings on chart review or clinical presentation and evaluation.    Renal  The patient has a history of chronic kidney disease most consistent with stage 3b.  Patient's renal function appears unchanged when compared to prior labs. The patient has specific risk factors associated with increased risk of perioperative renal complications related to age greater than 55, male gender, hypertension, diabetes mellitus, CKD.    Musculoskeletal  The patient has osteoarthritis    Endocrine  Diabetes Evaluation  The patient has a history of diabetes mellitus that currently appears controlled.  Thyroid Disease Evaluation  The patient has no history of thyroid disease.    ID  No diagnoses or significant findings on chart review or clinical presentation and evaluation. MRSA screening obtained. Prescriptions and instructions given for Hibiclens and Peridex.    -Preoperative medication instructions were provided and reviewed with the patient.  Any additional testing or evaluation was explained to the patient.  NPO Instructions were discussed, and the patient's questions were answered prior to conclusion of this encounter. Patient verbalized understanding of preoperative instructions. After Visit Summary given.         Recent Results (from the past 72 hours)   Basic Metabolic Panel    Collection Time: 12/27/24  2:41 PM   Result Value Ref Range    Glucose 138 (H) 74 - 99 mg/dL    Sodium 141 136 - 145 mmol/L    Potassium 4.0 3.5 - 5.3 mmol/L    Chloride 110 (H) 98 - 107 mmol/L    Bicarbonate 20 (L) 21 - 32 mmol/L    Anion Gap 15 10 - 20  mmol/L    Urea Nitrogen 23 6 - 23 mg/dL    Creatinine 1.30 (H) 0.50 - 1.05 mg/dL    eGFR 43 (L) >60 mL/min/1.73m*2    Calcium 9.0 8.6 - 10.6 mg/dL   CBC    Collection Time: 12/27/24  2:41 PM   Result Value Ref Range    WBC 5.4 4.4 - 11.3 x10*3/uL    nRBC 0.0 0.0 - 0.0 /100 WBCs    RBC 3.59 (L) 4.00 - 5.20 x10*6/uL    Hemoglobin 10.8 (L) 12.0 - 16.0 g/dL    Hematocrit 34.0 (L) 36.0 - 46.0 %    MCV 95 80 - 100 fL    MCH 30.1 26.0 - 34.0 pg    MCHC 31.8 (L) 32.0 - 36.0 g/dL    RDW 14.2 11.5 - 14.5 %    Platelets 202 150 - 450 x10*3/uL   Type And Screen    Collection Time: 12/27/24  2:41 PM   Result Value Ref Range    ABO TYPE A     Rh TYPE POS     ANTIBODY SCREEN NEG       Echocardiogram  CONCLUSIONS:   1. The left ventricular systolic function is normal, with a visually estimated ejection fraction of 60-65%.   2. Spectral Doppler shows an abnormal pattern of left ventricular diastolic filling.   3. There is normal right ventricular global systolic function.

## 2024-12-27 NOTE — PREPROCEDURE INSTRUCTIONS
Fasting Guidelines    Why must I stop eating and drinking near surgery time?  With sedation, food or liquid in your stomach can enter your lungs causing serious complications  Increases nausea and vomiting    When do I need to stop eating and drinking before my surgery?  Do not eat any food or drink any liquids after midnight the night before your surgery/procedure.  You may have sips of water to take medications.    Additional Instructions:     We have sent a prescription for Hibiclens soap and Peridex mouth wash to your preferred pharmacy.  If you have not already, Please  your prescription and start using as directed before surgery.  Follow the instruction sheet provided to you at your CPM/PAT appointment.    Avoid herbal supplements, multivitamins and NSAIDS (non-steroidal anti-inflammatory drugs) such as Advil, Aleve, Ibuprofen, Naproxen, Excedrin, Meloxicam or Celebrex for at least 7 days prior to surgery. May take Tylenol as needed.    Avoid tobacco and alcohol products for 24 hours prior to surgery.    CONTACT SURGEON'S OFFICE IF YOU DEVELOP:  * Fever = 100.4 F   * New respiratory symptoms (e.g. cough, shortness of breath, respiratory distress, sore throat)  * Recent loss of taste or smell  *Flu like symptoms such as headache, fatigue or gastrointestinal symptoms  * You develop any open sores, shingles, burning or painful urination   AND/OR:  * You no longer wish to have the surgery.  * Any other personal circumstances change that may lead to the need to cancel or defer this surgery.  *You were admitted to any hospital within one week of your planned procedure.    Seven/Six Days before Surgery:  Review your medication instructions, stop indicated medications    Day of Surgery:  Review your medication instructions, take indicated medications  Wear comfortable loose fitting clothing  Do not use moisturizers, creams, lotions or perfume  All jewelry and valuables should be left at home      [facility]  [phone]           Patient Information: Pre-Operative Infection Prevention Measures     Why did I have my nose, under my arms, and groin swabbed?  The purpose of the swab is to identify Staphylococcus aureus inside your nose or on your skin.  The swab was sent to the laboratory for culture.  A positive swab/culture for Staphylococcus aureus is called colonization or carriage.      What is Staphylococcus aureus?  Staphylococcus aureus, also known as “staph”, is a germ found on the skin or in the nose of healthy people.  Sometimes Staphylococcus aureus can get into the body and cause an infection.  This can be minor (such as pimples, boils, or other skin problems).  It might also be serious (such as a blood infection, pneumonia, or a surgical site infection).    What is Staphylococcus aureus colonization or carriage?  Colonization or carriage means that a person has the germ but is not sick from it.  These bacteria can be spread on the hands or when breathing or sneezing.    How is Staphylococcus aureus spread?  It is most often spread by close contact with a person or item that carries it.    What happens if my culture is positive for Staphylococcus aureus?  Your doctor/medical team will use this information to guide any antibiotic treatment which may be necessary.  Regardless of the culture results, we will clean the inside of your nose with a betadine swab just before you have your surgery.      Will I get an infection if I have Staphylococcus aureus in my nose or on my skin?  Anyone can get an infection with Staphylococcus aureus.  However, the best way to reduce your risk of infection is to follow the instructions provided to you for the use of your CHG soap and dental rinse.        Patient Information: Oral/Dental Rinse    What is oral/dental rinse?   It is a mouthwash. It is a way of cleaning the mouth with a germ-killing solution before your surgery.  The solution contains  chlorhexidine, commonly known as CHG.   It is used inside the mouth to kill a bacteria known as Staphylococcus aureus.  Let your doctor know if you are allergic to Chlorhexidine.    Why do I need to use CHG oral/dental rinse?  The CHG oral/dental rinse helps to kill a bacteria in your mouth known as Staphylococcus aureus.     This reduces the risk of infection at the surgical site.      Using your CHG oral/dental rinse  STEPS:  Use your CHG oral/dental rinse after you brush your teeth the night before (at bedtime) and the morning of your surgery.  Follow all directions on your prescription label.    Use the cap on the container to measure 15ml   Swish (gargle if you can) the mouthwash in your mouth for at least 30 seconds, (do not swallow) and spit out  After you use your CHG rinse, do not rinse your mouth with water, drink or eat.  Please refer to the prescription label for the appropriate time to resume oral intake      What side effects might I have using the CHG oral/dental rinse?  CHG rinse will stick to plaque on the teeth.  Brush and floss just before use.  Teeth brushing will help avoid staining of plaque during use.      Patient Information: Home Preoperative Antibacterial Shower      What is a home preoperative antibacterial shower?  This shower is a way of cleaning the skin with a germ-killing solution before surgery.  The solution contains chlorhexidine, commonly known as CHG.  CHG is a skin cleanser with germ-killing ability.  Let your doctor know if you are allergic to chlorhexidine.    Why do I need to take a preoperative antibacterial shower?  Skin is not sterile.  It is best to try to make your skin as free of germs as possible before surgery.  Proper cleansing with a germ-killing soap before surgery can lower the number of germs on your skin.  This helps to reduce the risk of infection at the surgical site.  Following the instructions listed below will help you prepare your skin for surgery.       How do I use the solution?  Steps:  Begin using your CHG soap 5 days before your scheduled surgery on ________________________.    First, wash and rinse your hair using the CHG soap. Keep CHG soap away from ear canals and eyes.  Rinse completely, do not condition.  Hair extensions should be removed.  Wash your face with your normal soap and rinse.    Apply the CHG solution to a clean wet washcloth.  Turn the water off or move away from the water spray to avoid premature rinsing of the CHG soap as you are applying.   Firmly lather your entire body from the neck down.  Do not use on your face.  Pay special attention to the area(s) where your incision(s) will be located unless they are on your face.  Avoid scrubbing your skin too hard.  The important point is to have the CHG soap sit on your skin for 3 minutes.    When the 3 minutes are up, turn on the water and rinse the CHG solution off your body completely.   DO NOT wash with regular soap after you have used the CHG soap solution  Pat yourself dry with a clean, freshly-laundered towel.  DO NOT apply powders, deodorants, or lotions.  Dress in clean, freshly laundered nightclothes.    Be sure to sleep with clean, freshly laundered sheets.  Be aware that CHG will cause stains on fabrics; if you wash them with bleach after use.  Rinse your washcloth and other linens that have contact with CHG completely.  Use only non-chlorine detergents to launder the items used.   The morning of surgery is the fifth day.  Repeat the above steps and dress in clean comfortable clothing     Whom should I contact if I have any questions regarding the use of CHG soap?  Call the [facility], [facility] at [phone] if you have any questions.               Preoperative Brain Exercises    What are brain exercises?  A brain exercise is any activity that engages your thinking (cognitive) skills.    What types of activities are  considered brain exercises?  Jigsaw puzzles, crossword puzzles, word jumble, memory games, word search, and many more.  Many can be found free online or on your phone via a mobile onur.    Why should I do brain exercises before my surgery?  More recent research has shown brain exercise before surgery can lower the risk of postoperative delirium (confusion) which can be especially important for older adults.  Patients who did brain exercises for 5 to 10 hours the days before surgery, cut their risk of postoperative delirium in half up to 1 week after surgery.         [facility]    Preoperative Deep Breathing Exercises    Why it is important to do deep breathing exercises before my surgery?  Deep breathing exercises strengthen your breathing muscles.  This helps you to recover after your surgery and decreases the chance of breathing complications.      How are the deep breathing exercises done?  Sit straight with your back supported.  Breathe in deeply and slowly through your nose. Your lower rib cage should expand and your abdomen may move forward.  Hold that breath for 3 to 5 seconds.  Breathe out through pursed lips, slowly and completely.  Rest and repeat 10 times every hour while awake.  Rest longer if you become dizzy or lightheaded.         Patient and Family Education             Ways You Can Help Prevent Blood Clots             This handout explains some simple things you can do to help prevent blood clots.      Blood clots are blockages that can form in the body's veins. When a blood clot forms in your deep veins, it may be called a deep vein thrombosis, or DVT for short. Blood clots can happen in any part of the body where blood flows, but they are most common in the arms and legs. If a piece of a blood clot breaks free and travels to the lungs, it is called a pulmonary embolus (PE). A PE can be a very serious problem.         Being in the hospital or having surgery can raise your  chances of getting a blood clot because you may not be well enough to move around as much as you normally do.         Ways you can help prevent blood clots in the hospital         Wearing SCDs. SCDs stands for Sequential Compression Devices.   SCDs are special sleeves that wrap around your legs  They attach to a pump that fills them with air to gently squeeze your legs every few minutes.   This helps return the blood in your legs to your heart.   SCDs should only be taken off when walking or bathing.   SCDs may not be comfortable, but they can help save your life.               Wearing compression stockings - if your doctor orders them. These special snug fitting stockings gently squeeze your legs to help blood flow.       Walking. Walking helps move the blood in your legs.   If your doctor says it is ok, try walking the halls at least   5 times a day. Ask us to help you get up, so you don't fall.      Taking any blood thinning medicines your doctor orders.        Page 1 of 2     [facility]; 3/23   Ways you can help prevent blood clots at home       Wearing compression stockings - if your doctor orders them. ? Walking - to help move the blood in your legs.       Taking any blood thinning medicines your doctor orders.      Signs of a blood clot or PE      Tell your doctor or nurse know right away if you have of the problems listed below.    If you are at home, seek medical care right away. Call 911 for chest pain or problems breathing.               Signs of a blood clot (DVT) - such as pain,  swelling, redness or warmth in your arm or leg      Signs of a pulmonary embolism (PE) - such as chest     pain or feeling short of breath

## 2024-12-31 NOTE — PROGRESS NOTES
Radiation Oncology Treatment Summary    Patient Name:  Dina Mullins  MRN:  [id number]  :  1950    Referring Provider: Mabel Crenshaw MD   Primary Care Provider: Ken Saleh PA-C    Brief History: Please see the radiation oncology consultation note.  Briefly, Dina Mullins is a 74 y.o. female with Malignant neoplasm of overlapping sites of right female breast, Clinical: Stage IB (cT1, cN0(f), cM0, G3, ER-, WY-, HER2-)  Malignant neoplasm of overlapping sites of right female breast, Clinical: Stage IA (cT1, cN0(f), cM0, G1, ER+, WY+, HER2-)  Malignant neoplasm of overlapping sites of right female breast, Pathologic: Stage IIIA (pT2(2), pN1mi(sn), cM0, G3, ER-, WY-, HER2-).  The patient completed radiotherapy as outlined below.    The radiation planning and treatment procedures were explained to the patient in advance, and all questions were answered. The benefits and goals of treatment, options and alternatives, limitations, side effects and risks of radiation were also explained. The patient provided informed consent.    Technical Summary:  Region(s) Treated: Right breast, low axilla and right tumor bed  Radiation Dose Prescribed: 4256 cGy in 16 fractions to the right whole breast and low axilla followed by a sequential cone-down boost to the right tumor bed 1000 cGy in 4 fractions to a total dose of 5256 cGy in 20 fractions  Radiation Technique/Machine/Energy Used: 3DRT / Truebeam /15 MV photons  Additional radiation technical details available in our radiation oncology EMR [employer] and our treatment planning system.    Radiation Treatment Summary:    3D CRT: Right Breast with lymph nodes    Treatment Period Technique Fraction Dose Fractions Total Dose   Course 1 2024-2024  (days elapsed: 34)         R_Breast_Axilla 2024-2024 3D 266 / 266 cGy  4256 / 4,256 cGy         R_TB_boost 2024-2024 3D 250 / 250 cGy  1000 / 1,000 cGy       Please see the  patient's Mosaiq chart for further details regarding the radiation plan, including beam energy.    Concurrent Chemotherapy: None    Clinical Summary:  The patient tolerated this course of radiation therapy relatively well, with no unusual events or unanticipated toxicities. Symptoms during treatment included low energy improved with rest, grade 1 radiation dermatitis treated with Udderly smooth, mild difficulty swallowing and mild breast twinges.    CTCAE Toxicity Overview:   Toxicity Assessment          11/27/2024    14:30 12/5/2024    14:27 12/13/2024    14:51 12/20/2024    14:43   Toxicity Assessment   Treatment Site Breast Breast Breast Breast   Anorexia Grade 0 Grade 1 Grade 1 Grade 0   Anxiety Grade 1 Grade 0 Grade 0 Grade 0   Dehydration Grade 1 Grade 1 Grade 1 Grade 0   Depression Grade 1 Grade 1 Grade 1 Grade 0   Dermatitis Radiation Grade 0 Grade 0 Grade 1 Grade 1   Diarrhea Grade 0 Grade 0 Grade 0 Grade 0   Fatigue Grade 1 Grade 1 Grade 1 Grade 1   Fibrosis Deep Connective Tissue Grade 0 Grade 0 Grade 0 Grade 0   Fracture Grade 0 Grade 0 Grade 0 Grade 0   Nausea Grade 0 Grade 0 Grade 0 Grade 1   Pain Grade 0 Grade 0 Grade 0 Grade 0   Treatment Related Secondary Malignancy Grade 0 Grade 0 Grade 0 Grade 0   Tumor Pain Grade 0 Grade 0 Grade 0 Grade 0   Vomiting Grade 0 Grade 0 Grade 0 Grade 0   Abdominal Pain Grade 0  Grade 0 Grade 0   Dysphagia Grade 0  Grade 0 Grade 1   Esophagitis Grade 0  Grade 0 Grade 0   Gastric Hemorrhage Grade 0  Grade 0 Grade 0   Mucositis Oral Grade 0  Grade 0 Grade 0   Dry Mouth Grade 0  Grade 0 Grade 0   Breast Infection Grade 0 Grade 0 Grade 0 Grade 0   Seroma Grade 0 Grade 0 Grade 0 Grade 0   Joint Range of Motion Decreased Grade 0 Grade 0 Grade 0 Grade 0   Joint Range of Motion Decreased Lumbar Spine Grade 0 Grade 0 Grade 0 Grade 0   Brachial Plexopathy Grade 0 Grade 0 Grade 0 Grade 0   Breast Atrophy Grade 0 Grade 0 Grade 0 Grade 0   Breast Pain Grade 1       twinges Grade 1        twingegs Grade 1 Grade 1       twinges   Nipple Deformity Grade 0 Grade 0 Grade 0 Grade 0   Pneumonitis Grade 0 Grade 0 Grade 0 Grade 0   Edema Limbs Grade 0 Grade 0 Grade 0 Grade 0   Lymphedema Grade 0 Grade 0 Grade 0 Grade 0   Thromboembolic Event Grade 0 Grade 0 Grade 0 Grade 0   Hot Flashes Grade 0 Grade 0       Patient Disposition:   The patient will be scheduled for follow-up at our clinic on 2/3/25 @ 1130. The patient was encouraged to contact us for any questions or concerns in the interim.    Bib Buitrago MD  [facility]  [facility]  - Department of Radiation Oncology  Phone: [phone]  Fax: [phone]  [facility] secure chat preferred

## 2025-01-02 ENCOUNTER — HOSPITAL ENCOUNTER (OUTPATIENT)
Dept: CARDIOLOGY | Facility: CLINIC | Age: 75
Discharge: HOME | End: 2025-01-02
Payer: MEDICARE

## 2025-01-02 DIAGNOSIS — R01.1 MURMUR, HEART: ICD-10-CM

## 2025-01-02 PROCEDURE — 93306 TTE W/DOPPLER COMPLETE: CPT

## 2025-01-06 LAB
AORTIC VALVE MEAN GRADIENT: 5 MMHG
AORTIC VALVE PEAK VELOCITY: 1.53 M/S
AV PEAK GRADIENT: 9 MMHG
AVA (PEAK VEL): 2.58 CM2
AVA (VTI): 2.69 CM2
EJECTION FRACTION APICAL 4 CHAMBER: 53.1
EJECTION FRACTION: 63 %
LEFT ATRIUM VOLUME AREA LENGTH INDEX BSA: 40.3 ML/M2
LEFT VENTRICULAR OUTFLOW TRACT DIAMETER: 2.01 CM
MITRAL VALVE E/A RATIO: 0.82
RIGHT VENTRICLE FREE WALL PEAK S': 12 CM/S
RIGHT VENTRICLE PEAK SYSTOLIC PRESSURE: 25.8 MMHG
TRICUSPID ANNULAR PLANE SYSTOLIC EXCURSION: 2 CM

## 2025-01-06 NOTE — PROGRESS NOTES
Subjective   Reason for Visit: Dina Mullins is an 74 y.o. female here for a follow up.    HPI  Patient Care Team:  Ken Saleh PA-C as PCP - General (Internal Medicine)  CONSUELO Roach as PCP - [employer] PCP  Maine Birch MD as Consulting Physician (Hematology and Oncology)  Sherron Taylor RN as Registered Nurse (Hematology and Oncology)  Mei Black MD as Consulting Physician (Obstetrics and Gynecology)     Had a hysterectomy on 1/8/25.    Left arm pain:  Seeing ortho.  I gave Robaxin previously.  Was found to have moderate OA in her arm.  No acute injury.  Difficulty with raising her arm.  No elbow/wrist/neck pains.  Mostly in the shoulder area.    Breast cancer/Endometrial cancer:  Had hysterectomy in Jan 2025.  Was found to be invasive ductal carcinoma in July 2024.  Following with surgical oncology.  Screening and diagnostic mammograms were abnormal in May 2024.    HTN/A-fib:  Taking Lasix, Lisinopril, Xarelto.  Seeing cardiology.  BP today is   Denies headaches.    Diabetes:  Taking Lantus 25 units daily, Rybelsus.  Last A1C was 5.8 in Aug 2024.  POC today is     Dizziness/Tinnitus:  I referred to ENT and vestibular therapy previously.  She had recently been started on Dopamine by neurology when this started.  Taking Meclizine which helps.  She did have a normal brain MRI.  She does see neurology.  Does not follow with ENT.  Causing everyday struggles right now.    HLD:  Taking Atorvastatin.  Last checked Sep 2024.    Anxiety/Depression:  Taking Cymbalta.  Denies SI/HI.    Parkinson's:  Taking Dopamine now.  Seeing neurology.    Stage III CKD:  Creatinine is stable in Sep 2024.    Health maintenance:  Smoking: Never a smoker.  Mammogram (40-75): Abnormal screening/diagnostic mammo in April 2024. Following with surgical oncology.  Labs: Sep 2024.  Colonoscopy (50-75): 2014  DEXA (65+, q 2 years): Dec 2021.  Prevnar 13 (65+):  Pneumovax 23 (65+, 1 year after Prev  13):  Influenza:  Zostavax (60+, 1 time, at pharmacy):    Review of Systems  12 point review of systems negative unless stated above in HPI    Objective   Vitals:  There were no vitals taken for this visit.      Physical Exam  General: Alert and oriented, well nourished, no acute distress.  Lungs: Clear to auscultation, non-labored respiration.  Heart: Normal rate, regular rhythm, no murmur, gallop or edema.  Neurologic: Awake, alert, and oriented X3, CN II-XII intact.  Psychiatric: Cooperative, appropriate mood and affect.    Assessment/Plan     Problem List Items Addressed This Visit          Cardiac and Vasculature    Benign essential HTN    Hyperlipidemia    Paroxysmal atrial fibrillation (Multi)    Atrial fibrillation (Multi)       Endocrine/Metabolic    Type 2 diabetes mellitus with obesity (Multi) - Primary    Relevant Orders    POCT glycosylated hemoglobin (Hb A1C) manually resulted    Obesity, morbid (Multi)       Genitourinary and Reproductive    Stage 3b chronic kidney disease (Multi)       Hematology and Neoplasia    Malignant neoplasm of overlapping sites of right female breast, unspecified estrogen receptor status       Mental Health    Mixed anxiety and depressive disorder       Neuro    Parkinsonism (Multi)

## 2025-01-06 NOTE — HOSPITAL COURSE
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 Please refer to relevant operative note for further details. She was admitted overnight for continued observation. By POD #1, she was meeting all expected postoperative milestones (tolerating a diet, ambulating, pain well controlled, and voiding). She was discharged home on 1/9 in stable condition with schedule follow-up with Sofia Le on 2/06/25.             [ x] consent  [ x] team  [ x] orders    75 yo with grade 2 Endometrial adenocarcinoma and breast CA, here for robot assisted TLH, BSO. Currently undergoing RT for breast CA.    Lab Results   Component Value Date    WBC 5.4 12/27/2024    HGB 10.8 (L) 12/27/2024    HCT 34.0 (L) 12/27/2024    MCV 95 12/27/2024     12/27/2024     Lab Results   Component Value Date    GLUCOSE 138 (H) 12/27/2024    CALCIUM 9.0 12/27/2024     12/27/2024    K 4.0 12/27/2024    CO2 20 (L) 12/27/2024     (H) 12/27/2024    BUN 23 12/27/2024    CREATININE 1.30 (H) 12/27/2024 9/30/24 - PET/CT  IMPRESSION:  1. FDG avid right breast lesion and right axillary node, consistent  with known breast cancer with chantale metastasis.  2. Intense FDG uptake in endometrial cavity, consistent with the  known endometrial cancer.  3. No PET evidence of distant metastasis.      Pathology:  Endometrial biopsy - 9/26/24  A.  ENDOMETRIUM, BIOPSY:              Endometrial adenocarcinoma, grade 2, see comment.  Comment: The results of p53 and MMR immunostains will be reported in addenda.  B.  CERVIX, BIOPSY:              Fragments of inflamed endocervical mucosa, clinically polyp.    Past Medical History:   Diagnosis Date    Anemia     Anxiety     Arrhythmia     PAfib    Arthritis     Atrial fib/flutter, transient (Multi)     Bilateral tinnitus     Breast cancer (Multi)     Cataract     Chronic kidney failure     stage three    CKD (chronic kidney  disease)     3b    Depression     DM (diabetes mellitus) (Multi)     Endometrial cancer (Multi)     Hyperlipidemia     Hypertension     Left arm pain     Obesity     Parkinson disease (Multi)     Post-menopausal bleeding     Sepsis (Multi)     Septic shock (Multi) 2019    Type 2 diabetes mellitus     Uterine cancer (Multi)     Vertigo     Vitamin D deficiency    Diverticulitis, PUD    Past Surgical History:   Procedure Laterality Date    BIOPSY      uterine    BREAST BIOPSY Right 2024    IDC    BREAST LUMPECTOMY Right 2024    BREAST LUMPECTOMY Right     LYMPH NODE DISSECTION Right 2024   - C/section x1 +tubal ligation   - R groin soft tissue debridement in s/o soft tissue necrotizing infection     OBGHx: ; FTSVD x2 with C/section x1  - Menarche age 11; menopausal age 64 with PMB  - Last Pap >15 years - denies prior abnormal    Family History   Problem Relation Name Age of Onset    Cancer Mother      Breast cancer Mother  69    Alzheimer's disease Father       Social History     Tobacco Use    Smoking status: Never     Passive exposure: Never    Smokeless tobacco: Never   Vaping Use    Vaping status: Never Used   Substance Use Topics    Alcohol use: Never    Drug use: Never     Meds: Tylenol, Augmentin, Sinemet, Cymbalta, Flonase, Lasix, Lantus 25u qAM, lisinopril, meclizine, Megace, Macrobid, Xarelto    Allergies   Allergen Reactions    Codeine Nausea/vomiting     Objective   There were no vitals filed for this visit.  General: Alert and oriented, in NAD  Neuro: Awake, alert, conversational  CV: Regular rate  Respiratory: Even and unlabored on RA  Extremities: Full ROM  Psych: appropriate mood and affect

## 2025-01-07 ENCOUNTER — TELEPHONE (OUTPATIENT)
Dept: CARDIOLOGY | Facility: CLINIC | Age: 75
End: 2025-01-07
Payer: MEDICARE

## 2025-01-07 NOTE — PROGRESS NOTES
Pharmacy Medication History Review    Dina Mullins is a 74 y.o. female who is planned to be admitted for No Principal Problem: There is no principal problem currently on the Problem List. Please update the Problem List and refresh.. Pharmacy called the patient prior to their scheduled procedure and reviewed the patient's fuipt-un-huuchsdlf medications for accuracy.    Medications ADDED:  none  Medications CHANGED:  Tylenol 325mg #[id number] to tylenol 650mg #2BID  Atorvastatin 40mg directions from #1QD to #QIW  Furosemide 40mg from #1QD to #1PRN  Meclizine 25mg directions from #1TID to #1QAM+PRN  Medications REMOVED:   Nitrofurantoin 100mg    Please review updated prior to admission medication list and comments regarding how patient may be taking medications differently by going to Admission tab --> Admission Orders --> Admit Orders / Review prior to admission medications.     Preferred pharmacy, last doses of medications, and allergies to be confirmed with patient by nursing the day of procedure.     Sources used to complete the med history include:  [facility]  Pharmacy dispense history  Patient interview  Chart Review  Care Everywhere     Below are additional concerns with the patient's PTA list.  Patient states they are taking #2 tablets of acetaminophen 650mg twice daily  Patient states they are taking #1 tablet of atorvastatin 40mg 4 times a week. L.F. 05/13/24 #90/90d. There is no recent fill history to verify change  Patient states they are taking furosemide 40mg PRN only, not daily. L.F. 05/13/24 #90/90d  Patient states they are taking #1 tablet of meclizine 25mg every morning plus additional tablets as needed  Patient states they are taking #1 tablet of megestrol 40mg twice daily. L.F. 10/03/24 #60/30d. No recent fill history to verify  Patient states they finished macrobid 100mg therapy    Mei Padilla [facility]  Please reach out via Secure Chat for questions or call  [phone] or Vocera MedRec

## 2025-01-08 ENCOUNTER — SURGERY (OUTPATIENT)
Age: 75
End: 2025-01-08
Payer: MEDICARE

## 2025-01-08 ENCOUNTER — ANESTHESIA (OUTPATIENT)
Dept: OPERATING ROOM | Facility: HOSPITAL | Age: 75
End: 2025-01-08
Payer: MEDICARE

## 2025-01-08 ENCOUNTER — ANESTHESIA EVENT (OUTPATIENT)
Dept: OPERATING ROOM | Facility: HOSPITAL | Age: 75
End: 2025-01-08
Payer: MEDICARE

## 2025-01-08 ENCOUNTER — HOSPITAL ENCOUNTER (INPATIENT)
Facility: HOSPITAL | Age: 75
LOS: 3 days | Discharge: HOME | End: 2025-01-11
Attending: STUDENT IN AN ORGANIZED HEALTH CARE EDUCATION/TRAINING PROGRAM | Admitting: STUDENT IN AN ORGANIZED HEALTH CARE EDUCATION/TRAINING PROGRAM
Payer: MEDICARE

## 2025-01-08 DIAGNOSIS — E11.69 TYPE 2 DIABETES MELLITUS WITH OBESITY (MULTI): ICD-10-CM

## 2025-01-08 DIAGNOSIS — G89.18 POSTOPERATIVE PAIN: ICD-10-CM

## 2025-01-08 DIAGNOSIS — Z90.710 STATUS POST HYSTERECTOMY: ICD-10-CM

## 2025-01-08 DIAGNOSIS — E66.9 TYPE 2 DIABETES MELLITUS WITH OBESITY (MULTI): ICD-10-CM

## 2025-01-08 DIAGNOSIS — C54.1 ENDOMETRIAL CANCER (MULTI): ICD-10-CM

## 2025-01-08 DIAGNOSIS — N95.0 POST-MENOPAUSAL BLEEDING: Primary | ICD-10-CM

## 2025-01-08 LAB
GLUCOSE BLD MANUAL STRIP-MCNC: 114 MG/DL (ref 74–99)
GLUCOSE BLD MANUAL STRIP-MCNC: 95 MG/DL (ref 74–99)

## 2025-01-08 PROCEDURE — 3700000001 HC GENERAL ANESTHESIA TIME - INITIAL BASE CHARGE: Performed by: STUDENT IN AN ORGANIZED HEALTH CARE EDUCATION/TRAINING PROGRAM

## 2025-01-08 PROCEDURE — 88112 CYTOPATH CELL ENHANCE TECH: CPT | Performed by: PATHOLOGY

## 2025-01-08 PROCEDURE — 0UT74ZZ RESECTION OF BILATERAL FALLOPIAN TUBES, PERCUTANEOUS ENDOSCOPIC APPROACH: ICD-10-PCS | Performed by: STUDENT IN AN ORGANIZED HEALTH CARE EDUCATION/TRAINING PROGRAM

## 2025-01-08 PROCEDURE — 96372 THER/PROPH/DIAG INJ SC/IM: CPT

## 2025-01-08 PROCEDURE — 2780000003 HC OR 278 NO HCPCS: Performed by: STUDENT IN AN ORGANIZED HEALTH CARE EDUCATION/TRAINING PROGRAM

## 2025-01-08 PROCEDURE — 2500000004 HC RX 250 GENERAL PHARMACY W/ HCPCS (ALT 636 FOR OP/ED)

## 2025-01-08 PROCEDURE — 88112 CYTOPATH CELL ENHANCE TECH: CPT | Mod: TC,MCY | Performed by: STUDENT IN AN ORGANIZED HEALTH CARE EDUCATION/TRAINING PROGRAM

## 2025-01-08 PROCEDURE — 0UT24ZZ RESECTION OF BILATERAL OVARIES, PERCUTANEOUS ENDOSCOPIC APPROACH: ICD-10-PCS | Performed by: STUDENT IN AN ORGANIZED HEALTH CARE EDUCATION/TRAINING PROGRAM

## 2025-01-08 PROCEDURE — 7100000011 HC EXTENDED STAY RECOVERY HOURLY - NURSING UNIT

## 2025-01-08 PROCEDURE — 88307 TISSUE EXAM BY PATHOLOGIST: CPT | Performed by: STUDENT IN AN ORGANIZED HEALTH CARE EDUCATION/TRAINING PROGRAM

## 2025-01-08 PROCEDURE — 7100000002 HC RECOVERY ROOM TIME - EACH INCREMENTAL 1 MINUTE: Performed by: STUDENT IN AN ORGANIZED HEALTH CARE EDUCATION/TRAINING PROGRAM

## 2025-01-08 PROCEDURE — 0UT94ZZ RESECTION OF UTERUS, PERCUTANEOUS ENDOSCOPIC APPROACH: ICD-10-PCS | Performed by: STUDENT IN AN ORGANIZED HEALTH CARE EDUCATION/TRAINING PROGRAM

## 2025-01-08 PROCEDURE — 2500000001 HC RX 250 WO HCPCS SELF ADMINISTERED DRUGS (ALT 637 FOR MEDICARE OP): Performed by: STUDENT IN AN ORGANIZED HEALTH CARE EDUCATION/TRAINING PROGRAM

## 2025-01-08 PROCEDURE — 3700000002 HC GENERAL ANESTHESIA TIME - EACH INCREMENTAL 1 MINUTE: Performed by: STUDENT IN AN ORGANIZED HEALTH CARE EDUCATION/TRAINING PROGRAM

## 2025-01-08 PROCEDURE — 7100000001 HC RECOVERY ROOM TIME - INITIAL BASE CHARGE: Performed by: STUDENT IN AN ORGANIZED HEALTH CARE EDUCATION/TRAINING PROGRAM

## 2025-01-08 PROCEDURE — 8E0W4CZ ROBOTIC ASSISTED PROCEDURE OF TRUNK REGION, PERCUTANEOUS ENDOSCOPIC APPROACH: ICD-10-PCS | Performed by: STUDENT IN AN ORGANIZED HEALTH CARE EDUCATION/TRAINING PROGRAM

## 2025-01-08 PROCEDURE — 82947 ASSAY GLUCOSE BLOOD QUANT: CPT

## 2025-01-08 PROCEDURE — A58571 PR LAPAROSCOPY W TOT HYSTERECTUTERUS <=250 GRAM  W TUBE/OVARY

## 2025-01-08 PROCEDURE — 1170000001 HC PRIVATE ONCOLOGY ROOM DAILY

## 2025-01-08 PROCEDURE — 2720000007 HC OR 272 NO HCPCS: Performed by: STUDENT IN AN ORGANIZED HEALTH CARE EDUCATION/TRAINING PROGRAM

## 2025-01-08 PROCEDURE — 2500000004 HC RX 250 GENERAL PHARMACY W/ HCPCS (ALT 636 FOR OP/ED): Mod: JG,TB | Performed by: STUDENT IN AN ORGANIZED HEALTH CARE EDUCATION/TRAINING PROGRAM

## 2025-01-08 PROCEDURE — 88307 TISSUE EXAM BY PATHOLOGIST: CPT | Mod: TC,SUR | Performed by: STUDENT IN AN ORGANIZED HEALTH CARE EDUCATION/TRAINING PROGRAM

## 2025-01-08 PROCEDURE — 07B64ZX EXCISION OF LEFT AXILLARY LYMPHATIC, PERCUTANEOUS ENDOSCOPIC APPROACH, DIAGNOSTIC: ICD-10-PCS | Performed by: STUDENT IN AN ORGANIZED HEALTH CARE EDUCATION/TRAINING PROGRAM

## 2025-01-08 PROCEDURE — 2500000005 HC RX 250 GENERAL PHARMACY W/O HCPCS: Performed by: STUDENT IN AN ORGANIZED HEALTH CARE EDUCATION/TRAINING PROGRAM

## 2025-01-08 PROCEDURE — 3600000017 HC OR TIME - EACH INCREMENTAL 1 MINUTE - PROCEDURE LEVEL SIX: Performed by: STUDENT IN AN ORGANIZED HEALTH CARE EDUCATION/TRAINING PROGRAM

## 2025-01-08 PROCEDURE — 07B54ZX EXCISION OF RIGHT AXILLARY LYMPHATIC, PERCUTANEOUS ENDOSCOPIC APPROACH, DIAGNOSTIC: ICD-10-PCS | Performed by: STUDENT IN AN ORGANIZED HEALTH CARE EDUCATION/TRAINING PROGRAM

## 2025-01-08 PROCEDURE — A58571 PR LAPAROSCOPY W TOT HYSTERECTUTERUS <=250 GRAM  W TUBE/OVARY: Performed by: STUDENT IN AN ORGANIZED HEALTH CARE EDUCATION/TRAINING PROGRAM

## 2025-01-08 PROCEDURE — 88309 TISSUE EXAM BY PATHOLOGIST: CPT | Performed by: STUDENT IN AN ORGANIZED HEALTH CARE EDUCATION/TRAINING PROGRAM

## 2025-01-08 PROCEDURE — 99100 ANES PT EXTEME AGE<1 YR&>70: CPT | Performed by: STUDENT IN AN ORGANIZED HEALTH CARE EDUCATION/TRAINING PROGRAM

## 2025-01-08 PROCEDURE — 2500000004 HC RX 250 GENERAL PHARMACY W/ HCPCS (ALT 636 FOR OP/ED): Performed by: STUDENT IN AN ORGANIZED HEALTH CARE EDUCATION/TRAINING PROGRAM

## 2025-01-08 PROCEDURE — 3600000018 HC OR TIME - INITIAL BASE CHARGE - PROCEDURE LEVEL SIX: Performed by: STUDENT IN AN ORGANIZED HEALTH CARE EDUCATION/TRAINING PROGRAM

## 2025-01-08 RX ORDER — DROPERIDOL 2.5 MG/ML
0.62 INJECTION, SOLUTION INTRAMUSCULAR; INTRAVENOUS ONCE AS NEEDED
Status: DISCONTINUED | OUTPATIENT
Start: 2025-01-08 | End: 2025-01-08 | Stop reason: HOSPADM

## 2025-01-08 RX ORDER — MIDAZOLAM HYDROCHLORIDE 1 MG/ML
INJECTION INTRAMUSCULAR; INTRAVENOUS AS NEEDED
Status: DISCONTINUED | OUTPATIENT
Start: 2025-01-08 | End: 2025-01-08

## 2025-01-08 RX ORDER — ONDANSETRON HYDROCHLORIDE 2 MG/ML
4 INJECTION, SOLUTION INTRAVENOUS ONCE AS NEEDED
Status: DISCONTINUED | OUTPATIENT
Start: 2025-01-08 | End: 2025-01-08 | Stop reason: HOSPADM

## 2025-01-08 RX ORDER — LABETALOL HYDROCHLORIDE 5 MG/ML
5 INJECTION, SOLUTION INTRAVENOUS ONCE AS NEEDED
Status: DISCONTINUED | OUTPATIENT
Start: 2025-01-08 | End: 2025-01-08 | Stop reason: HOSPADM

## 2025-01-08 RX ORDER — FENTANYL CITRATE 50 UG/ML
INJECTION, SOLUTION INTRAMUSCULAR; INTRAVENOUS AS NEEDED
Status: DISCONTINUED | OUTPATIENT
Start: 2025-01-08 | End: 2025-01-08

## 2025-01-08 RX ORDER — ACETAMINOPHEN 325 MG/1
975 TABLET ORAL EVERY 6 HOURS
Status: DISCONTINUED | OUTPATIENT
Start: 2025-01-08 | End: 2025-01-11 | Stop reason: HOSPADM

## 2025-01-08 RX ORDER — ROCURONIUM BROMIDE 10 MG/ML
INJECTION, SOLUTION INTRAVENOUS AS NEEDED
Status: DISCONTINUED | OUTPATIENT
Start: 2025-01-08 | End: 2025-01-08

## 2025-01-08 RX ORDER — FENTANYL CITRATE 50 UG/ML
50 INJECTION, SOLUTION INTRAMUSCULAR; INTRAVENOUS EVERY 5 MIN PRN
Status: DISCONTINUED | OUTPATIENT
Start: 2025-01-08 | End: 2025-01-08 | Stop reason: HOSPADM

## 2025-01-08 RX ORDER — ALBUTEROL SULFATE 0.83 MG/ML
2.5 SOLUTION RESPIRATORY (INHALATION) ONCE AS NEEDED
Status: DISCONTINUED | OUTPATIENT
Start: 2025-01-08 | End: 2025-01-08 | Stop reason: HOSPADM

## 2025-01-08 RX ORDER — OXYCODONE HYDROCHLORIDE 5 MG/1
5 TABLET ORAL EVERY 4 HOURS PRN
Status: DISCONTINUED | OUTPATIENT
Start: 2025-01-08 | End: 2025-01-08 | Stop reason: HOSPADM

## 2025-01-08 RX ORDER — ONDANSETRON HYDROCHLORIDE 2 MG/ML
4 INJECTION, SOLUTION INTRAVENOUS EVERY 6 HOURS PRN
Status: DISCONTINUED | OUTPATIENT
Start: 2025-01-08 | End: 2025-01-11 | Stop reason: HOSPADM

## 2025-01-08 RX ORDER — SIMETHICONE 80 MG
80 TABLET,CHEWABLE ORAL 4 TIMES DAILY PRN
Status: DISCONTINUED | OUTPATIENT
Start: 2025-01-08 | End: 2025-01-11 | Stop reason: HOSPADM

## 2025-01-08 RX ORDER — PROPOFOL 10 MG/ML
INJECTION, EMULSION INTRAVENOUS AS NEEDED
Status: DISCONTINUED | OUTPATIENT
Start: 2025-01-08 | End: 2025-01-08

## 2025-01-08 RX ORDER — NALOXONE HYDROCHLORIDE 0.4 MG/ML
0.1 INJECTION, SOLUTION INTRAMUSCULAR; INTRAVENOUS; SUBCUTANEOUS EVERY 5 MIN PRN
Status: DISCONTINUED | OUTPATIENT
Start: 2025-01-08 | End: 2025-01-11 | Stop reason: HOSPADM

## 2025-01-08 RX ORDER — TRAMADOL HYDROCHLORIDE 50 MG/1
100 TABLET ORAL EVERY 6 HOURS PRN
Status: DISCONTINUED | OUTPATIENT
Start: 2025-01-08 | End: 2025-01-11 | Stop reason: HOSPADM

## 2025-01-08 RX ORDER — POLYETHYLENE GLYCOL 3350 17 G/17G
17 POWDER, FOR SOLUTION ORAL DAILY
Status: DISCONTINUED | OUTPATIENT
Start: 2025-01-08 | End: 2025-01-09

## 2025-01-08 RX ORDER — CARBIDOPA AND LEVODOPA 25; 100 MG/1; MG/1
1 TABLET ORAL 3 TIMES DAILY
Status: DISCONTINUED | OUTPATIENT
Start: 2025-01-08 | End: 2025-01-11 | Stop reason: HOSPADM

## 2025-01-08 RX ORDER — ATORVASTATIN CALCIUM 40 MG/1
40 TABLET, FILM COATED ORAL NIGHTLY
Status: DISCONTINUED | OUTPATIENT
Start: 2025-01-08 | End: 2025-01-11 | Stop reason: HOSPADM

## 2025-01-08 RX ORDER — ONDANSETRON 4 MG/1
4 TABLET, ORALLY DISINTEGRATING ORAL EVERY 6 HOURS PRN
Status: DISCONTINUED | OUTPATIENT
Start: 2025-01-08 | End: 2025-01-11 | Stop reason: HOSPADM

## 2025-01-08 RX ORDER — SODIUM CHLORIDE 0.9 G/100ML
IRRIGANT IRRIGATION AS NEEDED
Status: DISCONTINUED | OUTPATIENT
Start: 2025-01-08 | End: 2025-01-08 | Stop reason: HOSPADM

## 2025-01-08 RX ORDER — HYDRALAZINE HYDROCHLORIDE 20 MG/ML
5 INJECTION INTRAMUSCULAR; INTRAVENOUS EVERY 30 MIN PRN
Status: DISCONTINUED | OUTPATIENT
Start: 2025-01-08 | End: 2025-01-08 | Stop reason: HOSPADM

## 2025-01-08 RX ORDER — LISINOPRIL 10 MG/1
10 TABLET ORAL DAILY
Status: DISCONTINUED | OUTPATIENT
Start: 2025-01-09 | End: 2025-01-11 | Stop reason: HOSPADM

## 2025-01-08 RX ORDER — INSULIN GLARGINE 100 [IU]/ML
25 INJECTION, SOLUTION SUBCUTANEOUS EVERY 24 HOURS
Status: DISCONTINUED | OUTPATIENT
Start: 2025-01-09 | End: 2025-01-10

## 2025-01-08 RX ORDER — TRAMADOL HYDROCHLORIDE 50 MG/1
50 TABLET ORAL EVERY 6 HOURS PRN
Status: DISCONTINUED | OUTPATIENT
Start: 2025-01-08 | End: 2025-01-11 | Stop reason: HOSPADM

## 2025-01-08 RX ORDER — INDOCYANINE GREEN AND WATER 25 MG
KIT INJECTION AS NEEDED
Status: DISCONTINUED | OUTPATIENT
Start: 2025-01-08 | End: 2025-01-08 | Stop reason: HOSPADM

## 2025-01-08 RX ORDER — CEFAZOLIN 1 G/1
INJECTION, POWDER, FOR SOLUTION INTRAVENOUS AS NEEDED
Status: DISCONTINUED | OUTPATIENT
Start: 2025-01-08 | End: 2025-01-08

## 2025-01-08 RX ORDER — LIDOCAINE HYDROCHLORIDE 10 MG/ML
0.1 INJECTION, SOLUTION EPIDURAL; INFILTRATION; INTRACAUDAL; PERINEURAL ONCE
Status: DISCONTINUED | OUTPATIENT
Start: 2025-01-08 | End: 2025-01-08 | Stop reason: HOSPADM

## 2025-01-08 RX ORDER — METHOCARBAMOL 100 MG/ML
INJECTION, SOLUTION INTRAMUSCULAR; INTRAVENOUS AS NEEDED
Status: DISCONTINUED | OUTPATIENT
Start: 2025-01-08 | End: 2025-01-08

## 2025-01-08 RX ORDER — HYDROMORPHONE HYDROCHLORIDE 1 MG/ML
INJECTION, SOLUTION INTRAMUSCULAR; INTRAVENOUS; SUBCUTANEOUS AS NEEDED
Status: DISCONTINUED | OUTPATIENT
Start: 2025-01-08 | End: 2025-01-08

## 2025-01-08 RX ORDER — DULOXETIN HYDROCHLORIDE 20 MG/1
20 CAPSULE, DELAYED RELEASE ORAL DAILY
Status: DISCONTINUED | OUTPATIENT
Start: 2025-01-08 | End: 2025-01-11 | Stop reason: HOSPADM

## 2025-01-08 RX ORDER — ONDANSETRON HYDROCHLORIDE 2 MG/ML
INJECTION, SOLUTION INTRAVENOUS AS NEEDED
Status: DISCONTINUED | OUTPATIENT
Start: 2025-01-08 | End: 2025-01-08

## 2025-01-08 RX ORDER — HEPARIN SODIUM 5000 [USP'U]/ML
7500 INJECTION, SOLUTION INTRAVENOUS; SUBCUTANEOUS EVERY 8 HOURS SCHEDULED
Status: DISCONTINUED | OUTPATIENT
Start: 2025-01-08 | End: 2025-01-11 | Stop reason: HOSPADM

## 2025-01-08 RX ORDER — INSULIN LISPRO 100 [IU]/ML
0-20 INJECTION, SOLUTION INTRAVENOUS; SUBCUTANEOUS
Status: DISCONTINUED | OUTPATIENT
Start: 2025-01-09 | End: 2025-01-11 | Stop reason: HOSPADM

## 2025-01-08 RX ADMIN — PROPOFOL 40 MG: 10 INJECTION, EMULSION INTRAVENOUS at 14:37

## 2025-01-08 RX ADMIN — SUGAMMADEX 200 MG: 100 INJECTION, SOLUTION INTRAVENOUS at 15:40

## 2025-01-08 RX ADMIN — ROCURONIUM BROMIDE 100 MG: 10 INJECTION INTRAVENOUS at 13:00

## 2025-01-08 RX ADMIN — SODIUM CHLORIDE, SODIUM LACTATE, POTASSIUM CHLORIDE, AND CALCIUM CHLORIDE: 600; 310; 30; 20 INJECTION, SOLUTION INTRAVENOUS at 12:54

## 2025-01-08 RX ADMIN — DEXAMETHASONE SODIUM PHOSPHATE 8 MG: 4 INJECTION INTRA-ARTICULAR; INTRALESIONAL; INTRAMUSCULAR; INTRAVENOUS; SOFT TISSUE at 13:14

## 2025-01-08 RX ADMIN — SODIUM CHLORIDE 1000 ML: 900 IRRIGANT IRRIGATION at 13:41

## 2025-01-08 RX ADMIN — FENTANYL CITRATE 50 MCG: 50 INJECTION, SOLUTION INTRAMUSCULAR; INTRAVENOUS at 13:00

## 2025-01-08 RX ADMIN — METHOCARBAMOL 1000 MG: 100 INJECTION INTRAMUSCULAR; INTRAVENOUS at 15:20

## 2025-01-08 RX ADMIN — SUGAMMADEX 200 MG: 100 INJECTION, SOLUTION INTRAVENOUS at 15:59

## 2025-01-08 RX ADMIN — Medication 1 APPLICATION: at 13:00

## 2025-01-08 RX ADMIN — PROPOFOL 160 MG: 10 INJECTION, EMULSION INTRAVENOUS at 13:00

## 2025-01-08 RX ADMIN — INDOCYANINE GREEN AND WATER 25 MG: KIT at 15:10

## 2025-01-08 RX ADMIN — ROCURONIUM BROMIDE 20 MG: 10 INJECTION INTRAVENOUS at 14:20

## 2025-01-08 RX ADMIN — ATORVASTATIN CALCIUM 40 MG: 40 TABLET, FILM COATED ORAL at 21:00

## 2025-01-08 RX ADMIN — FENTANYL CITRATE 50 MCG: 50 INJECTION, SOLUTION INTRAMUSCULAR; INTRAVENOUS at 13:14

## 2025-01-08 RX ADMIN — MIDAZOLAM HYDROCHLORIDE 2 MG: 1 INJECTION, SOLUTION INTRAMUSCULAR; INTRAVENOUS at 13:00

## 2025-01-08 RX ADMIN — HYDROMORPHONE HYDROCHLORIDE 0.5 MG: 1 INJECTION, SOLUTION INTRAMUSCULAR; INTRAVENOUS; SUBCUTANEOUS at 16:23

## 2025-01-08 RX ADMIN — HYDROMORPHONE HYDROCHLORIDE 0.5 MG: 1 INJECTION, SOLUTION INTRAMUSCULAR; INTRAVENOUS; SUBCUTANEOUS at 16:35

## 2025-01-08 RX ADMIN — TRIAMCINOLONE ACETONIDE 40 ML: 40 INJECTION, SUSPENSION INTRA-ARTICULAR; INTRAMUSCULAR at 15:09

## 2025-01-08 RX ADMIN — HYDROMORPHONE HYDROCHLORIDE 0.5 MG: 1 INJECTION, SOLUTION INTRAMUSCULAR; INTRAVENOUS; SUBCUTANEOUS at 15:15

## 2025-01-08 RX ADMIN — DULOXETINE HYDROCHLORIDE 20 MG: 20 CAPSULE, DELAYED RELEASE ORAL at 20:00

## 2025-01-08 RX ADMIN — CARBIDOPA AND LEVODOPA 1 TABLET: 25; 100 TABLET ORAL at 21:00

## 2025-01-08 RX ADMIN — ONDANSETRON 4 MG: 2 INJECTION INTRAMUSCULAR; INTRAVENOUS at 15:24

## 2025-01-08 RX ADMIN — HEPARIN SODIUM 7500 UNITS: 5000 INJECTION INTRAVENOUS; SUBCUTANEOUS at 21:00

## 2025-01-08 RX ADMIN — CEFAZOLIN 2 G: 1 INJECTION, POWDER, FOR SOLUTION INTRAMUSCULAR; INTRAVENOUS at 13:11

## 2025-01-08 RX ADMIN — HYDROMORPHONE HYDROCHLORIDE 0.5 MG: 1 INJECTION, SOLUTION INTRAMUSCULAR; INTRAVENOUS; SUBCUTANEOUS at 13:49

## 2025-01-08 SDOH — SOCIAL STABILITY: SOCIAL INSECURITY: WITHIN THE LAST YEAR, HAVE YOU BEEN HUMILIATED OR EMOTIONALLY ABUSED IN OTHER WAYS BY YOUR PARTNER OR EX-PARTNER?: NO

## 2025-01-08 SDOH — SOCIAL STABILITY: SOCIAL INSECURITY: ARE THERE ANY APPARENT SIGNS OF INJURIES/BEHAVIORS THAT COULD BE RELATED TO ABUSE/NEGLECT?: NO

## 2025-01-08 SDOH — HEALTH STABILITY: MENTAL HEALTH: HOW OFTEN DO YOU HAVE A DRINK CONTAINING ALCOHOL?: NEVER

## 2025-01-08 SDOH — ECONOMIC STABILITY: HOUSING INSECURITY: DO YOU FEEL UNSAFE GOING BACK TO THE PLACE WHERE YOU LIVE?: NO

## 2025-01-08 SDOH — SOCIAL STABILITY: SOCIAL INSECURITY: DO YOU FEEL UNSAFE GOING BACK TO THE PLACE WHERE YOU ARE LIVING?: NO

## 2025-01-08 SDOH — ECONOMIC STABILITY: INCOME INSECURITY: IN THE PAST 12 MONTHS HAS THE ELECTRIC, GAS, OIL, OR WATER COMPANY THREATENED TO SHUT OFF SERVICES IN YOUR HOME?: NO

## 2025-01-08 SDOH — SOCIAL STABILITY: SOCIAL INSECURITY: HAS ANYONE EVER THREATENED TO HURT YOUR FAMILY OR YOUR PETS?: NO

## 2025-01-08 SDOH — SOCIAL STABILITY: SOCIAL INSECURITY
WITHIN THE LAST YEAR, HAVE YOU BEEN RAPED OR FORCED TO HAVE ANY KIND OF SEXUAL ACTIVITY BY YOUR PARTNER OR EX-PARTNER?: NO

## 2025-01-08 SDOH — SOCIAL STABILITY: SOCIAL INSECURITY: HAVE YOU HAD ANY THOUGHTS OF HARMING ANYONE ELSE?: NO

## 2025-01-08 SDOH — HEALTH STABILITY: MENTAL HEALTH: HOW OFTEN DO YOU HAVE SIX OR MORE DRINKS ON ONE OCCASION?: NEVER

## 2025-01-08 SDOH — SOCIAL STABILITY: SOCIAL INSECURITY: ARE YOU OR HAVE YOU BEEN THREATENED OR ABUSED PHYSICALLY, EMOTIONALLY, OR SEXUALLY BY ANYONE?: NO

## 2025-01-08 SDOH — SOCIAL STABILITY: SOCIAL INSECURITY: DOES ANYONE TRY TO KEEP YOU FROM HAVING/CONTACTING OTHER FRIENDS OR DOING THINGS OUTSIDE YOUR HOME?: NO

## 2025-01-08 SDOH — SOCIAL STABILITY: SOCIAL INSECURITY: WITHIN THE LAST YEAR, HAVE YOU BEEN AFRAID OF YOUR PARTNER OR EX-PARTNER?: NO

## 2025-01-08 SDOH — ECONOMIC STABILITY: FOOD INSECURITY: WITHIN THE PAST 12 MONTHS, YOU WORRIED THAT YOUR FOOD WOULD RUN OUT BEFORE YOU GOT THE MONEY TO BUY MORE.: NEVER TRUE

## 2025-01-08 SDOH — SOCIAL STABILITY: SOCIAL INSECURITY
ASK PARENT OR GUARDIAN: ARE THERE TIMES WHEN YOU, YOUR CHILD(REN), OR ANY MEMBER OF YOUR HOUSEHOLD FEEL UNSAFE, HARMED, OR THREATENED AROUND PERSONS WITH WHOM YOU KNOW OR LIVE?: NO

## 2025-01-08 SDOH — ECONOMIC STABILITY: FOOD INSECURITY: WITHIN THE PAST 12 MONTHS, THE FOOD YOU BOUGHT JUST DIDN'T LAST AND YOU DIDN'T HAVE MONEY TO GET MORE.: NEVER TRUE

## 2025-01-08 SDOH — SOCIAL STABILITY: SOCIAL INSECURITY: DO YOU FEEL ANYONE HAS EXPLOITED OR TAKEN ADVANTAGE OF YOU FINANCIALLY OR OF YOUR PERSONAL PROPERTY?: NO

## 2025-01-08 SDOH — SOCIAL STABILITY: SOCIAL INSECURITY: WERE YOU ABLE TO COMPLETE ALL THE BEHAVIORAL HEALTH SCREENINGS?: NO

## 2025-01-08 SDOH — SOCIAL STABILITY: SOCIAL INSECURITY
WITHIN THE LAST YEAR, HAVE YOU BEEN KICKED, HIT, SLAPPED, OR OTHERWISE PHYSICALLY HURT BY YOUR PARTNER OR EX-PARTNER?: NO

## 2025-01-08 SDOH — SOCIAL STABILITY: SOCIAL INSECURITY: ABUSE: ADULT

## 2025-01-08 SDOH — SOCIAL STABILITY: SOCIAL INSECURITY: HAVE YOU HAD THOUGHTS OF HARMING ANYONE ELSE?: NO

## 2025-01-08 SDOH — HEALTH STABILITY: MENTAL HEALTH: HOW MANY DRINKS CONTAINING ALCOHOL DO YOU HAVE ON A TYPICAL DAY WHEN YOU ARE DRINKING?: PATIENT DOES NOT DRINK

## 2025-01-08 ASSESSMENT — ACTIVITIES OF DAILY LIVING (ADL)
PATIENT'S MEMORY ADEQUATE TO SAFELY COMPLETE DAILY ACTIVITIES?: YES
FEEDING YOURSELF: INDEPENDENT
DRESSING YOURSELF: INDEPENDENT
TOILETING: NEEDS ASSISTANCE
ADEQUATE_TO_COMPLETE_ADL: YES
BATHING: INDEPENDENT
JUDGMENT_ADEQUATE_SAFELY_COMPLETE_DAILY_ACTIVITIES: YES
GROOMING: INDEPENDENT
HEARING - LEFT EAR: FUNCTIONAL
HEARING - RIGHT EAR: FUNCTIONAL
LACK_OF_TRANSPORTATION: NO
WALKS IN HOME: INDEPENDENT

## 2025-01-08 ASSESSMENT — PAIN - FUNCTIONAL ASSESSMENT
PAIN_FUNCTIONAL_ASSESSMENT: UNABLE TO SELF-REPORT
PAIN_FUNCTIONAL_ASSESSMENT: 0-10
PAIN_FUNCTIONAL_ASSESSMENT: 0-10
PAIN_FUNCTIONAL_ASSESSMENT: UNABLE TO SELF-REPORT
PAIN_FUNCTIONAL_ASSESSMENT: 0-10

## 2025-01-08 ASSESSMENT — PATIENT HEALTH QUESTIONNAIRE - PHQ9
2. FEELING DOWN, DEPRESSED OR HOPELESS: NOT AT ALL
1. LITTLE INTEREST OR PLEASURE IN DOING THINGS: NOT AT ALL
SUM OF ALL RESPONSES TO PHQ9 QUESTIONS 1 & 2: 0

## 2025-01-08 ASSESSMENT — COGNITIVE AND FUNCTIONAL STATUS - GENERAL
TURNING FROM BACK TO SIDE WHILE IN FLAT BAD: A LITTLE
TOILETING: A LITTLE
HELP NEEDED FOR BATHING: A LITTLE
WALKING IN HOSPITAL ROOM: A LITTLE
DRESSING REGULAR UPPER BODY CLOTHING: A LITTLE
MOVING TO AND FROM BED TO CHAIR: A LITTLE
CLIMB 3 TO 5 STEPS WITH RAILING: A LITTLE
MOVING FROM LYING ON BACK TO SITTING ON SIDE OF FLAT BED WITH BEDRAILS: A LITTLE
PATIENT BASELINE BEDBOUND: NO
STANDING UP FROM CHAIR USING ARMS: A LITTLE
DAILY ACTIVITIY SCORE: 18
EATING MEALS: A LITTLE
MOBILITY SCORE: 18
DRESSING REGULAR LOWER BODY CLOTHING: A LITTLE
PERSONAL GROOMING: A LITTLE

## 2025-01-08 ASSESSMENT — PAIN SCALES - GENERAL
PAINLEVEL_OUTOF10: 8
PAINLEVEL_OUTOF10: 0 - NO PAIN
PAINLEVEL_OUTOF10: 8
PAINLEVEL_OUTOF10: 8
PAINLEVEL_OUTOF10: 3
PAINLEVEL_OUTOF10: 0 - NO PAIN

## 2025-01-08 ASSESSMENT — LIFESTYLE VARIABLES
HOW OFTEN DO YOU HAVE 6 OR MORE DRINKS ON ONE OCCASION: NEVER
AUDIT-C TOTAL SCORE: 0
HOW OFTEN DO YOU HAVE A DRINK CONTAINING ALCOHOL: NEVER
SKIP TO QUESTIONS 9-10: 1
SKIP TO QUESTIONS 9-10: 1
HOW MANY STANDARD DRINKS CONTAINING ALCOHOL DO YOU HAVE ON A TYPICAL DAY: PATIENT DOES NOT DRINK

## 2025-01-08 NOTE — CARE PLAN
Problem: Pain  Goal: Takes deep breaths with improved pain control throughout the shift  Outcome: Progressing  Goal: Turns in bed with improved pain control throughout the shift  Outcome: Progressing  Goal: Walks with improved pain control throughout the shift  Outcome: Progressing  Goal: Performs ADL's with improved pain control throughout shift  Outcome: Progressing  Goal: Participates in PT with improved pain control throughout the shift  Outcome: Progressing  Goal: Free from opioid side effects throughout the shift  Outcome: Progressing  Goal: Free from acute confusion related to pain meds throughout the shift  Outcome: Progressing     Problem: Skin  Goal: Decreased wound size/increased tissue granulation at next dressing change  Outcome: Progressing  Goal: Participates in plan/prevention/treatment measures  Outcome: Progressing  Goal: Prevent/manage excess moisture  Outcome: Progressing  Goal: Prevent/minimize sheer/friction injuries  Outcome: Progressing  Goal: Promote/optimize nutrition  Outcome: Progressing  Goal: Promote skin healing  Outcome: Progressing     Problem: Fall/Injury  Goal: Not fall by end of shift  Outcome: Progressing  Goal: Be free from injury by end of the shift  Outcome: Progressing  Goal: Verbalize understanding of personal risk factors for fall in the hospital  Outcome: Progressing  Goal: Verbalize understanding of risk factor reduction measures to prevent injury from fall in the home  Outcome: Progressing  Goal: Use assistive devices by end of the shift  Outcome: Progressing  Goal: Pace activities to prevent fatigue by end of the shift  Outcome: Progressing

## 2025-01-08 NOTE — ANESTHESIA PROCEDURE NOTES
Airway  Date/Time: 1/8/2025 1:02 PM  Urgency: elective    Airway not difficult    Staffing  Performed: DWAYNE   Authorized by: Trinidad Valdes MD    Performed by: DWAYNE Gómez  Patient location during procedure: OR    Indications and Patient Condition  Indications for airway management: anesthesia  Spontaneous ventilation: present  Sedation level: deep  Preoxygenated: yes  Patient position: sniffing  Mask difficulty assessment: 1 - vent by mask    Final Airway Details  Final airway type: endotracheal airway      Successful airway: ETT  Cuffed: yes   Successful intubation technique: direct laryngoscopy  Facilitating devices/methods: intubating stylet and cricoid pressure  Endotracheal tube insertion site: oral  Blade: Terrance  Blade size: #4  ETT size (mm): 7.5  Cormack-Lehane Classification: grade I - full view of glottis  Placement verified by: capnometry and palpation of cuff   Measured from: lips  ETT to lips (cm): 22  Number of attempts at approach: 1

## 2025-01-08 NOTE — OP NOTE
HYSTERECTOMY, ROBOT-ASSISTED, WITH SALPINGO-OOPHORECTOMY (B), [place] lymph node mapping with biopsies, surgical staging, all indicated procedures (B) Operative Note     Date: 2025  OR Location: [facility] OR    Name: Dina Mullins, : 1950, Age: 74 y.o., MRN: [id number], Sex: female    Diagnosis  Pre-op Diagnosis      * Post-menopausal bleeding [N95.0]     * Endometrial cancer (Multi) [C54.1] Post-op Diagnosis     * Post-menopausal bleeding [N95.0]     * Endometrial cancer (Multi) [C54.1]     Procedures  HYSTERECTOMY, ROBOT-ASSISTED, WITH SALPINGO-OOPHORECTOMY  77363 - LA LAPS TOTAL HYSTERECT 250 GM/< W/RMVL TUBE/OVARY    [place] lymph node mapping with biopsies, surgical staging, all indicated procedures  76350 - LA LAPS SURG BILATERAL TOTAL PELVIC LMPHADECTOMY      Surgeons      * Mei Black - Primary    Resident/Fellow/Other Assistant:  Surgeons and Role:     * Saima Smith MD - Resident - Assisting     * Norma Evans MD - Resident - Assisting     * Abhishek Rao MD - Fellow    Staff:   Circulator: [place]  Scrub Person: Ariana  Scrub Person: Ruth Ann  Relief Scrub: Glenna  Circulator: Elda  Relief Scrub: Leonor  Relief Circulator: Kelsey  Relief Circulator: Ariana    Anesthesia Staff: Anesthesiologist: Trinidad Valdes MD  C-AA: DWAYNE Gómez    Procedure Summary  Anesthesia: General  ASA: III  Estimated Blood Loss: 10mL  Intra-op Medications:   Administrations occurring from 1250 to 1800 on 25:   Medication Name Total Dose   surgical lubricant gel 1 Application   sodium chloride 0.9 % irrigation solution 1,000 mL   indocyanine green (IC-Green) injection 25 mg   BUPivacaine HCl (Marcaine) 0.5 % (5 mg/mL) 30 mL, triamcinolone acetonide (Kenalog-40) 40 mg/mL 1 mL in sodium chloride 0.9% 10 mL syringe Cannot be calculated   ceFAZolin (Ancef) vial 1 g 2 g   dexAMETHasone (Decadron) injection 4 mg/mL 8 mg   fentaNYL (Sublimaze) injection 50 mcg/mL 100 mcg   HYDROmorphone (Dilaudid)  injection 1 mg/mL 1 mg   LR bolus Cannot be calculated   methocarbamol (Robaxin) 100 mg/mL 1,000 mg   midazolam PF (Versed) injection 1 mg/mL 2 mg   ondansetron (Zofran) 2 mg/mL injection 4 mg   propofol (Diprivan) injection 10 mg/mL 200 mg   rocuronium (ZeMuron) 50 mg/5 mL injection 120 mg              Anesthesia Record               Intraprocedure I/O Totals       None           Specimen:   ID Type Source Tests Collected by Time   1 : PELVIC WASHING Non-Gynecologic Cytology PELVIC WASHING CYTOLOGY CONSULTATION (NON-GYNECOLOGIC) Mei Black MD 1/8/2025 1349   2 : LEFT EXTERNAL ILIAC SENTINEL LYMPH NODE Tissue SENTINEL LYMPH NODE OTHER SURGICAL PATHOLOGY EXAM Mei Black MD 1/8/2025 1402   3 : RIGHT OBTURATOR SENTINEL LYMPH NODE Tissue SENTINEL LYMPH NODE OTHER SURGICAL PATHOLOGY EXAM Mei Black MD 1/8/2025 1418   4 : UTERUS, CERVIX, BILATERAL TUBES AND OVARIES Tissue UTERUS, CERVIX, FALLOPIAN TUBES AND OVARIES BILATERAL SURGICAL PATHOLOGY EXAM Mei Black MD 1/8/2025 1502                 Drains and/or Catheters:   Urethral Catheter Non-latex 16 Fr. (Active)             Findings: Unremarkable uterus, cervix, bilateral fallopian tubes, and ovaries. Normal external female genitalia and vagina. ICG dye mapped to sentinel lymph nodes in the right obturator fossa and along the left external iliac vessels. Unremarkable omentum, peritoneum, and liver surface.     Indications: Dina Mullins is an 74 y.o. female who is having surgery for Post-menopausal bleeding [N95.0]  Endometrial cancer (Multi) [C54.1].     The patient was seen in the preoperative area. The risks, benefits, complications, treatment options, non-operative alternatives, expected recovery and outcomes were discussed with the patient. The possibilities of reaction to medication, pulmonary aspiration, injury to surrounding structures, bleeding, recurrent infection, the need for additional procedures, failure to diagnose a condition, and  creating a complication requiring transfusion or operation were discussed with the patient. The patient concurred with the proposed plan, giving informed consent.  The site of surgery was properly noted/marked if necessary per policy. The patient has been actively warmed in preoperative area. Preoperative antibiotics have been ordered and given within 1 hours of incision. Venous thrombosis prophylaxis have been ordered including bilateral sequential compression devices and chemical prophylaxis    Procedure Details:   The patient was identified in preoperative holding area with correct name and medical record number. Informed consent was reviewed and all remaining questions answered. The patient was then taken to the operating room where sequential compression devices were placed and perioperative antibiotics were administered. The patient was placed under general anesthesia and then positioned in dorsal lithotomy position. After sterile prep and draping, a Younger catheter and speculum were placed. A single-tooth tenaculum was placed on the anterior lip of the cervix. 1 mL of dilute Indo-Cyanine dye was was injected at 3 and 9 o'clock in the cervical stroma as per the FIRES study protocol. The cervix was dilated and a V-care uterine manipulator was inserted into the uterus without difficulty. The tenaculum and speculum were removed from the vagina.      Attention was then turned to the abdomen where a 12mm supraumbilical incision was made. The fascia was identified under direct visualization, grasped with two kocher clamps, and incised sharply. The fascia was tagged with an 0-Vicryl suture. The peritoneum was then grasped, elevated with two Tere clamps, and entered sharply with Metzenbaum scissors. A 12mm gel port was then inserted. The robotic camera was inserted, and entry into the peritoneal cavity was confirmed under direct visualization. Pneumoperitoneum was established. No evidence of injury was appreciated at  the entry site. An upper abdominal survey was performed and unremarkable. Robotic trocars were then placed under direct visualization as follows: two 8mm trochars in the right abdomen and an 8mm trochar in the left abdomen. An AirSeal port was placed in the left lateral abdomen under direct visualization without difficulty. The patient was placed in steep Trendelenburg, and the bowels were moved into the upper abdomen. The Meilelei robotic surgical system was brought to the patient's bedside and docked.     A pelvic survey revealed an unremarkable uterus, fallopian tubes, and ovaries. The uterus was then anteverted. The left and right round ligaments were identified, cauterized, and ligated. The retroperitoneum was entered and the peritoneum was incised in a plane lateral and parallel to the ovarian blood supply bilaterally. The left and right paravesical and pararectal spaces were developed. The left and right ureters were visualized and noted to be well posterior to the ovarian blood supply. [place] Lymph Node Dissection: The bilateral peritoneum was incised and the retroperitoneum entered. The bilateral paravesical and pararectal spaces were opened and a sentinel lymph node dissection was performed. A right sentinel lymph node was identified by tracing the channel of ICG dye directly from the cervix. The lymph node was isolated and dissected free from the underlying neurovascular structures. A left sentinel lymph node was also identified by tracing the channel of ICG dye directly from the cervix. The lymph node was isolated and dissected in a similar fashion. The right and left sentinel lymph nodes were then removed through the Airport and sent to pathology for frozen section.     The right infundibulopelvic ligament was skeletonized, coagulated, and ligated. The right posterior peritoneum was then incised taking care to note the location of the ureter. The peritoneal dissection was then carried anteriorly, and  the bladder was carefully dissected off the uterus and cervix to create a bladder flap. The same process was then repeated on the left side. The uterine vessels were then skeletonized, coagulated, and ligated bilaterally. We performed ligated down the length of the cervix until we were at the level of the internal os utilizing the V Care manipulator's cup as a geographic land elvi. A circumferential colpotomy was then performed with cautery. The uterus, cervix, bilateral fallopian tubes, and bilateral ovaries were then delivered through the vagina and sent for permanent section.     All pedicles were inspected and were completely hemostatic. A pneumo-occluder was placed in the vagina to maintain pneumoperitoneum. The vaginal cuff was re-approximated using #0 V-loc suture in a running fashion. Copious irrigation was performed and hemostasis was appreciated.      The robot was undocked. All ports were removed under direct visualization. The fascia of the supraumbilical incision was close with a figure of eight stitch of 0-Vicryl. All skin incisions were then closed with 3-0 Monocryl in a subcuticular fashion. Dermabond vs. Steristrips: Steristrips were applied as well as island dressings. The Younger catheter and pneumo-occluder were removed. The patient tolerated the procedure well.      Sponge, lap, and needle counts were correct x2. Dr. Black was present for the key portions of the procedure. General anesthesia was reversed. The patient tolerated the procedure well and was returned to the PACU in stable condition.   Complications:  None; patient tolerated the procedure well.    Disposition: PACU - hemodynamically stable.  Condition: stable     Abhishek Rao MD  Gynecologic Oncology Fellow    Additional Details: None    Attending Attestation:     Mei Black  Phone Number: [phone]

## 2025-01-08 NOTE — ANESTHESIA PROCEDURE NOTES
Peripheral IV  Date/Time: 1/8/2025 1:16 PM  Inserted by: DWAYNE Gómez    Placement  Needle size: 18 G  Location: wrist  Local anesthetic: none  Site prep: chlorhexidine

## 2025-01-08 NOTE — H&P
History Of Present Illness  Dina Mullins is a 74 y.o. female presenting with endometrial cancer.     Past Medical History  Past Medical History:   Diagnosis Date    Anemia     Anxiety     Arrhythmia     PAfib    Arthritis     Atrial fib/flutter, transient (Multi)     Bilateral tinnitus     Breast cancer (Multi)     Cataract     Chronic kidney failure     stage three    CKD (chronic kidney disease)     3b    Depression     DM (diabetes mellitus) (Multi)     Endometrial cancer (Multi)     Hyperlipidemia     Hypertension     Left arm pain     Obesity     Parkinson disease (Multi)     Post-menopausal bleeding     Sepsis (Multi)     Septic shock (Multi) 04/2019    Type 2 diabetes mellitus     Uterine cancer (Multi)     Vertigo     Vitamin D deficiency        Surgical History  Past Surgical History:   Procedure Laterality Date    BIOPSY  2024    uterine    BREAST BIOPSY Right 07/01/2024    IDC    BREAST LUMPECTOMY Right 08/07/2024    BREAST LUMPECTOMY Right     LYMPH NODE DISSECTION Right 08/07/2024        Social History  She reports that she has never smoked. She has never been exposed to tobacco smoke. She has never used smokeless tobacco. She reports that she does not drink alcohol and does not use drugs.    Family History  Family History   Problem Relation Name Age of Onset    Cancer Mother      Breast cancer Mother  69    Alzheimer's disease Father          Allergies  Codeine    Review of Systems   All other systems reviewed and are negative.       Physical Exam  Vitals and nursing note reviewed.   Constitutional:       General: She is not in acute distress.     Appearance: Normal appearance.   HENT:      Head: Normocephalic and atraumatic.      Mouth/Throat:      Mouth: Mucous membranes are moist.      Pharynx: Oropharynx is clear.   Eyes:      Extraocular Movements: Extraocular movements intact.      Conjunctiva/sclera: Conjunctivae normal.      Pupils: Pupils are equal, round, and reactive to light.    Cardiovascular:      Rate and Rhythm: Normal rate and regular rhythm.      Pulses: Normal pulses.   Pulmonary:      Effort: Pulmonary effort is normal.      Breath sounds: Normal breath sounds. No wheezing, rhonchi or rales.   Abdominal:      General: There is no distension.      Palpations: Abdomen is soft. There is no mass.      Tenderness: There is no abdominal tenderness.   Genitourinary:     Comments: Deferred  Musculoskeletal:         General: No swelling or tenderness. Normal range of motion.      Cervical back: Normal range of motion and neck supple.   Skin:     General: Skin is warm.      Findings: No rash.   Neurological:      General: No focal deficit present.      Mental Status: She is alert and oriented to person, place, and time.   Psychiatric:         Mood and Affect: Mood normal.         Behavior: Behavior normal.          Last Recorded Vitals  Blood pressure 137/74, pulse 74, temperature 36.4 °C (97.5 °F), temperature source Temporal, resp. rate 22, SpO2 100%.    Relevant Results             Assessment/Plan   Assessment & Plan  Post-menopausal bleeding    Endometrial cancer (Multi)      To OR for planned robotic hysterectomy/BSO with lymph node biopsies       I spent 10 minutes in the professional and overall care of this patient.      Mei Black MD

## 2025-01-08 NOTE — NURSING NOTE
Dina Mullins admitted to [facility] from _PACU__ on 01/08/25 at 6:14 PM   PT verified by NAME AND DOB____.  Anticipated discharge disposition: home  RN signature: ОЛЬГА HANKINS

## 2025-01-08 NOTE — SIGNIFICANT EVENT
POST OPERATIVE CHECK    Dina Mullins is a 74 y.o. who is POD#0 from a robotic hysterectomy, BSO, sentinel lymph node mapping and biopsies in the setting of endometrial cancer.     SUBJECTIVE   Patient is doing well. Pain is managed with medications. Tolerating PO w/o nausea or vomiting. Ambulating to and from the bathroom and voiding freely. Denies fevers/chills, SOB, chest pain.    OBJECTIVE   Physical Exam  Vitals reviewed.   Constitutional:       Appearance: Normal appearance.   HENT:      Head: Normocephalic and atraumatic.   Eyes:      Extraocular Movements: Extraocular movements intact.   Pulmonary:      Effort: Pulmonary effort is normal.   Abdominal:      General: Abdomen is flat.      Palpations: Abdomen is soft.      Comments: Appropriately tender post-operatively. Robotic port sites with island dressing overlying, c/d/I.    Musculoskeletal:      Cervical back: Normal range of motion.   Neurological:      Mental Status: She is alert.         Last Recorded Vitals  Visit Vitals  /78   Pulse 95   Temp 36.4 °C (97.5 °F) (Temporal)   Resp 16   SpO2 98%   OB Status Postmenopausal   Smoking Status Never       Intake/Output past 24h    Intake/Output Summary (Last 24 hours) at 1/8/2025 2112  Last data filed at 1/8/2025 1835  Gross per 24 hour   Intake 620 ml   Output 475 ml   Net 145 ml       Medications  Scheduled medications  acetaminophen, 975 mg, oral, q6h  atorvastatin, 40 mg, oral, Nightly  carbidopa-levodopa, 1 tablet, oral, TID  DULoxetine, 20 mg, oral, Daily  heparin (porcine), 7,500 Units, subcutaneous, q8h BRYANT  [START ON 1/9/2025] insulin glargine, 25 Units, subcutaneous, q24h  [START ON 1/9/2025] insulin lispro, 0-20 Units, subcutaneous, TID AC  [START ON 1/9/2025] lisinopril, 10 mg, oral, Daily  polyethylene glycol, 17 g, oral, Daily      Continuous medications     PRN medications  PRN medications: naloxone, ondansetron ODT **OR** ondansetron, simethicone, traMADol,  traMADol    Labs  Results for orders placed or performed during the hospital encounter of 01/08/25 (from the past 24 hours)   POCT GLUCOSE   Result Value Ref Range    POCT Glucose 95 74 - 99 mg/dL   POCT GLUCOSE   Result Value Ref Range    POCT Glucose 114 (H) 74 - 99 mg/dL     *Note: Due to a large number of results and/or encounters for the requested time period, some results have not been displayed. A complete set of results can be found in Results Review.       Images  No results found.     ASSESSMENT AND PLAN  Dina Mullins is a 74 y.o. who is POD#0 from a robotic hysterectomy, BSO, sentinel lymph node mapping and biopsies in the setting of endometrial cancer.     Post-operative state   - Pain well controlled with pain medications per ERAS pathway   - VS wnl   - Saturating well ORA. IS q1hr   - Regular diet. IVF: LR 40cc/hr until taking PO regularly  - Antiemetics PRN  - Younger removed in OR, voiding freely    Comorbidities  - Br Ca  - T2DM: lantus 25u in AM (home med) and SSI ordered  - HTN: lisinopril cont, lasix held  - HLD  - CKD, avoid NSAIDs/other nephrotoxic meds  - Anxiety: duloxetine cont  - Parkinsons: sinemet cont  - A fib: xarelto held postop, on heparin currently    DVT PPx  - SCDs, encourage early ambulation  - Heparin Ppx    Dispo: continue inpatient management until meeting postoperative milestones    Vilma Linares MD   PGY-2, Gynecologic Oncology  Pager [phone]

## 2025-01-08 NOTE — ANESTHESIA POSTPROCEDURE EVALUATION
Patient: Dina Mullnis    Procedure Summary       Date: 01/08/25 Room / Location: [facility] OR 03 / [facility]    Anesthesia Start: 1254 Anesthesia Stop: 1609    Procedures:       HYSTERECTOMY, ROBOT-ASSISTED, WITH SALPINGO-OOPHORECTOMY (Bilateral)      [place] lymph node mapping with biopsies, surgical staging, all indicated procedures (Bilateral: Pelvis) Diagnosis:       Post-menopausal bleeding      Endometrial cancer (Multi)      (Post-menopausal bleeding [N95.0])      (Endometrial cancer (Multi) [C54.1])    Surgeons: Mei Black MD Responsible Provider: Trinidad Valdes MD    Anesthesia Type: general ASA Status: 3            Anesthesia Type: general    Vitals Value Taken Time   /65 01/08/25 1609   Temp 36.3 01/08/25 1609   Pulse 95 01/08/25 1609   Resp 18 01/08/25 1609   SpO2 100% 01/08/25 1609       Anesthesia Post Evaluation    Patient location during evaluation: PACU  Patient participation: complete - patient participated  Level of consciousness: awake and alert  Pain management: adequate  Airway patency: patent  Cardiovascular status: acceptable  Respiratory status: acceptable  Hydration status: acceptable  Postoperative Nausea and Vomiting: none        No notable events documented.

## 2025-01-09 LAB
GLUCOSE BLD MANUAL STRIP-MCNC: 188 MG/DL (ref 74–99)
GLUCOSE BLD MANUAL STRIP-MCNC: 222 MG/DL (ref 74–99)
GLUCOSE BLD MANUAL STRIP-MCNC: 313 MG/DL (ref 74–99)
GLUCOSE BLD MANUAL STRIP-MCNC: 324 MG/DL (ref 74–99)
LABORATORY COMMENT REPORT: NORMAL
LABORATORY COMMENT REPORT: NORMAL
PATH REPORT.FINAL DX SPEC: NORMAL
PATH REPORT.GROSS SPEC: NORMAL
PATH REPORT.RELEVANT HX SPEC: NORMAL
PATH REPORT.TOTAL CANCER: NORMAL
RESIDENT REVIEW: NORMAL

## 2025-01-09 PROCEDURE — 96372 THER/PROPH/DIAG INJ SC/IM: CPT

## 2025-01-09 PROCEDURE — 2500000004 HC RX 250 GENERAL PHARMACY W/ HCPCS (ALT 636 FOR OP/ED)

## 2025-01-09 PROCEDURE — 97161 PT EVAL LOW COMPLEX 20 MIN: CPT | Mod: GP

## 2025-01-09 PROCEDURE — 2500000001 HC RX 250 WO HCPCS SELF ADMINISTERED DRUGS (ALT 637 FOR MEDICARE OP): Performed by: NURSE PRACTITIONER

## 2025-01-09 PROCEDURE — 99024 POSTOP FOLLOW-UP VISIT: CPT

## 2025-01-09 PROCEDURE — 82947 ASSAY GLUCOSE BLOOD QUANT: CPT

## 2025-01-09 PROCEDURE — 1170000001 HC PRIVATE ONCOLOGY ROOM DAILY

## 2025-01-09 PROCEDURE — 2500000002 HC RX 250 W HCPCS SELF ADMINISTERED DRUGS (ALT 637 FOR MEDICARE OP, ALT 636 FOR OP/ED)

## 2025-01-09 PROCEDURE — 2500000001 HC RX 250 WO HCPCS SELF ADMINISTERED DRUGS (ALT 637 FOR MEDICARE OP): Performed by: STUDENT IN AN ORGANIZED HEALTH CARE EDUCATION/TRAINING PROGRAM

## 2025-01-09 PROCEDURE — 2500000001 HC RX 250 WO HCPCS SELF ADMINISTERED DRUGS (ALT 637 FOR MEDICARE OP)

## 2025-01-09 RX ORDER — POLYETHYLENE GLYCOL 3350 17 G/17G
17 POWDER, FOR SOLUTION ORAL DAILY PRN
Status: DISCONTINUED | OUTPATIENT
Start: 2025-01-09 | End: 2025-01-11 | Stop reason: HOSPADM

## 2025-01-09 RX ORDER — AMOXICILLIN 250 MG
2 CAPSULE ORAL 2 TIMES DAILY
Status: DISCONTINUED | OUTPATIENT
Start: 2025-01-09 | End: 2025-01-11 | Stop reason: HOSPADM

## 2025-01-09 RX ORDER — TRAMADOL HYDROCHLORIDE 50 MG/1
50 TABLET ORAL EVERY 6 HOURS PRN
Qty: 12 TABLET | Refills: 0 | Status: SHIPPED | OUTPATIENT
Start: 2025-01-09 | End: 2025-01-11

## 2025-01-09 RX ORDER — BISACODYL 10 MG/1
10 SUPPOSITORY RECTAL DAILY PRN
Status: DISCONTINUED | OUTPATIENT
Start: 2025-01-09 | End: 2025-01-11 | Stop reason: HOSPADM

## 2025-01-09 RX ORDER — SIMETHICONE 80 MG
80 TABLET,CHEWABLE ORAL 4 TIMES DAILY PRN
COMMUNITY
Start: 2025-01-09

## 2025-01-09 RX ORDER — AMOXICILLIN 250 MG
2 CAPSULE ORAL 2 TIMES DAILY
Qty: 120 TABLET | Refills: 1 | Status: SHIPPED | OUTPATIENT
Start: 2025-01-09 | End: 2025-01-11

## 2025-01-09 RX ORDER — LIDOCAINE 560 MG/1
1 PATCH PERCUTANEOUS; TOPICAL; TRANSDERMAL DAILY
Qty: 14 PATCH | Refills: 0 | Status: SHIPPED | OUTPATIENT
Start: 2025-01-09 | End: 2025-01-11

## 2025-01-09 RX ORDER — LIDOCAINE 560 MG/1
1 PATCH PERCUTANEOUS; TOPICAL; TRANSDERMAL DAILY
Status: DISCONTINUED | OUTPATIENT
Start: 2025-01-09 | End: 2025-01-11 | Stop reason: HOSPADM

## 2025-01-09 RX ADMIN — ACETAMINOPHEN 975 MG: 325 TABLET ORAL at 11:49

## 2025-01-09 RX ADMIN — CARBIDOPA AND LEVODOPA 1 TABLET: 25; 100 TABLET ORAL at 09:12

## 2025-01-09 RX ADMIN — HEPARIN SODIUM 7500 UNITS: 5000 INJECTION INTRAVENOUS; SUBCUTANEOUS at 05:42

## 2025-01-09 RX ADMIN — SENNOSIDES AND DOCUSATE SODIUM 2 TABLET: 50; 8.6 TABLET ORAL at 09:11

## 2025-01-09 RX ADMIN — HEPARIN SODIUM 7500 UNITS: 5000 INJECTION INTRAVENOUS; SUBCUTANEOUS at 21:25

## 2025-01-09 RX ADMIN — CARBIDOPA AND LEVODOPA 1 TABLET: 25; 100 TABLET ORAL at 21:25

## 2025-01-09 RX ADMIN — HEPARIN SODIUM 7500 UNITS: 5000 INJECTION INTRAVENOUS; SUBCUTANEOUS at 15:17

## 2025-01-09 RX ADMIN — ATORVASTATIN CALCIUM 40 MG: 40 TABLET, FILM COATED ORAL at 21:25

## 2025-01-09 RX ADMIN — ACETAMINOPHEN 975 MG: 325 TABLET ORAL at 19:05

## 2025-01-09 RX ADMIN — SENNOSIDES AND DOCUSATE SODIUM 2 TABLET: 50; 8.6 TABLET ORAL at 21:25

## 2025-01-09 RX ADMIN — CARBIDOPA AND LEVODOPA 1 TABLET: 25; 100 TABLET ORAL at 15:17

## 2025-01-09 RX ADMIN — TRAMADOL HYDROCHLORIDE 100 MG: 50 TABLET, COATED ORAL at 11:49

## 2025-01-09 RX ADMIN — DULOXETINE HYDROCHLORIDE 20 MG: 20 CAPSULE, DELAYED RELEASE ORAL at 09:12

## 2025-01-09 RX ADMIN — INSULIN LISPRO 16 UNITS: 100 INJECTION, SOLUTION INTRAVENOUS; SUBCUTANEOUS at 16:04

## 2025-01-09 RX ADMIN — INSULIN GLARGINE 25 UNITS: 100 INJECTION, SOLUTION SUBCUTANEOUS at 09:15

## 2025-01-09 RX ADMIN — LISINOPRIL 10 MG: 10 TABLET ORAL at 09:11

## 2025-01-09 ASSESSMENT — COGNITIVE AND FUNCTIONAL STATUS - GENERAL
MOVING FROM LYING ON BACK TO SITTING ON SIDE OF FLAT BED WITH BEDRAILS: A LOT
EATING MEALS: A LITTLE
HELP NEEDED FOR BATHING: A LITTLE
TURNING FROM BACK TO SIDE WHILE IN FLAT BAD: A LOT
DRESSING REGULAR UPPER BODY CLOTHING: A LITTLE
DRESSING REGULAR LOWER BODY CLOTHING: A LITTLE
TOILETING: A LITTLE
MOVING TO AND FROM BED TO CHAIR: A LITTLE
DAILY ACTIVITIY SCORE: 18
CLIMB 3 TO 5 STEPS WITH RAILING: A LOT
TURNING FROM BACK TO SIDE WHILE IN FLAT BAD: A LITTLE
WALKING IN HOSPITAL ROOM: A LITTLE
WALKING IN HOSPITAL ROOM: A LOT
STANDING UP FROM CHAIR USING ARMS: A LITTLE
MOBILITY SCORE: 17
STANDING UP FROM CHAIR USING ARMS: TOTAL
MOVING FROM LYING ON BACK TO SITTING ON SIDE OF FLAT BED WITH BEDRAILS: A LITTLE
MOVING TO AND FROM BED TO CHAIR: TOTAL
CLIMB 3 TO 5 STEPS WITH RAILING: TOTAL
MOBILITY SCORE: 9
PERSONAL GROOMING: A LITTLE

## 2025-01-09 ASSESSMENT — PAIN SCALES - GENERAL
PAINLEVEL_OUTOF10: 9
PAINLEVEL_OUTOF10: 0 - NO PAIN
PAINLEVEL_OUTOF10: 8
PAINLEVEL_OUTOF10: 4

## 2025-01-09 ASSESSMENT — PAIN - FUNCTIONAL ASSESSMENT
PAIN_FUNCTIONAL_ASSESSMENT: 0-10

## 2025-01-09 ASSESSMENT — ACTIVITIES OF DAILY LIVING (ADL): ADL_ASSISTANCE: INDEPENDENT

## 2025-01-09 ASSESSMENT — PAIN DESCRIPTION - LOCATION: LOCATION: ABDOMEN

## 2025-01-09 NOTE — PROGRESS NOTES
Dina Mullins is a 74 y.o. female on day 1 of admission after RA-TLH, BSO, SLND.    Subjective   Pain is currently a 6/10, tolerable. Ate dinner overnight without N/V. Voiding with purewick in place. Not yet passing flatus. Has not ambulated since surgery.       Objective   Last Recorded Vitals  Blood pressure 138/80, pulse 73, temperature 36.2 °C (97.2 °F), temperature source Temporal, resp. rate 16, SpO2 95%.  Intake/Output last 3 Shifts:    Intake/Output Summary (Last 24 hours) at 1/9/2025 0692  Last data filed at 1/9/2025 0441  Gross per 24 hour   Intake 620 ml   Output 825 ml   Net -205 ml     Physical Exam  General: Alert and oriented, in NAD  : purewick in place draining dark yellow urine  Neuro: Awake, alert, conversational  CV: Regular rate  Respiratory: Even and unlabored on RA  Abdominal: soft, nontender, nondistended. 5 Port site incisions covered with island dressings with minimal strikethrough, dry.  Extremities: Full ROM  Psych: appropriate mood and affect    Assessment/Plan     Dina Mullins is a 74 y.o. who is POD#1 from a robotic hysterectomy, BSO, sentinel lymph node mapping and biopsies in the setting of endometrial cancer.      Post-operative state   - Pain controlled with pain medications per ERAS pathway   - VS wnl   - Saturating well ORA. IS q1hr   - Regular diet. IVF: LR 40cc/hr until taking PO regularly  - Antiemetics PRN  - Younger removed in OR, voiding freely  - Pending PT eval for ambulation     Comorbidities  - Br Ca  - T2DM: lantus 25u in AM (home med) and SSI ordered  - HTN: lisinopril cont, lasix held  - HLD  - CKD, avoid NSAIDs/other nephrotoxic meds  - Anxiety: duloxetine cont  - Parkinsons: sinemet cont  - A fib: xarelto held postop, on heparin currently     DVT PPx  - SCDs, encourage early ambulation  - Heparin Ppx     Dispo: continue inpatient management until meeting postoperative milestones. Anticipate discharge today after PT eval      To be discussed with   Joel Evans MD PGY2  GynOnc Pager [phone]

## 2025-01-09 NOTE — CARE PLAN
Problem: Pain  Goal: Takes deep breaths with improved pain control throughout the shift  Outcome: Progressing  Goal: Turns in bed with improved pain control throughout the shift  Outcome: Progressing  Goal: Walks with improved pain control throughout the shift  Outcome: Progressing  Goal: Performs ADL's with improved pain control throughout shift  Outcome: Progressing  Goal: Participates in PT with improved pain control throughout the shift  Outcome: Progressing  Goal: Free from opioid side effects throughout the shift  Outcome: Progressing  Goal: Free from acute confusion related to pain meds throughout the shift  Outcome: Progressing     Problem: Skin  Goal: Decreased wound size/increased tissue granulation at next dressing change  Outcome: Progressing  Goal: Participates in plan/prevention/treatment measures  Outcome: Progressing  Goal: Prevent/manage excess moisture  Outcome: Progressing  Goal: Prevent/minimize sheer/friction injuries  Outcome: Progressing  Goal: Promote/optimize nutrition  Outcome: Progressing  Goal: Promote skin healing  Outcome: Progressing     Problem: Fall/Injury  Goal: Not fall by end of shift  Outcome: Progressing  Goal: Be free from injury by end of the shift  Outcome: Progressing  Goal: Verbalize understanding of personal risk factors for fall in the hospital  Outcome: Progressing  Goal: Verbalize understanding of risk factor reduction measures to prevent injury from fall in the home  Outcome: Progressing  Goal: Use assistive devices by end of the shift  Outcome: Progressing  Goal: Pace activities to prevent fatigue by end of the shift  Outcome: Progressing   The patient's goals for the shift include      The clinical goals for the shift include patient to remain hemodynamically stable

## 2025-01-09 NOTE — PROGRESS NOTES
01/09/25 1200   Discharge Planning   Living Arrangements Spouse/significant other   Support Systems Spouse/significant other;Friends/neighbors   Assistance Needed none   Type of Residence Private residence   Who is requesting discharge planning? Provider   Home or Post Acute Services Post acute facilities (Rehab/SNF/etc)   Type of Post Acute Facility Services Skilled nursing   Expected Discharge Disposition SNF     Dina Mullins is a 74 y.o. who is POD#1 from a robotic hysterectomy, BSO, sentinel lymph node mapping and biopsies in the setting of endometrial cancer. ADOD 1/09/25, PT recommends SNF.     Met with patient at bedside. Introduced self as care coordinator and role in discharge planning. Patient lives at home with her  who is currently at a SNF for therapy. Patient states she lives in a 3 story home, with 3 stairs to enter the second floor and 10 to enter the third floor where the bedrooms are. She has a walker and cane, but mostly uses the cane. No O2 requirements. Not active with home care. Son is able to transport at discharge. PT recommends SNF. Patient requesting to go to [facility] where her  is currently undergoing therapy. Referral sent. Facility able to accept patient 1/10/25 afternoon. Medical team updated. Will continue to follow. Priya Espinosa RN TCC

## 2025-01-09 NOTE — CARE PLAN
The patient's goals for the shift include      The clinical goals for the shift include pt will remain safe and free from injury    Problem: Pain  Goal: Takes deep breaths with improved pain control throughout the shift  Outcome: Progressing  Goal: Turns in bed with improved pain control throughout the shift  Outcome: Progressing  Goal: Walks with improved pain control throughout the shift  Outcome: Progressing  Goal: Performs ADL's with improved pain control throughout shift  Outcome: Progressing  Goal: Participates in PT with improved pain control throughout the shift  Outcome: Progressing  Goal: Free from opioid side effects throughout the shift  Outcome: Progressing  Goal: Free from acute confusion related to pain meds throughout the shift  Outcome: Progressing     Problem: Skin  Goal: Decreased wound size/increased tissue granulation at next dressing change  Outcome: Progressing  Flowsheets (Taken 1/9/2025 1146)  Decreased wound size/increased tissue granulation at next dressing change: Promote sleep for wound healing  Goal: Participates in plan/prevention/treatment measures  Outcome: Progressing  Flowsheets (Taken 1/9/2025 1146)  Participates in plan/prevention/treatment measures:   Discuss with provider PT/OT consult   Elevate heels  Goal: Prevent/manage excess moisture  Outcome: Progressing  Flowsheets (Taken 1/9/2025 1146)  Prevent/manage excess moisture:   Use wicking fabric (obtain order)   Moisturize dry skin   Monitor for/manage infection if present  Goal: Prevent/minimize sheer/friction injuries  Outcome: Progressing  Flowsheets (Taken 1/9/2025 1146)  Prevent/minimize sheer/friction injuries:   Use pull sheet   Increase activity/out of bed for meals  Goal: Promote/optimize nutrition  Outcome: Progressing  Flowsheets (Taken 1/9/2025 1146)  Promote/optimize nutrition:   Consume > 50% meals/supplements   Offer water/supplements/favorite foods  Goal: Promote skin healing  Outcome: Progressing  Flowsheets  (Taken 1/9/2025 1146)  Promote skin healing:   Rotate device position/do not position patient on device   Protective dressings over bony prominences   Ensure correct size (line/device) and apply per  instructions   Assess skin/pad under line(s)/device(s)     Problem: Fall/Injury  Goal: Not fall by end of shift  Outcome: Progressing  Goal: Be free from injury by end of the shift  Outcome: Progressing  Goal: Verbalize understanding of personal risk factors for fall in the hospital  Outcome: Progressing  Goal: Verbalize understanding of risk factor reduction measures to prevent injury from fall in the home  Outcome: Progressing  Goal: Use assistive devices by end of the shift  Outcome: Progressing  Goal: Pace activities to prevent fatigue by end of the shift  Outcome: Progressing

## 2025-01-09 NOTE — DISCHARGE INSTRUCTIONS
Laparoscopic Hysterectomy Discharge Instructions  If you have any questions about your care, please contact us at [phone].    Medications and Pain Management  Common areas of pain after laparoscopic hysterectomy include the incision pain, pain in between your shoulder blades, the pelvis and lower back. The gas that was used to distend your abdomen for the surgery is absorbed slowly into your blood stream over the first 3-4 days after surgery. It is not passed intestinally, although, because your abdomen is distended, it may feel similar to intestinal gas. Staying active and walking is the best way to promote the absorption of this gas.  Immediately after surgery, nerve pain is the most intense, typically for the first 6 to 12 hours. As the body heals, it creates inflammation around the incisions sites adding pressure and creating soreness. After 5 days, the inflammation begins to recede and significant improvement in soreness is expected. Pulling on the incisions, especially if sudden, such as when you cough, will reactivate the nerve pain. Support your abdomen with a pillow during coughing or sneezing as this will be helpful to minimize pain. The opiod medication can be used on a schedule for the first 1 to 2 days but after that, only as you need it. Opiods can cause constipation, nausea, sleepiness and headaches. You may begin your usual home medications as you were taking before unless directed by your doctor.    Incisional care  Paper tape steri-strips are typically used for the abdominal incisions. The steri-strips will fall off on their own or can be removed at your first post-operative appointment. You may shower and use a mild soap around the incisions and pat dry. Do not use a washcloth or scrub the incisions. Using peroxide or antiseptics is not recommended for routine care. Avoid hot and steamy showers as this may cause you to feel faint. No tub baths for six weeks following surgery. There may be  discoloration or bruising around the incisions. This is normal and may take several weeks to resolve. Firmness or a nodular area under the skin near the incision may represent a collection of blood, this too will resolve on its own after a little time. If any incision develops tenderness, redness in the skin layer or has drainage please call the office.    Vaginal Discharge  You may have a mildly malodorous discharge and occasional spotting for up to 6 weeks. Do not put anything in the vagina like tampons or have sexual intercourse for 6 weeks after surgery. If you are having bleeding like a period, that is abnormal and you should contact your doctor.    GI Function, Nausea and Constipation  Nausea can occasionally be an issue in the first few days after surgery. It is usually caused as a side effect from the anesthesia and pain medicine, particularly narcotics. Taking the pain pills with food is a food way to proactively minimize this. Throwing up, especially after the first day, is not expected and if this happens, you should call your doctor. Feeling gassy and constipation can be a problem for the first week after surgery. Limiting the use of narcotics may be helpful. Stool softeners twice a day and a high fiber diet are safe. If needed, Miralax once daily is a good choice. If no bowel movement after 3 days, you will need to increase the Miralax until soft, regular bowel movements are passing.    Urinating  Because the bladder is disturbed by the surgery, the normal sensation may be temporarily altered. You may not be aware that your bladder is full. If the bladder is allowed to get over distended, it may make the problem worse. This is why we make sure that you are able to empty your bladder adequately before you go home. For the first few days at home, you should make a point to empty your bladder every 3 to 4 hours. Pain with voiding, especially after the first day, is not expected and may represent a bladder  infection.    Activity  For the first two days post-operatively, your soreness and recovery from anesthesia will limit your activity  Stairs are safe, just take your time  At a minimum during this time, you should walk around for 10-15 minutes every 2-3 hours. After that, in the first week, any activity except for overt exercise is safe.   During the first week you should not commit to being on your feet for more than 30 minutes at a time.   During the second week, light exercise is encouraged.  After 2 weeks from surgery, you should try to get back into regular activity other than heavy lifting.   For healing, please limit the amount of weight lifted to 8-10 pounds (a gallon of milk) for the first 6 weeks after surgery.   Driving is usually okay after the first few days. You must be able to comfortably wear a seatbelt, press the gas/brake pedals, and drive defensively. You may not drive while taking narcotic pain medicines.    When to Call the Doctor  Call for any fever above 100.4 F (If you do not feel feverish you do not have to routinely check your temperature.)  Call for severe pain not improved by medications  Call for persistent nausea, vomiting  Call for vaginal bleeding that is heavy as a period or passing blood clots larger than a quarter  Call for unusual swelling in your legs  Call if the incisions develop painful redness and discharge    In an emergency, call 911 or go to an Emergency Department at a nearby hospital

## 2025-01-09 NOTE — PROGRESS NOTES
Physical Therapy    Physical Therapy Evaluation    Patient Name: Dina Mullins  MRN: [id number]  Today's Date: 1/9/2025   Room: [address] [place] [address]  Time Calculation  Start Time: 1046  Stop Time: 1127  Time Calculation (min): 41 min    Assessment/Plan   PT Assessment  PT Assessment Results: Decreased strength, Decreased endurance, Impaired balance, Decreased mobility, Pain  Rehab Prognosis: Good  Barriers to Discharge Home: Physical needs  Physical Needs: Ambulating household distances limited by function/safety  Evaluation/Treatment Tolerance: Patient tolerated treatment well  Medical Staff Made Aware: Yes  Strengths: Attitude of self  Barriers to Participation: Comorbidities  End of Session Communication: Bedside nurse  Assessment Comment: 74 year old female s/p robotic hysterectomy, BSO, sentinel lymph node mapping and biopsies in the setting of endometrial cancer on 1/8/25. Pt presents with decreased strength, endurance and balance impacting functional mobility. Pt would benefit from continued PT while in hospital to improve functional mobility and return to PLOF.  End of Session Patient Position: Bed, 3 rail up, Alarm off, not on at start of session  IP OR SWING BED PT PLAN  Inpatient or Swing Bed: Inpatient  PT Plan  Treatment/Interventions: Bed mobility, Transfer training, Gait training, Balance training, Neuromuscular re-education, Strengthening, Endurance training, Therapeutic exercise, Therapeutic activity, Home exercise program, Positioning, Postural re-education  PT Plan: Ongoing PT  PT Frequency: 3 times per week  PT Discharge Recommendations: Moderate intensity level of continued care  PT Recommended Transfer Status: Assist x2, Assistive device  PT - OK to Discharge: Yes (PT eval complete and DC rec made)    Subjective   General Visit Information:  Reason for Referral: 74 year old female s/p robotic hysterectomy, BSO, sentinel lymph node mapping and biopsies in the setting of endometrial cancer on  1/8/25.  Past Medical History Relevant to Rehab: breast cancer, T2DM, HTN, HLD,CKD, anxiety,- Parkinsons, afib  Prior to Session Communication: Bedside nurse  Patient Position Received: Up in chair, Alarm off, not on at start of session  General Comment: Pt sitting in chair upon entry to room. Pt pleasant, cooperative and willing to work with PT.   Home Living:  Home Living  Type of Home: House  Lives With: Spouse (multiple cats;  is at rehab for therapy after own health issues)  Home Adaptive Equipment: Walker rolling or standard, Cane (shower chair)  Home Layout: Multi-level, Bed/bath upstairs (split level; family room first level; 3 steps up to second level; 10 up to bed and bathroom with B HR)  Home Access: Level entry  Bathroom Shower/Tub: Walk-in shower  Bathroom Equipment: Grab bars in shower, Shower chair with back  Prior Level of Function:  Prior Function Per Pt/Caregiver Report  Level of Bivins: Independent with ADLs and functional transfers, Needs assistance with homemaking  Receives Help From: Family (adult son usually stops over daily after work to help as needed)  ADL Assistance: Independent  Homemaking Assistance: Needs assistance ( helps complete)  Ambulatory Assistance: Independent (Wang with cane)  Vocational: Retired  Prior Function Comments: pt stating she has had one fall over the last 6 months  Precautions:  Precautions  Medical Precautions: Fall precautions  Post-Surgical Precautions: Abdominal surgery precautions    Objective     Pain:  Pain Assessment  Pain Assessment: 0-10  0-10 (Numeric) Pain Score: 9  Pain Type: Surgical pain  Pain Location: Abdomen  Cognition:  Cognition  Overall Cognitive Status: Within Functional Limits  Orientation Level: Oriented X4    Extremity/Trunk Assessments:  Strength:    RLE   RLE :  (grossly at least 4/5 based on functional observation)  LLE   LLE :  (grossly at least 4/5 based on functional observation)    General Assessments:    Activity  Tolerance  Endurance: Decreased tolerance for upright activites  Sensation  Light Touch: No apparent deficits     Static Sitting Balance  Static Sitting-Level of Assistance: Close supervision  Dynamic Sitting Balance  Dynamic Sitting-Level of Assistance: Close supervision  Static Standing Balance  Static Standing-Level of Assistance: Moderate assistance  Dynamic Standing Balance  Dynamic Standing-Level of Assistance: Moderate assistance    Functional Assessments:  Bed Mobility  Bed Mobility: Yes  Bed Mobility 1  Bed Mobility 1: Sitting to supine  Level of Assistance 1: Maximum assistance, Maximum verbal cues, Maximum tactile cues  Bed Mobility Comments 1: x2 assist  Transfers  Transfer: Yes  Transfer 1  Technique 1: Sit to stand, Stand to sit  Transfer Device 1:  (B arm in arm assist)  Transfer Level of Assistance 1: Maximum assistance, Maximum verbal cues, Maximum tactile cues  Trials/Comments 1: x2 assist; multiple attempts at STS transfer with fww and max assist and B arm in arm assist with 1 person, unable to clear buttocks from chair; additional time was spent waiting for additional staff member to arrive to assist pt in STS transfer  Ambulation/Gait Training  Ambulation/Gait Training Performed: Yes  Ambulation/Gait Training 1  Surface 1: Level tile  Device 1: Rolling walker  Assistance 1: Moderate assistance, Moderate verbal cues, Moderate tactile cues  Quality of Gait 1: Festinating (decreased jose daniel, increased BUE support on fww)  Comments/Distance (ft) 1: 2ft lateral side steps to chair    Outcome Measures:  [facility] Basic Mobility  Turning from your back to your side while in a flat bed without using bedrails: A lot  Moving from lying on your back to sitting on the side of a flat bed without using bedrails: A lot  Moving to and from bed to chair (including a wheelchair): Total  Standing up from a chair using your arms (e.g. wheelchair or bedside chair): Total  To walk in hospital room: A lot  Climbing 3-5  steps with railing: Total  Basic Mobility - Total Score: 9    Encounter Problems       Encounter Problems (Active)       Mobility       Pt will perform bed mobility with min assist.         Start:  01/09/25    Expected End:  01/23/25            Pt will ambulate >25ft with LRAD and min Assist with no LOB and VSS.         Start:  01/09/25    Expected End:  01/23/25            Pt will demonstrate WFL BLE strength to complete therapeutic exercise and participate in functional mobility.         Start:  01/09/25    Expected End:  01/23/25               PT Transfers       Pt will perform all functional transfers with LRAD and min assist.         Start:  01/09/25    Expected End:  01/23/25                   Education Documentation  Mobility Training, taught by Mar Herbert, PT at 1/9/2025  2:11 PM.  Learner: Patient  Readiness: Acceptance  Method: Explanation  Response: Verbalizes Understanding, Needs Reinforcement  Comment: importance of functional mobility    Education Comments  No comments found.      01/09/25 at 2:13 PM   Mar Herbert, PT   Rehab Office: [phone]

## 2025-01-10 LAB
GLUCOSE BLD MANUAL STRIP-MCNC: 100 MG/DL (ref 74–99)
GLUCOSE BLD MANUAL STRIP-MCNC: 134 MG/DL (ref 74–99)
GLUCOSE BLD MANUAL STRIP-MCNC: 251 MG/DL (ref 74–99)
GLUCOSE BLD MANUAL STRIP-MCNC: 86 MG/DL (ref 74–99)

## 2025-01-10 PROCEDURE — 97165 OT EVAL LOW COMPLEX 30 MIN: CPT | Mod: GO

## 2025-01-10 PROCEDURE — 97116 GAIT TRAINING THERAPY: CPT | Mod: GP,CQ

## 2025-01-10 PROCEDURE — 2500000004 HC RX 250 GENERAL PHARMACY W/ HCPCS (ALT 636 FOR OP/ED)

## 2025-01-10 PROCEDURE — 97530 THERAPEUTIC ACTIVITIES: CPT | Mod: GP,CQ

## 2025-01-10 PROCEDURE — 2500000001 HC RX 250 WO HCPCS SELF ADMINISTERED DRUGS (ALT 637 FOR MEDICARE OP): Performed by: NURSE PRACTITIONER

## 2025-01-10 PROCEDURE — 97535 SELF CARE MNGMENT TRAINING: CPT | Mod: GO

## 2025-01-10 PROCEDURE — 2500000001 HC RX 250 WO HCPCS SELF ADMINISTERED DRUGS (ALT 637 FOR MEDICARE OP)

## 2025-01-10 PROCEDURE — 1170000001 HC PRIVATE ONCOLOGY ROOM DAILY

## 2025-01-10 PROCEDURE — 82947 ASSAY GLUCOSE BLOOD QUANT: CPT

## 2025-01-10 PROCEDURE — 2500000002 HC RX 250 W HCPCS SELF ADMINISTERED DRUGS (ALT 637 FOR MEDICARE OP, ALT 636 FOR OP/ED)

## 2025-01-10 PROCEDURE — 2500000001 HC RX 250 WO HCPCS SELF ADMINISTERED DRUGS (ALT 637 FOR MEDICARE OP): Performed by: STUDENT IN AN ORGANIZED HEALTH CARE EDUCATION/TRAINING PROGRAM

## 2025-01-10 RX ORDER — INSULIN LISPRO 100 [IU]/ML
10 INJECTION, SOLUTION INTRAVENOUS; SUBCUTANEOUS
Status: DISCONTINUED | OUTPATIENT
Start: 2025-01-10 | End: 2025-01-11 | Stop reason: HOSPADM

## 2025-01-10 RX ORDER — INSULIN GLARGINE 100 [IU]/ML
35 INJECTION, SOLUTION SUBCUTANEOUS EVERY 24 HOURS
Status: DISCONTINUED | OUTPATIENT
Start: 2025-01-10 | End: 2025-01-11 | Stop reason: HOSPADM

## 2025-01-10 RX ORDER — INSULIN GLARGINE 100 [IU]/ML
35 INJECTION, SOLUTION SUBCUTANEOUS EVERY MORNING
Qty: 10.5 ML | Refills: 1 | Status: SHIPPED | OUTPATIENT
Start: 2025-01-10 | End: 2025-01-11

## 2025-01-10 RX ORDER — INSULIN LISPRO 100 [IU]/ML
10 INJECTION, SOLUTION INTRAVENOUS; SUBCUTANEOUS
Qty: 9 ML | Refills: 1 | Status: SHIPPED | OUTPATIENT
Start: 2025-01-10 | End: 2025-01-11

## 2025-01-10 RX ADMIN — INSULIN GLARGINE 35 UNITS: 100 INJECTION, SOLUTION SUBCUTANEOUS at 09:55

## 2025-01-10 RX ADMIN — CARBIDOPA AND LEVODOPA 1 TABLET: 25; 100 TABLET ORAL at 09:55

## 2025-01-10 RX ADMIN — CARBIDOPA AND LEVODOPA 1 TABLET: 25; 100 TABLET ORAL at 20:53

## 2025-01-10 RX ADMIN — ACETAMINOPHEN 975 MG: 325 TABLET ORAL at 14:38

## 2025-01-10 RX ADMIN — HEPARIN SODIUM 7500 UNITS: 5000 INJECTION INTRAVENOUS; SUBCUTANEOUS at 21:00

## 2025-01-10 RX ADMIN — CARBIDOPA AND LEVODOPA 1 TABLET: 25; 100 TABLET ORAL at 14:38

## 2025-01-10 RX ADMIN — INSULIN LISPRO 10 UNITS: 100 INJECTION, SOLUTION INTRAVENOUS; SUBCUTANEOUS at 06:49

## 2025-01-10 RX ADMIN — HEPARIN SODIUM 7500 UNITS: 5000 INJECTION INTRAVENOUS; SUBCUTANEOUS at 14:38

## 2025-01-10 RX ADMIN — DULOXETINE HYDROCHLORIDE 20 MG: 20 CAPSULE, DELAYED RELEASE ORAL at 09:55

## 2025-01-10 RX ADMIN — BISACODYL 10 MG: 10 SUPPOSITORY RECTAL at 21:00

## 2025-01-10 RX ADMIN — ATORVASTATIN CALCIUM 40 MG: 40 TABLET, FILM COATED ORAL at 20:53

## 2025-01-10 RX ADMIN — HEPARIN SODIUM 7500 UNITS: 5000 INJECTION INTRAVENOUS; SUBCUTANEOUS at 05:49

## 2025-01-10 RX ADMIN — LISINOPRIL 10 MG: 10 TABLET ORAL at 09:55

## 2025-01-10 ASSESSMENT — COGNITIVE AND FUNCTIONAL STATUS - GENERAL
DRESSING REGULAR LOWER BODY CLOTHING: A LOT
MOVING TO AND FROM BED TO CHAIR: A LOT
STANDING UP FROM CHAIR USING ARMS: A LOT
TURNING FROM BACK TO SIDE WHILE IN FLAT BAD: A LOT
HELP NEEDED FOR BATHING: A LOT
WALKING IN HOSPITAL ROOM: A LOT
DAILY ACTIVITIY SCORE: 16
DRESSING REGULAR UPPER BODY CLOTHING: A LITTLE
TOILETING: A LOT
MOBILITY SCORE: 11
PERSONAL GROOMING: A LITTLE
MOVING FROM LYING ON BACK TO SITTING ON SIDE OF FLAT BED WITH BEDRAILS: A LOT
CLIMB 3 TO 5 STEPS WITH RAILING: TOTAL

## 2025-01-10 ASSESSMENT — PAIN SCALES - GENERAL
PAINLEVEL_OUTOF10: 4
PAINLEVEL_OUTOF10: 7
PAINLEVEL_OUTOF10: 0 - NO PAIN
PAINLEVEL_OUTOF10: 0 - NO PAIN

## 2025-01-10 ASSESSMENT — PAIN - FUNCTIONAL ASSESSMENT
PAIN_FUNCTIONAL_ASSESSMENT: 0-10

## 2025-01-10 ASSESSMENT — ACTIVITIES OF DAILY LIVING (ADL)
BATHING_ASSISTANCE: MODERATE
HOME_MANAGEMENT_TIME_ENTRY: 10
ADL_ASSISTANCE: INDEPENDENT

## 2025-01-10 NOTE — PROGRESS NOTES
Occupational Therapy    Evaluation and Treatment    Patient Name: Dina Mullins  MRN: [id number]  Today's Date: 1/10/2025  Room: [address] [place], [place] [address]  Time Calculation  Start Time: 1008  Stop Time: 1033  Time Calculation (min): 25 min    Assessment  IP OT Assessment  OT Assessment: Pt demos deficits in activity tolerance, balance, mobility, strength, ROM, and functional mobility/transfers resulting in the need for increased assist w/ I/ADLs and the continued need for skilled OT services at Mod intensity to allow for a safe and functional d/c.  Prognosis: Good  Barriers to Discharge Home: Caregiver assistance, Physical needs  Caregiver Assistance: Caregiver assistance needed per identified barriers - however, level of patient's required assistance exceeds assistance available at home  Physical Needs: Stair navigation to access bed limited by function/safety, Stair navigation to access bath limited by function/safety, 24hr mobility assistance needed, Intermittent ADL assistance needed  Evaluation/Treatment Tolerance: Patient tolerated treatment well  Medical Staff Made Aware: Yes  End of Session Communication: Bedside nurse  End of Session Patient Position: Up in chair, Alarm off, not on at start of session  Plan:  Inpatient Plan  Treatment Interventions: ADL retraining, Functional transfer training, UE strengthening/ROM, Endurance training, Patient/family training, Equipment evaluation/education, Compensatory technique education  OT Frequency: 3 times per week  OT Discharge Recommendations: Moderate intensity level of continued care  OT Recommended Transfer Status: Moderate assist  OT - OK to Discharge: Yes  OT Assessment  OT Assessment Results: Decreased ADL status, Decreased endurance, Decreased functional mobility, Decreased IADLs  Prognosis: Good  Evaluation/Treatment Tolerance: Patient tolerated treatment well  Medical Staff Made Aware: Yes  Strengths: Ability to acquire knowledge, Attitude of self, Coping skills,  Premorbid level of function  Barriers to Participation: Comorbidities    Subjective   Current Problem:  1. Post-menopausal bleeding  Cytology Consultation (Non-Gynecologic)    Cytology Consultation (Non-Gynecologic)    Surgical Pathology Exam    Surgical Pathology Exam      2. Endometrial cancer (Multi)  Cytology Consultation (Non-Gynecologic)    Cytology Consultation (Non-Gynecologic)    Surgical Pathology Exam    Surgical Pathology Exam      3. Postoperative pain  lidocaine 4 % patch    traMADol (Ultram) 50 mg tablet      4. Status post hysterectomy  simethicone (Mylicon) 80 mg chewable tablet    sennosides-docusate sodium (Dionna-Colace) 8.6-50 mg tablet        General:  Reason for Referral: This 75 y/o female w/ endometrial CA presents s/p robot assisted hysterectomy, BSO, sentinel lymph node mapping and biopsies.  Past Medical History Relevant to Rehab: Anemia, anxiety, pAFib, arthritis, breast CA, CKD3, depression, T2DM, endometrial CA, HLD, HTN, sepsis, Uterine CA, vertigo  Co-Treatment: PT  Co-Treatment Reason:  (Partial Co-tx for PT for ASAP precert and OT recs)  Prior to Session Communication: Bedside nurse  Patient Position Received: Bed, 2 rail up, Alarm off, not on at start of session  General Comment: Pt sitting EOB w/ PTA, RN, and CTA. Pt pleasant and agreeable to OT joining session for OT assessment   Precautions:  Medical Precautions: Fall precautions  Post-Surgical Precautions: Abdominal surgery precautions  Precautions Comment: pt edu on abd precautions  Vital Signs:  Vital Signs (Past 2hrs)        Date/Time Vitals Session Patient Position Pulse Resp SpO2 BP MAP (mmHg)    01/10/25 1126 --  --  64  16  95 %  121/66  84                   Pain:  Pain Assessment  Pain Assessment:  (Pt did not verbalize pain during session)    Objective   Cognition:  Overall Cognitive Status: Within Functional Limits  Orientation Level: Oriented X4  Insight: Mild  Impulsive: Within functional limits  Home Living:  Type  of Home: House  Lives With: Spouse ( is currently in rehab for own health concerns.)  Home Adaptive Equipment: Walker rolling or standard, Cane, Reacher (lift chair)  Home Layout: Multi-level, Bed/bath upstairs (split level; family room first level; 3 steps up to second level; 10 up to bed and bathroom with B HR)  Home Access: Level entry  Bathroom Shower/Tub: Walk-in shower  Bathroom Toilet: Handicapped height  Bathroom Equipment: Grab bars in shower, Shower chair with back  Home Living Comments: Pt reports spouse provided occasional assist for transfers off toilet and kitchen chair.   Prior Function:  Level of [place]: Independent with ADLs and functional transfers, Needs assistance with homemaking  Receives Help From: Family (adult son usually stops over daily after work to help as needed)  ADL Assistance: Independent  Homemaking Assistance: Needs assistance (Assist from )  Ambulatory Assistance: Independent (MI w/ SPC)  Vocational: Retired ()  Leisure: Playing with her 7 cats  Hand Dominance: Right  Prior Function Comments: + driving; 1 fall in past 6 mo  ADL:  Eating Assistance: Independent  Grooming Assistance: Stand by  Bathing Assistance: Moderate  UE Dressing Assistance: Stand by  LE Dressing Assistance: Maximal  Toileting Assistance with Device: Maximal  Activity Tolerance:  Endurance: Tolerates less than 10 min exercise, no significant change in vital signs  Bed Mobility/Transfers: Bed Mobility/Transfers: Functional Mobility  Functional Mobility Performed: Yes  Functional Mobility 1  Surface 1: Level tile  Device 1: Rolling walker  Assistance 1: Minimum assistance (x2)  Comments 1: Min household distance   and Transfer 1  Technique 1: Sit to stand  Transfer Device 1: Walker  Transfer Level of Assistance 1: Minimum assistance, +2  Trials/Comments 1: 2x throughout session from chair w/ arms (Min x2); Verbal cues for hand and foot placement and sequencing task.  Vision: Vision -  Basic Assessment  Current Vision: Wears glasses only for reading  Sensation:  Sensation Comment: No apparent deficits  Strength:  Strength Comments: BUE WFL  Coordination:  Movements are Fluid and Coordinated: Yes   Hand Function:  Hand Function  Gross Grasp: Functional  Coordination: Functional  Extremities:   RUE   RUE : Within Functional Limits, LUE   LUE: Within Functional Limits  Outcome Measures: [facility] Daily Activity  Putting on and taking off regular lower body clothing: A lot  Bathing (including washing, rinsing, drying): A lot  Putting on and taking off regular upper body clothing: A little  Toileting, which includes using toilet, bedpan or urinal: A lot  Taking care of personal grooming such as brushing teeth: A little  Eating Meals: None  Daily Activity - Total Score: 16         ,     OT Adult Other Outcome Measures  4AT: 0    Education Documentation  Body Mechanics, taught by Kathleen Rivas OT at 1/10/2025 12:05 PM.  Learner: Patient  Readiness: Acceptance  Method: Explanation  Response: Verbalizes Understanding    Precautions, taught by Kathleen Rivas OT at 1/10/2025 12:05 PM.  Learner: Patient  Readiness: Acceptance  Method: Explanation  Response: Verbalizes Understanding    ADL Training, taught by Kathleen Rivas OT at 1/10/2025 12:05 PM.  Learner: Patient  Readiness: Acceptance  Method: Explanation  Response: Verbalizes Understanding    Education Comments  No comments found.        Goals:   Encounter Problems       Encounter Problems (Active)       ADLs       Patient will perform UB and LB bathing with modified independent level of assistance and shower chair.       Start:  01/10/25    Expected End:  01/24/25            Patient with complete lower body dressing with modified independent level of assistance donning and doffing all LE clothes  with PRN adaptive equipment while edge of bed        Start:  01/10/25    Expected End:  01/24/25            Patient will complete toileting including hygiene  clothing management/hygiene with modified independent level of assistance and raised toilet seat.       Start:  01/10/25    Expected End:  01/24/25               MOBILITY       Patient will perform Functional mobility mod  Household distances/Community Distances with modified independent level of assistance and least restrictive device in order to improve safety and functional mobility.       Start:  01/10/25    Expected End:  01/24/25               TRANSFERS       Patient will complete functional transfer to chair/toilet with least restrictive device with modified independent level of assistance.       Start:  01/10/25    Expected End:  01/24/25                   Treatment Completed on Evaluation    Activities of Daily Living: Toilet Transfers  Toilet Transfer From: Walker  Toilet Transfer Type: To and from  Toilet Transfer to: Raised toilet seat with rails  Toilet Transfer Technique:  (Stand<>sit)  Toilet Transfers: Moderate assistance    Toileting  Toileting Level of Assistance: Maximum assistance  Where Assessed: Toilet  Toileting Comments: Assist for clothing managment and boby hygiene      01/10/25 at 12:07 PM   KEN MANZANO OT   Rehab Office: [phone]

## 2025-01-10 NOTE — PROGRESS NOTES
Physical Therapy    Physical Therapy Treatment    Patient Name: Dina Mullins  MRN: [id number]  [facility]  Room: [facility]-  Today's Date: 1/10/2025  Time Calculation  Start Time: 1001  Stop Time: 1302  Time Calculation (min): 181 min  Initial treatment time 10:01-10:25  Returned to pt's room from 12:53-13:02 to assist back to bed.   Total treatment time: 34 minutes       Assessment/Plan   PT Assessment  End of Session Communication: Bedside nurse  Assessment Comment: Pt tolerated PT session well, able to progress ambulation up into bathroom with assist x1-2 and use of FWW. Pt continues to remain appropriate for Moderate intensity PT upon D/C from hospital.  End of Session Patient Position: Bed, 3 rail up, Alarm off, not on at start of session  PT Plan  Inpatient/Swing Bed or Outpatient: Inpatient  PT Plan  Treatment/Interventions: Bed mobility, Transfer training, Gait training, Balance training, Neuromuscular re-education, Strengthening, Endurance training, Therapeutic exercise, Therapeutic activity, Home exercise program, Positioning, Postural re-education  PT Plan: Ongoing PT  PT Frequency: 3 times per week  PT Discharge Recommendations: Moderate intensity level of continued care  Equipment Recommended upon Discharge: Wheeled walker  PT Recommended Transfer Status: Assist x1, Assist x2, Assistive device  PT - OK to Discharge: Yes (PT eval complete and DC rec made)      General Visit Information:   PT  Visit  PT Received On: 01/10/25  General  Missed Visit Reason:  (n/a)  Family/Caregiver Present: No  Co-Treatment: OT  Co-Treatment Reason: Partial co-tx with OT to maximize pt safety with progression of ambulation.  Prior to Session Communication: Bedside nurse  Patient Position Received: Bed, 3 rail up, Alarm off, not on at start of session  General Comment: Pt supine in bed, agreeable to work with PT. Eager to get up OOB. Returned to pt's room 12:52-13:02 to assist pt back to bed    Subjective    Precautions:  Precautions  Medical Precautions: Fall precautions  Post-Surgical Precautions: Abdominal surgery precautions            Objective   Pain:  Pain Assessment  Pain Assessment: 0-10  0-10 (Numeric) Pain Score: 7  Pain Type: Surgical pain  Pain Location: Abdomen  Cognition:  Cognition  Overall Cognitive Status: Within Functional Limits  Orientation Level: Oriented X4    Activity Tolerance:  Activity Tolerance  Endurance: Tolerates 10 - 20 min exercise with multiple rests  Treatments:  Therapeutic Exercise  Therapeutic Exercise Performed: Yes  Therapeutic Exercise Activity 1: Seated: Heel/Toe Raises, LAQ, Hip Flexion x10 BLE    Bed Mobility  Bed Mobility: Yes  Bed Mobility 1  Bed Mobility 1: Supine to sitting  Level of Assistance 1: Moderate assistance, Minimal verbal cues  Bed Mobility Comments 1: Assist at trunk and LEs to advance OOB  Bed Mobility 2  Bed Mobility  2: Sitting to supine  Level of Assistance 2: Moderate assistance, Minimal verbal cues  Bed Mobility Comments 2: Assist with LEs to advance in to bed    Ambulation/Gait Training  Ambulation/Gait Training Performed: Yes  Ambulation/Gait Training 1  Surface 1: Level tile  Device 1: Rolling walker  Assistance 1: Minimum assistance, Moderate verbal cues (x2)  Quality of Gait 1: Diminished heel strike, Decreased step length, Forward flexed posture, Shuffling gait (Decreased jose daniel, slightly unsteady)  Comments/Distance (ft) 1: 8ft x2 trials, seated break while up in bathroom. Cues for safe sequencing and walker management.  Ambulation/Gait Training 2  Surface 2: Level tile  Device 2: Rolling walker  Assistance 2: Moderate assistance, Moderate verbal cues  Quality of Gait 2: Diminished heel strike, Decreased step length, Shuffling gait (Decreased jose daniel,)  Comments/Distance (ft) 2: x3ft, cues for walker management.    Transfers  Transfer: Yes  Transfer 1  Transfer From 1: Bed to  Transfer to 1: Stand  Technique 1: Sit to stand, Stand to  sit  Transfer Device 1: Walker  Transfer Level of Assistance 1: Moderate assistance, Minimum assistance, Minimal verbal cues (ModAx1, MinAx2)  Trials/Comments 1: x2 trials , ModA initial, MiNAx2 2nd attempt, raised EOB, cues for safe sequencing.  Transfers 2  Transfer From 2: Chair with arms to  Transfer to 2: Stand  Technique 2: Sit to stand, Stand to sit  Transfer Device 2: Walker  Transfer Level of Assistance 2: Minimum assistance, +2, Moderate assistance, Minimal verbal cues (MinAx2 for initial, ModA for second)  Trials/Comments 2: x2 trials, cues for safe hand placement and LE placement.  Transfers 3  Transfer From 3: Toilet to  Transfer to 3: Stand  Technique 3: Sit to stand, Stand to sit  Transfer Device 3: Walker  Transfer Level of Assistance 3: Moderate assistance, Minimal verbal cues  Trials/Comments 3: x1 trial, cues for sequencing, and use of grabbar    Stairs  Stairs: No    Outcome Measures:  [facility] Basic Mobility  Turning from your back to your side while in a flat bed without using bedrails: A lot  Moving from lying on your back to sitting on the side of a flat bed without using bedrails: A lot  Moving to and from bed to chair (including a wheelchair): A lot  Standing up from a chair using your arms (e.g. wheelchair or bedside chair): A lot  To walk in hospital room: A lot  Climbing 3-5 steps with railing: Total  Basic Mobility - Total Score: 11    Education Documentation  Mobility Training, taught by Tanesha Clemons PTA at 1/10/2025  2:23 PM.  Learner: Patient  Readiness: Acceptance  Method: Explanation  Response: Verbalizes Understanding, Needs Reinforcement  Comment: Safety with walker during mobility, safe transfer positioning.    Education Comments  No comments found.        OP EDUCATION:       Encounter Problems       Encounter Problems (Active)       Mobility       Pt will perform bed mobility with min assist.   (Progressing)       Start:  01/09/25    Expected End:  01/23/25            Pt will  ambulate >25ft with LRAD and min Assist with no LOB and VSS.   (Progressing)       Start:  01/09/25    Expected End:  01/23/25            Pt will demonstrate WFL BLE strength to complete therapeutic exercise and participate in functional mobility.   (Progressing)       Start:  01/09/25    Expected End:  01/23/25               PT Transfers       Pt will perform all functional transfers with LRAD and min assist.   (Progressing)       Start:  01/09/25    Expected End:  01/23/25                 01/10/25 at 2:27 PM   Tanesha Clemons [facility]   Rehab Office: [phone]

## 2025-01-10 NOTE — DISCHARGE SUMMARY
Discharge Summary    Admission Date: 1/8/2025  Discharge Date: 1/10/2025    Discharge Diagnosis  Post-menopausal bleeding    Hospital Course  Dina is a 75 yo with grade 2 Endometrial adenocarcinoma and concurrent breast CA. Patient was admitted on 1/8 for observation after her RA-TLH, BSO, SLND. Her procedure was uncomplicated, see the operative report for full details. By POD#1, she was meeting postoperative milestones including: tolerating PO diet and medications and voiding. Her pain was well controlled with PO pain medications, however, she had not yet ambulated. PT was consulted for evaluation and recommended SNF. By POD3, she was medically appropriate for discharge and was transferred to an accepting SNF. She has follow up with Gyn Onc on 02/06/25.      Pertinent Physical Exam At Time of Discharge  General: Alert and oriented, in NAD  : purewick in place draining dark yellow urine  Neuro: Awake, alert, conversational  CV: Regular rate  Respiratory: Even and unlabored on RA  Abdominal: soft, nontender, nondistended. 5 Port site incisions covered with island dressings with minimal strikethrough, dry.  Extremities: Full ROM  Psych: appropriate mood and affect    Last Vitals:  Temp Pulse Resp BP MAP Pulse Ox   37.2 °C (99 °F) 64 16 121/66 84 95 %     Discharge Meds     Your medication list        START taking these medications        Instructions Last Dose Given Next Dose Due   insulin lispro 100 unit/mL injection      Inject 10 Units under the skin 3 times a day before meals. Take as directed per insulin instructions.       lidocaine 4 % patch      Place 1 patch over 12 hours on the skin once daily for 14 days. Remove & discard patch within 12 hours or as directed by MD.       sennosides-docusate sodium 8.6-50 mg tablet  Commonly known as: Dionna-Colace      Take 2 tablets by mouth 2 times a day. While taking Tramadol then as needed for stool softener.       simethicone 80 mg chewable tablet  Commonly known as:  "Mylicon      Chew 1 tablet (80 mg) 4 times a day as needed (gas pain).       traMADol 50 mg tablet  Commonly known as: Ultram      Take 1 tablet (50 mg) by mouth every 6 hours if needed for moderate pain (4 - 6) or severe pain (7 - 10) for up to 3 days.              CHANGE how you take these medications        Instructions Last Dose Given Next Dose Due   furosemide 40 mg tablet  Commonly known as: Lasix  What changed: additional instructions      Take 1 tablet (40 mg) by mouth once daily.       Lantus Solostar U-100 Insulin 100 unit/mL (3 mL) pen  Generic drug: insulin glargine  What changed: how much to take      Inject 35 Units under the skin once daily in the morning.              CONTINUE taking these medications        Instructions Last Dose Given Next Dose Due   acetaminophen 650 mg ER tablet  Commonly known as: Tylenol 8 HOUR           atorvastatin 40 mg tablet  Commonly known as: Lipitor      Take 1 tablet (40 mg) by mouth once daily at bedtime.       carbidopa-levodopa  mg tablet  Commonly known as: Sinemet      Take 1 tablet by mouth 3 times a day.       lisinopril 10 mg tablet      TAKE ONE TABLET BY MOUTH ONCE A DAY       meclizine 25 mg tablet  Commonly known as: Antivert      Take 1 tablet (25 mg) by mouth 3 times a day as needed for dizziness.       megestrol 40 mg tablet  Commonly known as: Megace      Take 1 tablet (40 mg total) by mouth 2 times a day.       OneTouch Verio test strips strip  Generic drug: blood sugar diagnostic      1 strip by in vitro route 4 times a day. And as needed       pen needle, diabetic 32 gauge x 5/32\" needle      USE AS DIRECTED       rivaroxaban 20 mg tablet  Commonly known as: Xarelto      Take 1 tablet (20 mg) by mouth once daily in the evening. Take with meals.              STOP taking these medications      chlorhexidine 4 % external liquid  Commonly known as: Hibiclens        DULoxetine 20 mg DR capsule  Commonly known as: Cymbalta        fluticasone 50 " mcg/actuation nasal spray  Commonly known as: Flonase                  Where to Get Your Medications        These medications were sent to [id number] - [place], [place] - [address]  [address] [facility], [place] [address]      Phone: [phone]   lidocaine 4 % patch  sennosides-docusate sodium 8.6-50 mg tablet  traMADol 50 mg tablet       You can get these medications from any pharmacy    Bring a paper prescription for each of these medications  insulin lispro 100 unit/mL injection  Lantus Solostar U-100 Insulin 100 unit/mL (3 mL) pen  You don't need a prescription for these medications  simethicone 80 mg chewable tablet          Complications Requiring Follow-Up  N/a    Test Results Pending At Discharge  Pending Labs       Order Current Status    Surgical Pathology Exam In process            Outpatient Follow-Up  Future Appointments   Date Time Provider Department Center   1/22/2025 11:30 AM Wendy Farias PA-C [id number] [facility]   2/3/2025 11:30 AM Bib Buitrago MD [id number] [facility]   2/6/2025  2:40 PM Mei Black MD [id number] [facility]   5/29/2025  3:30 PM Evaristo Johnson MD [id number] [facility]       Seen & Discussed with Dr. Joel Evans MD, PGY-2  [facility] Pager [phone]

## 2025-01-10 NOTE — CARE PLAN
Problem: Pain  Goal: Takes deep breaths with improved pain control throughout the shift  Outcome: Progressing  Goal: Turns in bed with improved pain control throughout the shift  Outcome: Progressing  Goal: Walks with improved pain control throughout the shift  Outcome: Progressing  Goal: Performs ADL's with improved pain control throughout shift  Outcome: Progressing  Goal: Participates in PT with improved pain control throughout the shift  Outcome: Progressing  Goal: Free from opioid side effects throughout the shift  Outcome: Progressing  Goal: Free from acute confusion related to pain meds throughout the shift  Outcome: Progressing     Problem: Skin  Goal: Decreased wound size/increased tissue granulation at next dressing change  Outcome: Progressing  Goal: Participates in plan/prevention/treatment measures  Outcome: Progressing  Goal: Prevent/manage excess moisture  Outcome: Progressing  Goal: Prevent/minimize sheer/friction injuries  Outcome: Progressing  Goal: Promote/optimize nutrition  Outcome: Progressing  Goal: Promote skin healing  Outcome: Progressing     Problem: Fall/Injury  Goal: Not fall by end of shift  Outcome: Progressing  Goal: Be free from injury by end of the shift  Outcome: Progressing  Goal: Verbalize understanding of personal risk factors for fall in the hospital  Outcome: Progressing  Goal: Verbalize understanding of risk factor reduction measures to prevent injury from fall in the home  Outcome: Progressing  Goal: Use assistive devices by end of the shift  Outcome: Progressing  Goal: Pace activities to prevent fatigue by end of the shift  Outcome: Progressing   The patient's goals for the shift include      The clinical goals for the shift include pt will remain safe and free from injury

## 2025-01-10 NOTE — CARE PLAN
The patient's goals for the shift include      The clinical goals for the shift include pt will remain safe and free from any falls or injuries      Problem: Skin  Goal: Decreased wound size/increased tissue granulation at next dressing change  Flowsheets (Taken 1/10/2025 1800)  Decreased wound size/increased tissue granulation at next dressing change:   Protective dressings over bony prominences   Utilize specialty bed per algorithm  Goal: Participates in plan/prevention/treatment measures  Flowsheets (Taken 1/10/2025 1800)  Participates in plan/prevention/treatment measures:   Discuss with provider PT/OT consult   Increase activity/out of bed for meals  Goal: Prevent/manage excess moisture  Flowsheets (Taken 1/10/2025 1800)  Prevent/manage excess moisture:   Cleanse incontinence/protect with barrier cream   Follow provider orders for dressing changes  Goal: Prevent/minimize sheer/friction injuries  Flowsheets (Taken 1/10/2025 1800)  Prevent/minimize sheer/friction injuries: Increase activity/out of bed for meals  Goal: Promote/optimize nutrition  Flowsheets (Taken 1/10/2025 1800)  Promote/optimize nutrition: Consume > 50% meals/supplements  Goal: Promote skin healing  Flowsheets (Taken 1/10/2025 1800)  Promote skin healing:   Protective dressings over bony prominences   Ensure correct size (line/device) and apply per  instructions

## 2025-01-10 NOTE — PROGRESS NOTES
01/10/25 1300   Discharge Planning   Living Arrangements Spouse/significant other   Support Systems Spouse/significant other;Friends/neighbors   Type of Residence Private residence   Home or Post Acute Services Post acute facilities (Rehab/SNF/etc)   Type of Post Acute Facility Services Skilled nursing   Expected Discharge Disposition SNF   Does the patient need discharge transport arranged? Yes   RoundTrip coordination needed? Yes   Has discharge transport been arranged? No     Precert has been obtained.  SW messaged [place] re: receiving time,  Awaiting response.  Will update.  SAMANTHA Elliott    Discharge Transfer to Facility  Pt will discharge 1/11 to:  SNF  Facility name:  [facility] phone number:  [phone]  Unit secretary aware.  Phone number for report:  [phone]  Ambulance transport has been arranged:  Yes  Ambulance company:  [facility]  Ambulance phone number:  [phone]   time:  11am  Pt is aware.  SAMANTHA Elliott

## 2025-01-10 NOTE — PROGRESS NOTES
Dina Mullins is a 74 y.o. female on day 2 of admission after RA-TLH, BSO, SLND.    Subjective   Pain was controlled overnight. Continues to tolerate solid food. Voiding with purewick in place. Passing flatus. Has not ambulated since surgery.       Objective   Last Recorded Vitals  Blood pressure 135/56, pulse 69, temperature 36.3 °C (97.3 °F), temperature source Temporal, resp. rate 16, SpO2 96%.  Intake/Output last 3 Shifts:    Intake/Output Summary (Last 24 hours) at 1/10/2025 0653  Last data filed at 1/10/2025 0652  Gross per 24 hour   Intake 720 ml   Output 1500 ml   Net -780 ml     Physical Exam  General: Alert and oriented, in NAD  : purewick in place draining dark yellow urine  Neuro: Awake, alert, conversational  CV: Regular rate  Respiratory: Even and unlabored on RA  Abdominal: soft, nontender, nondistended. 5 Port site incisions covered with island dressings with minimal strikethrough, dry.  Extremities: Full ROM  Psych: appropriate mood and affect    Assessment/Plan     Dina Mullins is a 74 y.o. who is Post op from a robotic hysterectomy, BSO, sentinel lymph node mapping and biopsies on 1/8, in the setting of endometrial cancer.      Post-operative state   - Pain controlled with pain medications per ERAS pathway   - VS wnl   - Saturating well ORA. IS q1hr   - Regular diet. IVF: LR 40cc/hr until taking PO regularly  - Antiemetics PRN  - Younger removed in OR, voiding freely  - PT recs SNF. Precert pending     Comorbidities  - Br Ca  - T2DM: lantus 25u in AM (home med) and SSI ordered  - HTN: lisinopril cont, lasix held  - HLD  - CKD, avoid NSAIDs/other nephrotoxic meds  - Anxiety: duloxetine cont  - Parkinsons: sinemet cont  - A fib: xarelto held postop, on heparin currently     DVT PPx  - SCDs, encourage early ambulation  - Heparin Ppx     Dispo: Pending SNF precert      To be discussed with Dr. Joel Evans MD PGY2  [facility] Pager [phone]

## 2025-01-11 VITALS
DIASTOLIC BLOOD PRESSURE: 81 MMHG | RESPIRATION RATE: 18 BRPM | HEART RATE: 66 BPM | OXYGEN SATURATION: 94 % | SYSTOLIC BLOOD PRESSURE: 141 MMHG | TEMPERATURE: 97.7 F

## 2025-01-11 LAB — GLUCOSE BLD MANUAL STRIP-MCNC: 136 MG/DL (ref 74–99)

## 2025-01-11 PROCEDURE — 2500000004 HC RX 250 GENERAL PHARMACY W/ HCPCS (ALT 636 FOR OP/ED)

## 2025-01-11 PROCEDURE — 2500000002 HC RX 250 W HCPCS SELF ADMINISTERED DRUGS (ALT 637 FOR MEDICARE OP, ALT 636 FOR OP/ED)

## 2025-01-11 PROCEDURE — 82947 ASSAY GLUCOSE BLOOD QUANT: CPT

## 2025-01-11 PROCEDURE — 2500000001 HC RX 250 WO HCPCS SELF ADMINISTERED DRUGS (ALT 637 FOR MEDICARE OP): Performed by: STUDENT IN AN ORGANIZED HEALTH CARE EDUCATION/TRAINING PROGRAM

## 2025-01-11 PROCEDURE — 99024 POSTOP FOLLOW-UP VISIT: CPT

## 2025-01-11 RX ORDER — INSULIN GLARGINE 100 [IU]/ML
35 INJECTION, SOLUTION SUBCUTANEOUS EVERY MORNING
Qty: 10.5 ML | Refills: 1 | Status: SHIPPED | OUTPATIENT
Start: 2025-01-11 | End: 2025-03-12

## 2025-01-11 RX ORDER — ADHESIVE BANDAGE
30 BANDAGE TOPICAL ONCE
Status: DISCONTINUED | OUTPATIENT
Start: 2025-01-11 | End: 2025-01-11 | Stop reason: HOSPADM

## 2025-01-11 RX ORDER — INSULIN LISPRO 100 [IU]/ML
10 INJECTION, SOLUTION INTRAVENOUS; SUBCUTANEOUS
Qty: 9 ML | Refills: 1 | Status: SHIPPED | OUTPATIENT
Start: 2025-01-11 | End: 2025-03-12

## 2025-01-11 RX ORDER — LIDOCAINE 560 MG/1
1 PATCH PERCUTANEOUS; TOPICAL; TRANSDERMAL DAILY
Qty: 14 PATCH | Refills: 0 | Status: SHIPPED | OUTPATIENT
Start: 2025-01-11

## 2025-01-11 RX ORDER — TRAMADOL HYDROCHLORIDE 50 MG/1
50 TABLET ORAL EVERY 6 HOURS PRN
Qty: 12 TABLET | Refills: 0 | Status: SHIPPED | OUTPATIENT
Start: 2025-01-11

## 2025-01-11 RX ORDER — AMOXICILLIN 250 MG
2 CAPSULE ORAL 2 TIMES DAILY
Qty: 28 TABLET | Refills: 0 | Status: SHIPPED | OUTPATIENT
Start: 2025-01-11 | End: 2025-01-18

## 2025-01-11 RX ADMIN — INSULIN GLARGINE 35 UNITS: 100 INJECTION, SOLUTION SUBCUTANEOUS at 09:03

## 2025-01-11 RX ADMIN — LISINOPRIL 10 MG: 10 TABLET ORAL at 09:03

## 2025-01-11 RX ADMIN — DULOXETINE HYDROCHLORIDE 20 MG: 20 CAPSULE, DELAYED RELEASE ORAL at 09:03

## 2025-01-11 RX ADMIN — HEPARIN SODIUM 7500 UNITS: 5000 INJECTION INTRAVENOUS; SUBCUTANEOUS at 05:41

## 2025-01-11 RX ADMIN — INSULIN LISPRO 10 UNITS: 100 INJECTION, SOLUTION INTRAVENOUS; SUBCUTANEOUS at 09:30

## 2025-01-11 RX ADMIN — CARBIDOPA AND LEVODOPA 1 TABLET: 25; 100 TABLET ORAL at 09:03

## 2025-01-11 ASSESSMENT — COGNITIVE AND FUNCTIONAL STATUS - GENERAL
MOVING FROM LYING ON BACK TO SITTING ON SIDE OF FLAT BED WITH BEDRAILS: A LITTLE
MOVING TO AND FROM BED TO CHAIR: A LOT
CLIMB 3 TO 5 STEPS WITH RAILING: A LOT
TURNING FROM BACK TO SIDE WHILE IN FLAT BAD: A LITTLE
TOILETING: A LITTLE
DRESSING REGULAR UPPER BODY CLOTHING: A LITTLE
MOBILITY SCORE: 15
WALKING IN HOSPITAL ROOM: A LITTLE
STANDING UP FROM CHAIR USING ARMS: A LOT
DRESSING REGULAR LOWER BODY CLOTHING: A LITTLE
HELP NEEDED FOR BATHING: A LITTLE
PERSONAL GROOMING: A LITTLE
DAILY ACTIVITIY SCORE: 19

## 2025-01-11 ASSESSMENT — ACTIVITIES OF DAILY LIVING (ADL): LACK_OF_TRANSPORTATION: NO

## 2025-01-11 ASSESSMENT — PAIN SCALES - GENERAL: PAINLEVEL_OUTOF10: 0 - NO PAIN

## 2025-01-11 NOTE — PROGRESS NOTES
01/11/25 0900   Discharge Planning   Living Arrangements Spouse/significant other   Support Systems Spouse/significant other;Friends/neighbors   Assistance Needed PT/OT   Type of Residence Private residence   Home or Post Acute Services Post acute facilities (Rehab/SNF/etc)   Type of Post Acute Facility Services Skilled nursing  ([facility])   Expected Discharge Disposition SNF  ([facility])   Does the patient need discharge transport arranged? Yes   RoundTrip coordination needed? Yes   Has discharge transport been arranged? Yes   What day is the transport expected? 01/11/25   What time is the transport expected? 1100   Financial Resource Strain   How hard is it for you to pay for the very basics like food, housing, medical care, and heating? Not hard   Housing Stability   In the last 12 months, was there a time when you were not able to pay the mortgage or rent on time? N   In the past 12 months, how many times have you moved where you were living? 0   At any time in the past 12 months, were you homeless or living in a shelter (including now)? N   Transportation Needs   In the past 12 months, has lack of transportation kept you from medical appointments or from getting medications? no   In the past 12 months, has lack of transportation kept you from meetings, work, or from getting things needed for daily living? No     Pt will discharge today to [facility] at 1100 with [facility].  Number for report is [phone] and was sent to bedside nurse via secure chat.  [phone] submitted in HENS; facility notified.  SW will follow. Brian [facility]

## 2025-01-11 NOTE — CARE PLAN
Problem: Pain  Goal: Takes deep breaths with improved pain control throughout the shift  Outcome: Progressing  Goal: Turns in bed with improved pain control throughout the shift  Outcome: Progressing  Goal: Walks with improved pain control throughout the shift  Outcome: Progressing  Goal: Performs ADL's with improved pain control throughout shift  Outcome: Progressing  Goal: Participates in PT with improved pain control throughout the shift  Outcome: Progressing  Goal: Free from opioid side effects throughout the shift  Outcome: Progressing  Goal: Free from acute confusion related to pain meds throughout the shift  Outcome: Progressing     Problem: Skin  Goal: Decreased wound size/increased tissue granulation at next dressing change  Outcome: Progressing  Goal: Participates in plan/prevention/treatment measures  Outcome: Progressing  Goal: Prevent/manage excess moisture  Outcome: Progressing  Goal: Prevent/minimize sheer/friction injuries  Outcome: Progressing  Goal: Promote/optimize nutrition  Outcome: Progressing  Goal: Promote skin healing  Outcome: Progressing     Problem: Fall/Injury  Goal: Not fall by end of shift  Outcome: Progressing  Goal: Be free from injury by end of the shift  Outcome: Progressing  Goal: Verbalize understanding of personal risk factors for fall in the hospital  Outcome: Progressing  Goal: Verbalize understanding of risk factor reduction measures to prevent injury from fall in the home  Outcome: Progressing  Goal: Use assistive devices by end of the shift  Outcome: Progressing  Goal: Pace activities to prevent fatigue by end of the shift  Outcome: Progressing   The patient's goals for the shift include      The clinical goals for the shift include patient to have a large bowel movement, PRN suppository given.

## 2025-01-11 NOTE — CARE PLAN
The patient's goals for the shift include      The clinical goals for the shift include Pt will remain safe and free from any injuries till discharge    Patient is being discharged to SNF today.  at 11am. Patient denies pain. Currently up in the chair. Pt had a large BM and feels relieved.

## 2025-01-13 ENCOUNTER — NURSING HOME VISIT (OUTPATIENT)
Dept: POST ACUTE CARE | Facility: EXTERNAL LOCATION | Age: 75
End: 2025-01-13
Payer: MEDICARE

## 2025-01-13 ENCOUNTER — APPOINTMENT (OUTPATIENT)
Dept: PRIMARY CARE | Facility: CLINIC | Age: 75
End: 2025-01-13
Payer: MEDICARE

## 2025-01-13 DIAGNOSIS — R26.2 DIFFICULTY IN WALKING: Primary | ICD-10-CM

## 2025-01-13 DIAGNOSIS — F41.8 MIXED ANXIETY AND DEPRESSIVE DISORDER: ICD-10-CM

## 2025-01-13 DIAGNOSIS — G20.C PARKINSONISM, UNSPECIFIED PARKINSONISM TYPE (MULTI): ICD-10-CM

## 2025-01-13 DIAGNOSIS — C50.811 MALIGNANT NEOPLASM OF OVERLAPPING SITES OF RIGHT FEMALE BREAST, UNSPECIFIED ESTROGEN RECEPTOR STATUS: ICD-10-CM

## 2025-01-13 DIAGNOSIS — I48.91 ATRIAL FIBRILLATION, UNSPECIFIED TYPE (MULTI): ICD-10-CM

## 2025-01-13 DIAGNOSIS — I48.11 LONGSTANDING PERSISTENT ATRIAL FIBRILLATION (MULTI): ICD-10-CM

## 2025-01-13 DIAGNOSIS — E66.9 TYPE 2 DIABETES MELLITUS WITH OBESITY (MULTI): Primary | ICD-10-CM

## 2025-01-13 DIAGNOSIS — E78.00 PURE HYPERCHOLESTEROLEMIA: ICD-10-CM

## 2025-01-13 DIAGNOSIS — G20.A1 PARKINSON'S DISEASE, UNSPECIFIED WHETHER DYSKINESIA PRESENT, UNSPECIFIED WHETHER MANIFESTATIONS FLUCTUATE: ICD-10-CM

## 2025-01-13 DIAGNOSIS — N18.32 STAGE 3B CHRONIC KIDNEY DISEASE (MULTI): ICD-10-CM

## 2025-01-13 DIAGNOSIS — E11.69 TYPE 2 DIABETES MELLITUS WITH OBESITY (MULTI): Primary | ICD-10-CM

## 2025-01-13 DIAGNOSIS — E66.01 OBESITY, MORBID (MULTI): ICD-10-CM

## 2025-01-13 DIAGNOSIS — I10 BENIGN ESSENTIAL HTN: ICD-10-CM

## 2025-01-13 DIAGNOSIS — E66.9 TYPE 2 DIABETES MELLITUS WITH OBESITY (MULTI): ICD-10-CM

## 2025-01-13 DIAGNOSIS — E11.69 TYPE 2 DIABETES MELLITUS WITH OBESITY (MULTI): ICD-10-CM

## 2025-01-13 DIAGNOSIS — C77.3 SECONDARY AND UNSPECIFIED MALIGNANT NEOPLASM OF AXILLA AND UPPER LIMB LYMPH NODES (MULTI): ICD-10-CM

## 2025-01-13 DIAGNOSIS — I48.0 PAROXYSMAL ATRIAL FIBRILLATION (MULTI): ICD-10-CM

## 2025-01-13 DIAGNOSIS — Z90.710 STATUS POST HYSTERECTOMY: ICD-10-CM

## 2025-01-13 DIAGNOSIS — C54.1 ENDOMETRIAL CANCER (MULTI): ICD-10-CM

## 2025-01-13 DIAGNOSIS — E78.5 HYPERLIPIDEMIA, UNSPECIFIED HYPERLIPIDEMIA TYPE: ICD-10-CM

## 2025-01-13 PROCEDURE — 99306 1ST NF CARE HIGH MDM 50: CPT | Performed by: INTERNAL MEDICINE

## 2025-01-13 NOTE — LETTER
Patient: Dina Mullins  : 1950    Encounter Date: 2025    [facility]: [facility]  HISTORY AND PHYSICAL  NOTE      Dina Mullins is a 74 y.o. female that is being seen  today for patient to rehab after hospitalization..  Subjective  Patient is a 74-year-old female with history of hypertension, hyperlipidemia, atrial fibrillation, diabetes, obesity, difficulty in walking, history of breast cancer, endometrial cancer s/p hysterectomy who is being seen for admission for rehab after hospitalization.  Patient denies any significant pain.  Patient had few episodes of diarrhea this morning.  Denies any abdominal pain no blood in the stools.  Patient has history of atrial fibrillation and is on Xarelto.  Patient has diabetes and is on insulin and sliding scale.  Patient is being admitted for rehab.      ROS  Negative for fever or chills  Negative for sore throat, ear pain, nasal discharge  Negative for cough, shortness of breath or wheezing  Negative for chest pain, palpitations, swelling of legs  Negative for abdominal pain, had an episode of diarrhea.  No blood in the stools  Negative for urinary complaints  Negative for headache, dizziness, weakness or numbness  Negative for joint pain, positive for difficulty walking.  Negative for depression or anxiety  All other systems reviewed and were negative   Vitals:    25 1327   BP: 132/70   Pulse: 72   Temp: 37 °C (98.6 °F)      There were no vitals filed for this visit.  There is no height or weight on file to calculate BMI.  Physical Exam  Constitutional: Patient does not appear to be in any acute distress  Head and Face: NCAT  Eyes: Normal external exam, EOMI  ENT: Normal external inspection of ears and nose. Oropharynx normal.  Cardiovascular: RRR, S1/S2, no murmurs, rubs, or gallops, radial pulses +2, no edema of extremities  Pulmonary: CTAB, no respiratory distress.  Abdomen: Surgical wound present.  No significant  distention.  Bowel sounds are present.    MSK: Patient does have difficulty in walking due to arthritis and uses cane..  Skin- No lesions, contusions, or erythema.  Peripheral puslses palpable bilaterally 2+  Neuro: AAO X3, Cranial nerves 2-12 grossly intact,DTR 2+ in all 4 limbs       LABS   [unfilled]  Lab Results   Component Value Date    GLUCOSE 138 (H) 12/27/2024    CALCIUM 9.0 12/27/2024     12/27/2024    K 4.0 12/27/2024    CO2 20 (L) 12/27/2024     (H) 12/27/2024    BUN 23 12/27/2024    CREATININE 1.30 (H) 12/27/2024     Lab Results   Component Value Date    ALT 7 09/13/2024    AST 11 09/13/2024    ALKPHOS 55 09/13/2024    BILITOT 0.9 09/13/2024     Lab Results   Component Value Date    WBC 5.4 12/27/2024    HGB 10.8 (L) 12/27/2024    HCT 34.0 (L) 12/27/2024    MCV 95 12/27/2024     12/27/2024     Lab Results   Component Value Date    CHOL 151 09/13/2024    CHOL 174 08/28/2023    CHOL 210 (H) 04/12/2022     Lab Results   Component Value Date    HDL 73.4 09/13/2024    HDL 74.5 08/28/2023    HDL 67 04/12/2022     Lab Results   Component Value Date    LDLCALC 64 09/13/2024    LDLCALC 129 04/12/2022    LDLCALC 119 02/19/2021     Lab Results   Component Value Date    TRIG 67 09/13/2024    TRIG 62 08/28/2023    TRIG 70 04/12/2022     Lab Results   Component Value Date    HGBA1C 5.8 08/12/2024     Other labs not included in the list above were reviewed either before or during this encounter.    History   Past Medical History:   Diagnosis Date   • Anemia    • Anxiety    • Arrhythmia     PAfib   • Arthritis    • Atrial fib/flutter, transient (Multi)    • Bilateral tinnitus    • Breast cancer (Multi)    • Cataract    • Chronic kidney failure     stage three   • CKD (chronic kidney disease)     3b   • Depression    • DM (diabetes mellitus) (Multi)    • Endometrial cancer (Multi)    • Hyperlipidemia    • Hypertension    • Left arm pain    • Obesity    • Parkinson disease (Multi)    • Post-menopausal  bleeding    • Sepsis (Multi)    • Septic shock (Multi) 04/2019   • Type 2 diabetes mellitus    • Uterine cancer (Multi)    • Vertigo    • Vitamin D deficiency      Past Surgical History:   Procedure Laterality Date   • BIOPSY  2024    uterine   • BREAST BIOPSY Right 07/01/2024    IDC   • BREAST LUMPECTOMY Right 08/07/2024   • BREAST LUMPECTOMY Right    • LYMPH NODE DISSECTION Right 08/07/2024     Family History   Problem Relation Name Age of Onset   • Cancer Mother     • Breast cancer Mother  69   • Alzheimer's disease Father       Allergies   Allergen Reactions   • Codeine Nausea/vomiting     Current Outpatient Medications on File Prior to Visit   Medication Sig Dispense Refill   • acetaminophen (Tylenol 8 HOUR) 650 mg ER tablet Take 2 tablets (1,300 mg) by mouth 2 times a day.     • atorvastatin (Lipitor) 40 mg tablet Take 1 tablet (40 mg) by mouth once daily at bedtime. (Patient taking differently: Take 1 tablet (40 mg) by mouth once daily at bedtime. Takes #1 tablet 4 times a week) 90 tablet 1   • carbidopa-levodopa (Sinemet)  mg tablet Take 1 tablet by mouth 3 times a day. 90 tablet 5   • furosemide (Lasix) 40 mg tablet Take 1 tablet (40 mg) by mouth once daily. (Patient taking differently: Take 1 tablet (40 mg) by mouth once daily. Takes #1 tablet as needed) 90 tablet 1   • insulin lispro 100 unit/mL injection Inject 10 Units under the skin 3 times a day before meals. Take as directed per insulin instructions. 9 mL 1   • Lantus Solostar U-100 Insulin 100 unit/mL (3 mL) pen Inject 35 Units under the skin once daily in the morning. 10.5 mL 1   • lidocaine 4 % patch Place 1 patch over 12 hours on the skin once daily. Remove & discard patch within 12 hours or as directed by MD. 14 patch 0   • lisinopril 10 mg tablet TAKE ONE TABLET BY MOUTH ONCE A DAY 90 tablet 0   • meclizine (Antivert) 25 mg tablet Take 1 tablet (25 mg) by mouth 3 times a day as needed for dizziness. (Patient taking differently: Take 1  "tablet (25 mg) by mouth 3 times a day as needed for dizziness. Takes #1 tablet every morning and as needed for the rest of the day) 90 tablet 2   • megestrol (Megace) 40 mg tablet Take 1 tablet (40 mg total) by mouth 2 times a day. 60 tablet 1   • OneTouch Verio test strips strip 1 strip by in vitro route 4 times a day. And as needed 300 strip 3   • pen needle, diabetic 32 gauge x 5/32\" needle USE AS DIRECTED 100 each 3   • rivaroxaban (Xarelto) 20 mg tablet Take 1 tablet (20 mg) by mouth once daily in the evening. Take with meals. 90 tablet 3   • [] sennosides-docusate sodium (Dionna-Colace) 8.6-50 mg tablet Take 2 tablets by mouth 2 times a day for 7 days. While taking Tramadol then as needed for stool softener. 28 tablet 0   • simethicone (Mylicon) 80 mg chewable tablet Chew 1 tablet (80 mg) 4 times a day as needed (gas pain).     • traMADol (Ultram) 50 mg tablet Take 1 tablet (50 mg) by mouth every 6 hours if needed for moderate pain (4 - 6) or severe pain (7 - 10). 12 tablet 0     No current facility-administered medications on file prior to visit.     Immunization History   Administered Date(s) Administered   • COVID-19, mRNA, LNP-S, PF, 30 mcg/0.3 mL dose 2021, 2021   • Flu vaccine (IIV4), preservative free *Check age/dose* 2021   • Flu vaccine, quadrivalent, high-dose, preservative free, age 65y+ (FLUZONE) 2020, 2023   • Flu vaccine, trivalent, preservative free, HIGH-DOSE, age 65y+ (Fluzone) 10/07/2015, 2016, 2018, 10/22/2019   • Influenza, injectable, quadrivalent 2023   • Influenza, seasonal, injectable 2014   • Pfizer COVID-19 vaccine, 12 years and older, (30mcg/0.3mL) (Comirnaty) 2023   • Pfizer Purple Cap SARS-CoV-2 2021   • Pneumococcal conjugate vaccine, 13-valent (PREVNAR 13) 2015   • Pneumococcal polysaccharide vaccine, 23-valent, age 2 years and older (PNEUMOVAX 23) 2014, 2020   • Tdap vaccine, age 7 year and " older (BOOSTRIX, ADACEL) 09/30/2014   • Zoster, live 09/30/2014     Patient's medical history was reviewed and updated either before or during this encounter.  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  Patient was admitted to the hospital for hysterectomy for endometrial cancer.  The patient will be getting physical therapy and Occupational Therapy.  Patient has diabetes and is on insulin.  Patient will be on Accu-Cheks.  Patient's blood pressure has been fairly controlled with current medications.  Patient is on pain medication.  Patient also has atrial fibrillation and is on Xarelto for anticoagulation.        Tahir Jones MD       Electronically Signed By: Tahir Jones MD   1/20/25  8:29 AM

## 2025-01-16 ENCOUNTER — TELEPHONE (OUTPATIENT)
Dept: GYNECOLOGIC ONCOLOGY | Facility: HOSPITAL | Age: 75
End: 2025-01-16
Payer: MEDICARE

## 2025-01-16 ENCOUNTER — TELEPHONE (OUTPATIENT)
Dept: SURGICAL ONCOLOGY | Facility: CLINIC | Age: 75
End: 2025-01-16
Payer: MEDICARE

## 2025-01-16 NOTE — TELEPHONE ENCOUNTER
Number states that it is no longer in service. Tried alternative number- VM unable to be left at mailbox is full. Was calling in response to admin assist request. Pt called previously wanting to see Dr Crenshaw. Will send INBEP message as well.

## 2025-01-16 NOTE — TELEPHONE ENCOUNTER
Patient called requesting clarification on postop instruction, activity.   Patient states she is currently in rehab following 1/8/25 TLH/BSO.     Reviewed restrictions of no heavy lifting, pushing/pulling > 10lbs.   Encouraged patient to walk, get out of bed with assist.   Patient states incisions are healing well, without s/sx's infection, denies bowel/bladder issues. Updated patient with Dr. Black appointment on 2/6/25 at [facility] [place] location.   Patient verbalized her understanding of information given.

## 2025-01-17 DIAGNOSIS — C50.811 MALIGNANT NEOPLASM OF OVERLAPPING SITES OF RIGHT FEMALE BREAST, UNSPECIFIED ESTROGEN RECEPTOR STATUS: ICD-10-CM

## 2025-01-17 LAB
LAB AP BLOCK FOR ADDITIONAL STUDIES: NORMAL
LABORATORY COMMENT REPORT: NORMAL
PATH REPORT.COMMENTS IMP SPEC: NORMAL
PATH REPORT.FINAL DX SPEC: NORMAL
PATH REPORT.GROSS SPEC: NORMAL
PATH REPORT.RELEVANT HX SPEC: NORMAL
PATH REPORT.TOTAL CANCER: NORMAL
PATHOLOGY SYNOPTIC REPORT: NORMAL

## 2025-01-20 ENCOUNTER — NURSING HOME VISIT (OUTPATIENT)
Dept: POST ACUTE CARE | Facility: EXTERNAL LOCATION | Age: 75
End: 2025-01-20
Payer: MEDICARE

## 2025-01-20 VITALS — DIASTOLIC BLOOD PRESSURE: 70 MMHG | SYSTOLIC BLOOD PRESSURE: 132 MMHG | TEMPERATURE: 98.6 F | HEART RATE: 72 BPM

## 2025-01-20 DIAGNOSIS — I48.91 ATRIAL FIBRILLATION, UNSPECIFIED TYPE (MULTI): Primary | ICD-10-CM

## 2025-01-20 DIAGNOSIS — U07.1 COVID-19: ICD-10-CM

## 2025-01-20 DIAGNOSIS — I10 BENIGN ESSENTIAL HTN: ICD-10-CM

## 2025-01-20 DIAGNOSIS — E66.9 TYPE 2 DIABETES MELLITUS WITH OBESITY (MULTI): ICD-10-CM

## 2025-01-20 DIAGNOSIS — N18.32 STAGE 3B CHRONIC KIDNEY DISEASE (MULTI): ICD-10-CM

## 2025-01-20 DIAGNOSIS — E66.01 OBESITY, MORBID (MULTI): ICD-10-CM

## 2025-01-20 DIAGNOSIS — E11.69 TYPE 2 DIABETES MELLITUS WITH OBESITY (MULTI): ICD-10-CM

## 2025-01-20 DIAGNOSIS — R26.2 DIFFICULTY IN WALKING: ICD-10-CM

## 2025-01-20 DIAGNOSIS — C54.1 ENDOMETRIAL CANCER (MULTI): ICD-10-CM

## 2025-01-20 PROBLEM — C77.3 SECONDARY AND UNSPECIFIED MALIGNANT NEOPLASM OF AXILLA AND UPPER LIMB LYMPH NODES (MULTI): Status: ACTIVE | Noted: 2025-01-20

## 2025-01-20 PROCEDURE — 99310 SBSQ NF CARE HIGH MDM 45: CPT | Performed by: INTERNAL MEDICINE

## 2025-01-20 NOTE — DOCUMENTATION CLARIFICATION NOTE
"    PATIENT:               ELISSA CABEZAS  ACCT #:                  [id number]  MRN:                       [id number]  :                       1950  ADMIT DATE:       2025 10:49 AM  DISCH DATE:        2025 11:19 AM  RESPONDING PROVIDER #:        [id number]          PROVIDER RESPONSE TEXT:    Morbid obesity with BMI likely greater than 45    CDI QUERY TEXT:    Clarification    Instruction:    Based on your assessment of the patient and the clinical information, please provide the requested documentation by clicking on the appropriate radio button and enter any additional information if prompted.    Question: Please further clarify this patient's morbid obesity    When answering this query, please exercise your independent professional judgment. The fact that a question is being asked, does not imply that any particular answer is desired or expected.    The patient's clinical indicators include:  Clinical Information:  25 Discharge Summary:  \"Elissa is a 73 yo with grade 2 Endometrial adenocarcinoma and concurrent breast CA. Patient was admitted on  for observation after her RA-TLH, BSO, SLND.\"    Clinical Indicators:  As of 24: BMI - 45.18, Weight - 112 kg, Height - 5 foot 2 inches  No weight/height recorded on 25    Treatment: Monitor vital signs/labs    Risk Factors: Endometrial cancer and concurrent breast cancer; no tobacco/alcohol/drug use per the 25 H&P  Options provided:  -- Morbid obesity with BMI likely greater than 45  -- Morbid obesity with BMI likely greater than 40  -- Morbid obesity with BMI likely less than 40  -- Other - I will add my own diagnosis  -- Refer to Clinical Documentation Reviewer    Query created by: Melly Valdez on 2025 11:37 AM      Electronically signed by:  DANA ORR MD 2025 7:07 AM          "

## 2025-01-20 NOTE — PROGRESS NOTES
[facility]: [facility]  HISTORY AND PHYSICAL  NOTE      Dina Mullins is a 74 y.o. female that is being seen  today for patient to rehab after hospitalization..  Subjective   Patient is a 74-year-old female with history of hypertension, hyperlipidemia, atrial fibrillation, diabetes, obesity, difficulty in walking, history of breast cancer, endometrial cancer s/p hysterectomy who is being seen for admission for rehab after hospitalization.  Patient denies any significant pain.  Patient had few episodes of diarrhea this morning.  Denies any abdominal pain no blood in the stools.  Patient has history of atrial fibrillation and is on Xarelto.  Patient has diabetes and is on insulin and sliding scale.  Patient is being admitted for rehab.      ROS  Negative for fever or chills  Negative for sore throat, ear pain, nasal discharge  Negative for cough, shortness of breath or wheezing  Negative for chest pain, palpitations, swelling of legs  Negative for abdominal pain, had an episode of diarrhea.  No blood in the stools  Negative for urinary complaints  Negative for headache, dizziness, weakness or numbness  Negative for joint pain, positive for difficulty walking.  Negative for depression or anxiety  All other systems reviewed and were negative   Vitals:    01/13/25 1327   BP: 132/70   Pulse: 72   Temp: 37 °C (98.6 °F)      There were no vitals filed for this visit.  There is no height or weight on file to calculate BMI.  Physical Exam  Constitutional: Patient does not appear to be in any acute distress  Head and Face: NCAT  Eyes: Normal external exam, EOMI  ENT: Normal external inspection of ears and nose. Oropharynx normal.  Cardiovascular: RRR, S1/S2, no murmurs, rubs, or gallops, radial pulses +2, no edema of extremities  Pulmonary: CTAB, no respiratory distress.  Abdomen: Surgical wound present.  No significant distention.  Bowel sounds are present.    MSK: Patient does have difficulty  in walking due to arthritis and uses cane..  Skin- No lesions, contusions, or erythema.  Peripheral puslses palpable bilaterally 2+  Neuro: AAO X3, Cranial nerves 2-12 grossly intact,DTR 2+ in all 4 limbs       LABS   [unfilled]  Lab Results   Component Value Date    GLUCOSE 138 (H) 12/27/2024    CALCIUM 9.0 12/27/2024     12/27/2024    K 4.0 12/27/2024    CO2 20 (L) 12/27/2024     (H) 12/27/2024    BUN 23 12/27/2024    CREATININE 1.30 (H) 12/27/2024     Lab Results   Component Value Date    ALT 7 09/13/2024    AST 11 09/13/2024    ALKPHOS 55 09/13/2024    BILITOT 0.9 09/13/2024     Lab Results   Component Value Date    WBC 5.4 12/27/2024    HGB 10.8 (L) 12/27/2024    HCT 34.0 (L) 12/27/2024    MCV 95 12/27/2024     12/27/2024     Lab Results   Component Value Date    CHOL 151 09/13/2024    CHOL 174 08/28/2023    CHOL 210 (H) 04/12/2022     Lab Results   Component Value Date    HDL 73.4 09/13/2024    HDL 74.5 08/28/2023    HDL 67 04/12/2022     Lab Results   Component Value Date    LDLCALC 64 09/13/2024    LDLCALC 129 04/12/2022    LDLCALC 119 02/19/2021     Lab Results   Component Value Date    TRIG 67 09/13/2024    TRIG 62 08/28/2023    TRIG 70 04/12/2022     Lab Results   Component Value Date    HGBA1C 5.8 08/12/2024     Other labs not included in the list above were reviewed either before or during this encounter.    History    Past Medical History:   Diagnosis Date    Anemia     Anxiety     Arrhythmia     PAfib    Arthritis     Atrial fib/flutter, transient (Multi)     Bilateral tinnitus     Breast cancer (Multi)     Cataract     Chronic kidney failure     stage three    CKD (chronic kidney disease)     3b    Depression     DM (diabetes mellitus) (Multi)     Endometrial cancer (Multi)     Hyperlipidemia     Hypertension     Left arm pain     Obesity     Parkinson disease (Multi)     Post-menopausal bleeding     Sepsis (Multi)     Septic shock (Multi) 04/2019    Type 2 diabetes mellitus      Uterine cancer (Multi)     Vertigo     Vitamin D deficiency      Past Surgical History:   Procedure Laterality Date    BIOPSY  2024    uterine    BREAST BIOPSY Right 07/01/2024    IDC    BREAST LUMPECTOMY Right 08/07/2024    BREAST LUMPECTOMY Right     LYMPH NODE DISSECTION Right 08/07/2024     Family History   Problem Relation Name Age of Onset    Cancer Mother      Breast cancer Mother  69    Alzheimer's disease Father       Allergies   Allergen Reactions    Codeine Nausea/vomiting     Current Outpatient Medications on File Prior to Visit   Medication Sig Dispense Refill    acetaminophen (Tylenol 8 HOUR) 650 mg ER tablet Take 2 tablets (1,300 mg) by mouth 2 times a day.      atorvastatin (Lipitor) 40 mg tablet Take 1 tablet (40 mg) by mouth once daily at bedtime. (Patient taking differently: Take 1 tablet (40 mg) by mouth once daily at bedtime. Takes #1 tablet 4 times a week) 90 tablet 1    carbidopa-levodopa (Sinemet)  mg tablet Take 1 tablet by mouth 3 times a day. 90 tablet 5    furosemide (Lasix) 40 mg tablet Take 1 tablet (40 mg) by mouth once daily. (Patient taking differently: Take 1 tablet (40 mg) by mouth once daily. Takes #1 tablet as needed) 90 tablet 1    insulin lispro 100 unit/mL injection Inject 10 Units under the skin 3 times a day before meals. Take as directed per insulin instructions. 9 mL 1    Lantus Solostar U-100 Insulin 100 unit/mL (3 mL) pen Inject 35 Units under the skin once daily in the morning. 10.5 mL 1    lidocaine 4 % patch Place 1 patch over 12 hours on the skin once daily. Remove & discard patch within 12 hours or as directed by MD. 14 patch 0    lisinopril 10 mg tablet TAKE ONE TABLET BY MOUTH ONCE A DAY 90 tablet 0    meclizine (Antivert) 25 mg tablet Take 1 tablet (25 mg) by mouth 3 times a day as needed for dizziness. (Patient taking differently: Take 1 tablet (25 mg) by mouth 3 times a day as needed for dizziness. Takes #1 tablet every morning and as needed for the rest  "of the day) 90 tablet 2    megestrol (Megace) 40 mg tablet Take 1 tablet (40 mg total) by mouth 2 times a day. 60 tablet 1    OneTouch Verio test strips strip 1 strip by in vitro route 4 times a day. And as needed 300 strip 3    pen needle, diabetic 32 gauge x \" needle USE AS DIRECTED 100 each 3    rivaroxaban (Xarelto) 20 mg tablet Take 1 tablet (20 mg) by mouth once daily in the evening. Take with meals. 90 tablet 3    [] sennosides-docusate sodium (Dionna-Colace) 8.6-50 mg tablet Take 2 tablets by mouth 2 times a day for 7 days. While taking Tramadol then as needed for stool softener. 28 tablet 0    simethicone (Mylicon) 80 mg chewable tablet Chew 1 tablet (80 mg) 4 times a day as needed (gas pain).      traMADol (Ultram) 50 mg tablet Take 1 tablet (50 mg) by mouth every 6 hours if needed for moderate pain (4 - 6) or severe pain (7 - 10). 12 tablet 0     No current facility-administered medications on file prior to visit.     Immunization History   Administered Date(s) Administered    COVID-19, mRNA, LNP-S, PF, 30 mcg/0.3 mL dose 2021, 2021    Flu vaccine (IIV4), preservative free *Check age/dose* 2021    Flu vaccine, quadrivalent, high-dose, preservative free, age 65y+ (FLUZONE) 2020, 2023    Flu vaccine, trivalent, preservative free, HIGH-DOSE, age 65y+ (Fluzone) 10/07/2015, 2016, 2018, 10/22/2019    Influenza, injectable, quadrivalent 2023    Influenza, seasonal, injectable 2014    Pfizer COVID-19 vaccine, 12 years and older, (30mcg/0.3mL) (Comirnaty) 2023    Pfizer Purple Cap SARS-CoV-2 2021    Pneumococcal conjugate vaccine, 13-valent (PREVNAR 13) 2015    Pneumococcal polysaccharide vaccine, 23-valent, age 2 years and older (PNEUMOVAX 23) 2014, 2020    Tdap vaccine, age 7 year and older (BOOSTRIX, ADACEL) 2014    Zoster, live 2014     Patient's medical history was reviewed and updated either before or " during this encounter.  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  Patient was admitted to the hospital for hysterectomy for endometrial cancer.  The patient will be getting physical therapy and Occupational Therapy.  Patient has diabetes and is on insulin.  Patient will be on Accu-Cheks.  Patient's blood pressure has been fairly controlled with current medications.  Patient is on pain medication.  Patient also has atrial fibrillation and is on Xarelto for anticoagulation.        Tahir Jones MD

## 2025-01-20 NOTE — LETTER
Patient: Dina Mullins  : 1950    Encounter Date: 2025    [facility]: [facility]  PROGRESS  NOTE      Dina Mullins is a 74 y.o. female that is being seen  today for follow up at rehab.  Subjective  Patient is a 74-year-old female with history of hypertension, hyperlipidemia, atrial fibrillation, diabetes, obesity, difficulty in walking, history of breast cancer, endometrial cancer s/p hysterectomy who is being seen for follow up at  rehab   Patient has been diagnosed positive for COVID.  Patient has some runny nose and mild cough.  No fever no chills no shortness of breath.  Patient is is going to be in isolation.    Patient has history of atrial fibrillation and is on Xarelto.  Patient has diabetes and is on insulin and sliding scale.  Patient is being admitted for rehab.      ROS  Negative for fever or chills  Positive for nasal discharge and mild cough.  Negative for cough, shortness of breath or wheezing  Negative for chest pain, palpitations, swelling of legs  Negative for abdominal pain, had an episode of diarrhea.  No blood in the stools  Negative for urinary complaints  Negative for headache, dizziness, weakness or numbness  Negative for joint pain, positive for difficulty walking.  Negative for depression or anxiety  All other systems reviewed and were negative   Vitals:    25 1320   BP: 134/70   Pulse: 70   Temp: 37 °C (98.6 °F)      There were no vitals filed for this visit.  There is no height or weight on file to calculate BMI.  Physical Exam  Constitutional: Patient does not appear to be in any acute distress  Head and Face: NCAT  Eyes: Normal external exam, EOMI  ENT: Normal external inspection of ears and nose. Oropharynx normal.  Cardiovascular: RRR, S1/S2, no murmurs, rubs, or gallops, radial pulses +2, no edema of extremities  Pulmonary: CTAB, no respiratory distress.  Abdomen: Surgical wound present.  No significant distention.  Bowel  sounds are present.    MSK: Patient does have difficulty in walking due to arthritis and uses cane..  Skin- No lesions, contusions, or erythema.  Peripheral puslses palpable bilaterally 2+  Neuro: AAO X3, Cranial nerves 2-12 grossly intact,DTR 2+ in all 4 limbs       LABS   [unfilled]  Lab Results   Component Value Date    GLUCOSE 138 (H) 12/27/2024    CALCIUM 9.0 12/27/2024     12/27/2024    K 4.0 12/27/2024    CO2 20 (L) 12/27/2024     (H) 12/27/2024    BUN 23 12/27/2024    CREATININE 1.30 (H) 12/27/2024     Lab Results   Component Value Date    ALT 7 09/13/2024    AST 11 09/13/2024    ALKPHOS 55 09/13/2024    BILITOT 0.9 09/13/2024     Lab Results   Component Value Date    WBC 5.4 12/27/2024    HGB 10.8 (L) 12/27/2024    HCT 34.0 (L) 12/27/2024    MCV 95 12/27/2024     12/27/2024     Lab Results   Component Value Date    CHOL 151 09/13/2024    CHOL 174 08/28/2023    CHOL 210 (H) 04/12/2022     Lab Results   Component Value Date    HDL 73.4 09/13/2024    HDL 74.5 08/28/2023    HDL 67 04/12/2022     Lab Results   Component Value Date    LDLCALC 64 09/13/2024    LDLCALC 129 04/12/2022    LDLCALC 119 02/19/2021     Lab Results   Component Value Date    TRIG 67 09/13/2024    TRIG 62 08/28/2023    TRIG 70 04/12/2022     Lab Results   Component Value Date    HGBA1C 5.8 08/12/2024     Other labs not included in the list above were reviewed either before or during this encounter.    History   Past Medical History:   Diagnosis Date   • Anemia    • Anxiety    • Arrhythmia     PAfib   • Arthritis    • Atrial fib/flutter, transient (Multi)    • Bilateral tinnitus    • Breast cancer (Multi)    • Cataract    • Chronic kidney failure     stage three   • CKD (chronic kidney disease)     3b   • Depression    • DM (diabetes mellitus) (Multi)    • Endometrial cancer (Multi)    • Hyperlipidemia    • Hypertension    • Left arm pain    • Obesity    • Parkinson disease (Multi)    • Post-menopausal bleeding    • Sepsis  (Multi)    • Septic shock (Multi) 04/2019   • Type 2 diabetes mellitus    • Uterine cancer (Multi)    • Vertigo    • Vitamin D deficiency      Past Surgical History:   Procedure Laterality Date   • BIOPSY  2024    uterine   • BREAST BIOPSY Right 07/01/2024    IDC   • BREAST LUMPECTOMY Right 08/07/2024   • BREAST LUMPECTOMY Right    • LYMPH NODE DISSECTION Right 08/07/2024     Family History   Problem Relation Name Age of Onset   • Cancer Mother     • Breast cancer Mother  69   • Alzheimer's disease Father       Allergies   Allergen Reactions   • Codeine Nausea/vomiting     Current Outpatient Medications on File Prior to Visit   Medication Sig Dispense Refill   • acetaminophen (Tylenol 8 HOUR) 650 mg ER tablet Take 2 tablets (1,300 mg) by mouth 2 times a day.     • atorvastatin (Lipitor) 40 mg tablet Take 1 tablet (40 mg) by mouth once daily at bedtime. (Patient taking differently: Take 1 tablet (40 mg) by mouth once daily at bedtime. Takes #1 tablet 4 times a week) 90 tablet 1   • carbidopa-levodopa (Sinemet)  mg tablet Take 1 tablet by mouth 3 times a day. 90 tablet 5   • furosemide (Lasix) 40 mg tablet Take 1 tablet (40 mg) by mouth once daily. (Patient taking differently: Take 1 tablet (40 mg) by mouth once daily. Takes #1 tablet as needed) 90 tablet 1   • insulin lispro 100 unit/mL injection Inject 10 Units under the skin 3 times a day before meals. Take as directed per insulin instructions. 9 mL 1   • Lantus Solostar U-100 Insulin 100 unit/mL (3 mL) pen Inject 35 Units under the skin once daily in the morning. 10.5 mL 1   • lidocaine 4 % patch Place 1 patch over 12 hours on the skin once daily. Remove & discard patch within 12 hours or as directed by MD. 14 patch 0   • lisinopril 10 mg tablet TAKE ONE TABLET BY MOUTH ONCE A DAY 90 tablet 0   • meclizine (Antivert) 25 mg tablet Take 1 tablet (25 mg) by mouth 3 times a day as needed for dizziness. (Patient taking differently: Take 1 tablet (25 mg) by mouth  "3 times a day as needed for dizziness. Takes #1 tablet every morning and as needed for the rest of the day) 90 tablet 2   • megestrol (Megace) 40 mg tablet Take 1 tablet (40 mg total) by mouth 2 times a day. 60 tablet 1   • OneTouch Verio test strips strip 1 strip by in vitro route 4 times a day. And as needed 300 strip 3   • pen needle, diabetic 32 gauge x 5/32\" needle USE AS DIRECTED 100 each 3   • rivaroxaban (Xarelto) 20 mg tablet Take 1 tablet (20 mg) by mouth once daily in the evening. Take with meals. 90 tablet 3   • [] sennosides-docusate sodium (Dionna-Colace) 8.6-50 mg tablet Take 2 tablets by mouth 2 times a day for 7 days. While taking Tramadol then as needed for stool softener. 28 tablet 0   • simethicone (Mylicon) 80 mg chewable tablet Chew 1 tablet (80 mg) 4 times a day as needed (gas pain).     • traMADol (Ultram) 50 mg tablet Take 1 tablet (50 mg) by mouth every 6 hours if needed for moderate pain (4 - 6) or severe pain (7 - 10). 12 tablet 0     No current facility-administered medications on file prior to visit.     Immunization History   Administered Date(s) Administered   • COVID-19, mRNA, LNP-S, PF, 30 mcg/0.3 mL dose 2021, 2021   • Flu vaccine (IIV4), preservative free *Check age/dose* 2021   • Flu vaccine, quadrivalent, high-dose, preservative free, age 65y+ (FLUZONE) 2020, 2023   • Flu vaccine, trivalent, preservative free, HIGH-DOSE, age 65y+ (Fluzone) 10/07/2015, 2016, 2018, 10/22/2019   • Influenza, injectable, quadrivalent 2023   • Influenza, seasonal, injectable 2014   • Pfizer COVID-19 vaccine, 12 years and older, (30mcg/0.3mL) (Comirnaty) 2023   • Pfizer Purple Cap SARS-CoV-2 2021   • Pneumococcal conjugate vaccine, 13-valent (PREVNAR 13) 2015   • Pneumococcal polysaccharide vaccine, 23-valent, age 2 years and older (PNEUMOVAX 23) 2014, 2020   • Tdap vaccine, age 7 year and older (BOOSTRIX, ADACEL) " 09/30/2014   • Zoster, live 09/30/2014     Patient's medical history was reviewed and updated either before or during this encounter.  ASSESSMENT / PLAN:  Diagnoses and all orders for this visit:  Atrial fibrillation, unspecified type (Multi) (Primary)  Type 2 diabetes mellitus with obesity (Multi)  Endometrial cancer (Multi)  Difficulty in walking  Benign essential HTN  Stage 3b chronic kidney disease (Multi)  Obesity, morbid (Multi)  COVID-19       Patient is being seen for admission for rehab after hospitalization.  Patient was admitted to the hospital for hysterectomy for endometrial cancer.  The patient is getting physical therapy and Occupational Therapy.  Patient has diabetes and is on insulin.  Patient on Accu-Cheks.  Patient's blood pressure has been fairly controlled with current medications.  Patient is on pain medication.  Patient also has atrial fibrillation and is on Xarelto for anticoagulation.  Patient has been diagnosed positive for COVID.  Patient will be in isolation.  No respiratory symptoms.      Tahir Jones MD       Electronically Signed By: Tahir Jones MD   1/23/25  1:55 PM

## 2025-01-22 ENCOUNTER — APPOINTMENT (OUTPATIENT)
Dept: ORTHOPEDIC SURGERY | Facility: CLINIC | Age: 75
End: 2025-01-22
Payer: MEDICARE

## 2025-01-23 VITALS — SYSTOLIC BLOOD PRESSURE: 134 MMHG | DIASTOLIC BLOOD PRESSURE: 70 MMHG | TEMPERATURE: 98.6 F | HEART RATE: 70 BPM

## 2025-01-23 NOTE — PROGRESS NOTES
[facility]: [facility]  PROGRESS  NOTE      Dina Mullins is a 74 y.o. female that is being seen  today for follow up at rehab.  Subjective   Patient is a 74-year-old female with history of hypertension, hyperlipidemia, atrial fibrillation, diabetes, obesity, difficulty in walking, history of breast cancer, endometrial cancer s/p hysterectomy who is being seen for follow up at  rehab   Patient has been diagnosed positive for COVID.  Patient has some runny nose and mild cough.  No fever no chills no shortness of breath.  Patient is is going to be in isolation.    Patient has history of atrial fibrillation and is on Xarelto.  Patient has diabetes and is on insulin and sliding scale.  Patient is being admitted for rehab.      ROS  Negative for fever or chills  Positive for nasal discharge and mild cough.  Negative for cough, shortness of breath or wheezing  Negative for chest pain, palpitations, swelling of legs  Negative for abdominal pain, had an episode of diarrhea.  No blood in the stools  Negative for urinary complaints  Negative for headache, dizziness, weakness or numbness  Negative for joint pain, positive for difficulty walking.  Negative for depression or anxiety  All other systems reviewed and were negative   Vitals:    01/20/25 1320   BP: 134/70   Pulse: 70   Temp: 37 °C (98.6 °F)      There were no vitals filed for this visit.  There is no height or weight on file to calculate BMI.  Physical Exam  Constitutional: Patient does not appear to be in any acute distress  Head and Face: NCAT  Eyes: Normal external exam, EOMI  ENT: Normal external inspection of ears and nose. Oropharynx normal.  Cardiovascular: RRR, S1/S2, no murmurs, rubs, or gallops, radial pulses +2, no edema of extremities  Pulmonary: CTAB, no respiratory distress.  Abdomen: Surgical wound present.  No significant distention.  Bowel sounds are present.    MSK: Patient does have difficulty in walking due to  arthritis and uses cane..  Skin- No lesions, contusions, or erythema.  Peripheral puslses palpable bilaterally 2+  Neuro: AAO X3, Cranial nerves 2-12 grossly intact,DTR 2+ in all 4 limbs       LABS   [unfilled]  Lab Results   Component Value Date    GLUCOSE 138 (H) 12/27/2024    CALCIUM 9.0 12/27/2024     12/27/2024    K 4.0 12/27/2024    CO2 20 (L) 12/27/2024     (H) 12/27/2024    BUN 23 12/27/2024    CREATININE 1.30 (H) 12/27/2024     Lab Results   Component Value Date    ALT 7 09/13/2024    AST 11 09/13/2024    ALKPHOS 55 09/13/2024    BILITOT 0.9 09/13/2024     Lab Results   Component Value Date    WBC 5.4 12/27/2024    HGB 10.8 (L) 12/27/2024    HCT 34.0 (L) 12/27/2024    MCV 95 12/27/2024     12/27/2024     Lab Results   Component Value Date    CHOL 151 09/13/2024    CHOL 174 08/28/2023    CHOL 210 (H) 04/12/2022     Lab Results   Component Value Date    HDL 73.4 09/13/2024    HDL 74.5 08/28/2023    HDL 67 04/12/2022     Lab Results   Component Value Date    LDLCALC 64 09/13/2024    LDLCALC 129 04/12/2022    LDLCALC 119 02/19/2021     Lab Results   Component Value Date    TRIG 67 09/13/2024    TRIG 62 08/28/2023    TRIG 70 04/12/2022     Lab Results   Component Value Date    HGBA1C 5.8 08/12/2024     Other labs not included in the list above were reviewed either before or during this encounter.    History    Past Medical History:   Diagnosis Date    Anemia     Anxiety     Arrhythmia     PAfib    Arthritis     Atrial fib/flutter, transient (Multi)     Bilateral tinnitus     Breast cancer (Multi)     Cataract     Chronic kidney failure     stage three    CKD (chronic kidney disease)     3b    Depression     DM (diabetes mellitus) (Multi)     Endometrial cancer (Multi)     Hyperlipidemia     Hypertension     Left arm pain     Obesity     Parkinson disease (Multi)     Post-menopausal bleeding     Sepsis (Multi)     Septic shock (Multi) 04/2019    Type 2 diabetes mellitus     Uterine cancer  (Multi)     Vertigo     Vitamin D deficiency      Past Surgical History:   Procedure Laterality Date    BIOPSY  2024    uterine    BREAST BIOPSY Right 07/01/2024    IDC    BREAST LUMPECTOMY Right 08/07/2024    BREAST LUMPECTOMY Right     LYMPH NODE DISSECTION Right 08/07/2024     Family History   Problem Relation Name Age of Onset    Cancer Mother      Breast cancer Mother  69    Alzheimer's disease Father       Allergies   Allergen Reactions    Codeine Nausea/vomiting     Current Outpatient Medications on File Prior to Visit   Medication Sig Dispense Refill    acetaminophen (Tylenol 8 HOUR) 650 mg ER tablet Take 2 tablets (1,300 mg) by mouth 2 times a day.      atorvastatin (Lipitor) 40 mg tablet Take 1 tablet (40 mg) by mouth once daily at bedtime. (Patient taking differently: Take 1 tablet (40 mg) by mouth once daily at bedtime. Takes #1 tablet 4 times a week) 90 tablet 1    carbidopa-levodopa (Sinemet)  mg tablet Take 1 tablet by mouth 3 times a day. 90 tablet 5    furosemide (Lasix) 40 mg tablet Take 1 tablet (40 mg) by mouth once daily. (Patient taking differently: Take 1 tablet (40 mg) by mouth once daily. Takes #1 tablet as needed) 90 tablet 1    insulin lispro 100 unit/mL injection Inject 10 Units under the skin 3 times a day before meals. Take as directed per insulin instructions. 9 mL 1    Lantus Solostar U-100 Insulin 100 unit/mL (3 mL) pen Inject 35 Units under the skin once daily in the morning. 10.5 mL 1    lidocaine 4 % patch Place 1 patch over 12 hours on the skin once daily. Remove & discard patch within 12 hours or as directed by MD. 14 patch 0    lisinopril 10 mg tablet TAKE ONE TABLET BY MOUTH ONCE A DAY 90 tablet 0    meclizine (Antivert) 25 mg tablet Take 1 tablet (25 mg) by mouth 3 times a day as needed for dizziness. (Patient taking differently: Take 1 tablet (25 mg) by mouth 3 times a day as needed for dizziness. Takes #1 tablet every morning and as needed for the rest of the day) 90  "tablet 2    megestrol (Megace) 40 mg tablet Take 1 tablet (40 mg total) by mouth 2 times a day. 60 tablet 1    OneTouch Verio test strips strip 1 strip by in vitro route 4 times a day. And as needed 300 strip 3    pen needle, diabetic 32 gauge x 5/32\" needle USE AS DIRECTED 100 each 3    rivaroxaban (Xarelto) 20 mg tablet Take 1 tablet (20 mg) by mouth once daily in the evening. Take with meals. 90 tablet 3    [] sennosides-docusate sodium (Dionna-Colace) 8.6-50 mg tablet Take 2 tablets by mouth 2 times a day for 7 days. While taking Tramadol then as needed for stool softener. 28 tablet 0    simethicone (Mylicon) 80 mg chewable tablet Chew 1 tablet (80 mg) 4 times a day as needed (gas pain).      traMADol (Ultram) 50 mg tablet Take 1 tablet (50 mg) by mouth every 6 hours if needed for moderate pain (4 - 6) or severe pain (7 - 10). 12 tablet 0     No current facility-administered medications on file prior to visit.     Immunization History   Administered Date(s) Administered    COVID-19, mRNA, LNP-S, PF, 30 mcg/0.3 mL dose 2021, 2021    Flu vaccine (IIV4), preservative free *Check age/dose* 2021    Flu vaccine, quadrivalent, high-dose, preservative free, age 65y+ (FLUZONE) 2020, 2023    Flu vaccine, trivalent, preservative free, HIGH-DOSE, age 65y+ (Fluzone) 10/07/2015, 2016, 2018, 10/22/2019    Influenza, injectable, quadrivalent 2023    Influenza, seasonal, injectable 2014    Pfizer COVID-19 vaccine, 12 years and older, (30mcg/0.3mL) (Comirnaty) 2023    Pfizer Purple Cap SARS-CoV-2 2021    Pneumococcal conjugate vaccine, 13-valent (PREVNAR 13) 2015    Pneumococcal polysaccharide vaccine, 23-valent, age 2 years and older (PNEUMOVAX 23) 2014, 2020    Tdap vaccine, age 7 year and older (BOOSTRIX, ADACEL) 2014    Zoster, live 2014     Patient's medical history was reviewed and updated either before or during this " encounter.  ASSESSMENT / PLAN:  Diagnoses and all orders for this visit:  Atrial fibrillation, unspecified type (Multi) (Primary)  Type 2 diabetes mellitus with obesity (Multi)  Endometrial cancer (Multi)  Difficulty in walking  Benign essential HTN  Stage 3b chronic kidney disease (Multi)  Obesity, morbid (Multi)  COVID-19       Patient is being seen for admission for rehab after hospitalization.  Patient was admitted to the hospital for hysterectomy for endometrial cancer.  The patient is getting physical therapy and Occupational Therapy.  Patient has diabetes and is on insulin.  Patient on Accu-Cheks.  Patient's blood pressure has been fairly controlled with current medications.  Patient is on pain medication.  Patient also has atrial fibrillation and is on Xarelto for anticoagulation.  Patient has been diagnosed positive for COVID.  Patient will be in isolation.  No respiratory symptoms.      Tahir Jones MD

## 2025-01-27 ENCOUNTER — NURSING HOME VISIT (OUTPATIENT)
Dept: POST ACUTE CARE | Facility: EXTERNAL LOCATION | Age: 75
End: 2025-01-27
Payer: MEDICARE

## 2025-01-27 DIAGNOSIS — U07.1 COVID-19: ICD-10-CM

## 2025-01-27 DIAGNOSIS — E66.9 TYPE 2 DIABETES MELLITUS WITH OBESITY (MULTI): ICD-10-CM

## 2025-01-27 DIAGNOSIS — N18.32 STAGE 3B CHRONIC KIDNEY DISEASE (MULTI): ICD-10-CM

## 2025-01-27 DIAGNOSIS — I48.91 ATRIAL FIBRILLATION, UNSPECIFIED TYPE (MULTI): Primary | ICD-10-CM

## 2025-01-27 DIAGNOSIS — C54.1 ENDOMETRIAL CANCER (MULTI): ICD-10-CM

## 2025-01-27 DIAGNOSIS — I10 BENIGN ESSENTIAL HTN: ICD-10-CM

## 2025-01-27 DIAGNOSIS — E11.69 TYPE 2 DIABETES MELLITUS WITH OBESITY (MULTI): ICD-10-CM

## 2025-01-27 DIAGNOSIS — R26.2 DIFFICULTY IN WALKING: ICD-10-CM

## 2025-01-27 PROCEDURE — 99310 SBSQ NF CARE HIGH MDM 45: CPT | Performed by: INTERNAL MEDICINE

## 2025-01-27 NOTE — LETTER
Patient: Dina Mullins  : 1950    Encounter Date: 2025    [facility]: [facility]  PROGRESS  NOTE      Dina Mullins is a 74 y.o. female that is being seen  today for follow up at rehab.  Subjective  Patient is a 74-year-old female with history of hypertension, hyperlipidemia, atrial fibrillation, diabetes, obesity, difficulty in walking, history of breast cancer, endometrial cancer s/p hysterectomy who is being seen for follow up at  rehab   Patient has been diagnosed positive for COVID.  Patient has some runny nose and mild cough.  No fever no chills no shortness of breath.  Patient  in isolation.    Patient has history of atrial fibrillation and is on Xarelto.  Patient has diabetes and is on insulin and sliding scale.        ROS  Negative for fever or chills  Positive for nasal discharge and mild cough.  Negative for cough, shortness of breath or wheezing  Negative for chest pain, palpitations, swelling of legs  Negative for abdominal pain, had an episode of diarrhea.  No blood in the stools  Negative for urinary complaints  Negative for headache, dizziness, weakness or numbness  Negative for joint pain, positive for difficulty walking.  Negative for depression or anxiety  All other systems reviewed and were negative   Vitals:    25 1315   BP: 132/70   Pulse: 76   Temp: 37 °C (98.6 °F)      There were no vitals filed for this visit.  There is no height or weight on file to calculate BMI.  Physical Exam  Constitutional: Patient does not appear to be in any acute distress  Head and Face: NCAT  Eyes: Normal external exam, EOMI  ENT: Normal external inspection of ears and nose. Oropharynx normal.  Cardiovascular: RRR, S1/S2, no murmurs, rubs, or gallops, radial pulses +2, no edema of extremities  Pulmonary: CTAB, no respiratory distress.  Abdomen: Surgical wound present.  No significant distention.  Bowel sounds are present.    MSK: Patient does have difficulty  in walking due to arthritis and uses cane..  Skin- No lesions, contusions, or erythema.  Peripheral puslses palpable bilaterally 2+  Neuro: AAO X3, Cranial nerves 2-12 grossly intact,DTR 2+ in all 4 limbs       LABS   [unfilled]  Lab Results   Component Value Date    GLUCOSE 138 (H) 12/27/2024    CALCIUM 9.0 12/27/2024     12/27/2024    K 4.0 12/27/2024    CO2 20 (L) 12/27/2024     (H) 12/27/2024    BUN 23 12/27/2024    CREATININE 1.30 (H) 12/27/2024     Lab Results   Component Value Date    ALT 7 09/13/2024    AST 11 09/13/2024    ALKPHOS 55 09/13/2024    BILITOT 0.9 09/13/2024     Lab Results   Component Value Date    WBC 5.4 12/27/2024    HGB 10.8 (L) 12/27/2024    HCT 34.0 (L) 12/27/2024    MCV 95 12/27/2024     12/27/2024     Lab Results   Component Value Date    CHOL 151 09/13/2024    CHOL 174 08/28/2023    CHOL 210 (H) 04/12/2022     Lab Results   Component Value Date    HDL 73.4 09/13/2024    HDL 74.5 08/28/2023    HDL 67 04/12/2022     Lab Results   Component Value Date    LDLCALC 64 09/13/2024    LDLCALC 129 04/12/2022    LDLCALC 119 02/19/2021     Lab Results   Component Value Date    TRIG 67 09/13/2024    TRIG 62 08/28/2023    TRIG 70 04/12/2022     Lab Results   Component Value Date    HGBA1C 5.8 08/12/2024     Other labs not included in the list above were reviewed either before or during this encounter.    History   Past Medical History:   Diagnosis Date   • Anemia    • Anxiety    • Arrhythmia     PAfib   • Arthritis    • Atrial fib/flutter, transient (Multi)    • Bilateral tinnitus    • Breast cancer (Multi)    • Cataract    • Chronic kidney failure     stage three   • CKD (chronic kidney disease)     3b   • Depression    • DM (diabetes mellitus) (Multi)    • Endometrial cancer (Multi)    • Hyperlipidemia    • Hypertension    • Left arm pain    • Obesity    • Parkinson disease (Multi)    • Post-menopausal bleeding    • Sepsis (Multi)    • Septic shock (Multi) 04/2019   • Type 2  diabetes mellitus    • Uterine cancer (Multi)    • Vertigo    • Vitamin D deficiency      Past Surgical History:   Procedure Laterality Date   • BIOPSY  2024    uterine   • BREAST BIOPSY Right 07/01/2024    IDC   • BREAST LUMPECTOMY Right 08/07/2024   • BREAST LUMPECTOMY Right    • LYMPH NODE DISSECTION Right 08/07/2024     Family History   Problem Relation Name Age of Onset   • Cancer Mother     • Breast cancer Mother  69   • Alzheimer's disease Father       Allergies   Allergen Reactions   • Codeine Nausea/vomiting     Current Outpatient Medications on File Prior to Visit   Medication Sig Dispense Refill   • acetaminophen (Tylenol 8 HOUR) 650 mg ER tablet Take 2 tablets (1,300 mg) by mouth 2 times a day.     • atorvastatin (Lipitor) 40 mg tablet Take 1 tablet (40 mg) by mouth once daily at bedtime. (Patient taking differently: Take 1 tablet (40 mg) by mouth once daily at bedtime. Takes #1 tablet 4 times a week) 90 tablet 1   • carbidopa-levodopa (Sinemet)  mg tablet Take 1 tablet by mouth 3 times a day. 90 tablet 5   • furosemide (Lasix) 40 mg tablet Take 1 tablet (40 mg) by mouth once daily. (Patient taking differently: Take 1 tablet (40 mg) by mouth once daily. Takes #1 tablet as needed) 90 tablet 1   • insulin lispro 100 unit/mL injection Inject 10 Units under the skin 3 times a day before meals. Take as directed per insulin instructions. 9 mL 1   • Lantus Solostar U-100 Insulin 100 unit/mL (3 mL) pen Inject 35 Units under the skin once daily in the morning. 10.5 mL 1   • lidocaine 4 % patch Place 1 patch over 12 hours on the skin once daily. Remove & discard patch within 12 hours or as directed by MD. 14 patch 0   • lisinopril 10 mg tablet TAKE ONE TABLET BY MOUTH ONCE A DAY 90 tablet 0   • meclizine (Antivert) 25 mg tablet Take 1 tablet (25 mg) by mouth 3 times a day as needed for dizziness. (Patient taking differently: Take 1 tablet (25 mg) by mouth 3 times a day as needed for dizziness. Takes #1  "tablet every morning and as needed for the rest of the day) 90 tablet 2   • megestrol (Megace) 40 mg tablet Take 1 tablet (40 mg total) by mouth 2 times a day. 60 tablet 1   • OneTouch Verio test strips strip 1 strip by in vitro route 4 times a day. And as needed 300 strip 3   • pen needle, diabetic 32 gauge x 5/32\" needle USE AS DIRECTED 100 each 3   • rivaroxaban (Xarelto) 20 mg tablet Take 1 tablet (20 mg) by mouth once daily in the evening. Take with meals. 90 tablet 3   • simethicone (Mylicon) 80 mg chewable tablet Chew 1 tablet (80 mg) 4 times a day as needed (gas pain).     • traMADol (Ultram) 50 mg tablet Take 1 tablet (50 mg) by mouth every 6 hours if needed for moderate pain (4 - 6) or severe pain (7 - 10). 12 tablet 0     No current facility-administered medications on file prior to visit.     Immunization History   Administered Date(s) Administered   • COVID-19, mRNA, LNP-S, PF, 30 mcg/0.3 mL dose 03/22/2021, 04/12/2021   • Flu vaccine (IIV4), preservative free *Check age/dose* 12/27/2021   • Flu vaccine, quadrivalent, high-dose, preservative free, age 65y+ (FLUZONE) 11/05/2020, 09/25/2023   • Flu vaccine, trivalent, preservative free, HIGH-DOSE, age 65y+ (Fluzone) 10/07/2015, 09/27/2016, 11/07/2018, 10/22/2019   • Influenza, injectable, quadrivalent 02/28/2023   • Influenza, seasonal, injectable 09/30/2014   • Pfizer COVID-19 vaccine, 12 years and older, (30mcg/0.3mL) (Comirnaty) 11/07/2023   • Pfizer Purple Cap SARS-CoV-2 12/27/2021   • Pneumococcal conjugate vaccine, 13-valent (PREVNAR 13) 11/11/2015   • Pneumococcal polysaccharide vaccine, 23-valent, age 2 years and older (PNEUMOVAX 23) 01/23/2014, 01/28/2020   • Tdap vaccine, age 7 year and older (BOOSTRIX, ADACEL) 09/30/2014   • Zoster, live 09/30/2014     Patient's medical history was reviewed and updated either before or during this encounter.  ASSESSMENT / PLAN:  Diagnoses and all orders for this visit:  Atrial fibrillation, unspecified type " (Multi) (Primary)  Type 2 diabetes mellitus with obesity (Multi)  Endometrial cancer (Multi)  Difficulty in walking  Benign essential HTN  COVID-19  Stage 3b chronic kidney disease (Multi)       Patient is being seen for admission for rehab after hospitalization.  Patient was admitted to the hospital for hysterectomy for endometrial cancer.  The patient is getting physical therapy and Occupational Therapy.  Patient has diabetes and is on insulin.  Patient on Accu-Cheks.  Patient's blood pressure has been fairly controlled with current medications.  Patient is on pain medication.  Patient also has atrial fibrillation and is on Xarelto for anticoagulation.  Patient has been diagnosed positive for COVID.  Patient  in isolation.  No respiratory symptoms.  Patient will be changed to another physician at the facility    Tahir Jones MD       Electronically Signed By: Tahir Jones MD   2/3/25 10:18 AM

## 2025-02-03 ENCOUNTER — HOSPITAL ENCOUNTER (OUTPATIENT)
Dept: RADIATION ONCOLOGY | Facility: CLINIC | Age: 75
Setting detail: RADIATION/ONCOLOGY SERIES
Discharge: HOME | End: 2025-02-03
Payer: MEDICARE

## 2025-02-03 ENCOUNTER — TELEPHONE (OUTPATIENT)
Dept: HOME HEALTH SERVICES | Facility: HOME HEALTH | Age: 75
End: 2025-02-03

## 2025-02-03 VITALS — SYSTOLIC BLOOD PRESSURE: 132 MMHG | DIASTOLIC BLOOD PRESSURE: 70 MMHG | TEMPERATURE: 98.6 F | HEART RATE: 76 BPM

## 2025-02-03 VITALS
SYSTOLIC BLOOD PRESSURE: 118 MMHG | BODY MASS INDEX: 42.32 KG/M2 | TEMPERATURE: 97.9 F | WEIGHT: 231.37 LBS | RESPIRATION RATE: 18 BRPM | HEART RATE: 81 BPM | DIASTOLIC BLOOD PRESSURE: 61 MMHG | OXYGEN SATURATION: 99 %

## 2025-02-03 DIAGNOSIS — N39.46 MIXED INCONTINENCE: ICD-10-CM

## 2025-02-03 DIAGNOSIS — C50.811 MALIGNANT NEOPLASM OF OVERLAPPING SITES OF RIGHT FEMALE BREAST, UNSPECIFIED ESTROGEN RECEPTOR STATUS: Primary | ICD-10-CM

## 2025-02-03 DIAGNOSIS — R42 DIZZINESS: ICD-10-CM

## 2025-02-03 DIAGNOSIS — C54.1 ENDOMETRIAL ADENOCARCINOMA (MULTI): ICD-10-CM

## 2025-02-03 PROCEDURE — 99215 OFFICE O/P EST HI 40 MIN: CPT | Performed by: STUDENT IN AN ORGANIZED HEALTH CARE EDUCATION/TRAINING PROGRAM

## 2025-02-03 PROCEDURE — G2211 COMPLEX E/M VISIT ADD ON: HCPCS | Performed by: STUDENT IN AN ORGANIZED HEALTH CARE EDUCATION/TRAINING PROGRAM

## 2025-02-03 RX ORDER — SCOPOLAMINE 1 MG/3D
1 PATCH, EXTENDED RELEASE TRANSDERMAL
Qty: 10 PATCH | Refills: 0 | Status: SHIPPED | OUTPATIENT
Start: 2025-02-03 | End: 2025-03-05

## 2025-02-03 ASSESSMENT — ENCOUNTER SYMPTOMS
LOSS OF SENSATION IN FEET: 1
DEPRESSION: 0
FEVER: 0
SHORTNESS OF BREATH: 0
CHILLS: 0
OCCASIONAL FEELINGS OF UNSTEADINESS: 1

## 2025-02-03 ASSESSMENT — PAIN SCALES - GENERAL: PAINLEVEL_OUTOF10: 0-NO PAIN

## 2025-02-03 NOTE — PROGRESS NOTES
[facility] [place]: [facility]  PROGRESS  NOTE      Dina Mullins is a 74 y.o. female that is being seen  today for follow up at rehab.  Subjective   Patient is a 74-year-old female with history of hypertension, hyperlipidemia, atrial fibrillation, diabetes, obesity, difficulty in walking, history of breast cancer, endometrial cancer s/p hysterectomy who is being seen for follow up at  rehab   Patient has been diagnosed positive for COVID.  Patient has some runny nose and mild cough.  No fever no chills no shortness of breath.  Patient  in isolation.    Patient has history of atrial fibrillation and is on Xarelto.  Patient has diabetes and is on insulin and sliding scale.        ROS  Negative for fever or chills  Positive for nasal discharge and mild cough.  Negative for cough, shortness of breath or wheezing  Negative for chest pain, palpitations, swelling of legs  Negative for abdominal pain, had an episode of diarrhea.  No blood in the stools  Negative for urinary complaints  Negative for headache, dizziness, weakness or numbness  Negative for joint pain, positive for difficulty walking.  Negative for depression or anxiety  All other systems reviewed and were negative   Vitals:    01/27/25 1315   BP: 132/70   Pulse: 76   Temp: 37 °C (98.6 °F)      There were no vitals filed for this visit.  There is no height or weight on file to calculate BMI.  Physical Exam  Constitutional: Patient does not appear to be in any acute distress  Head and Face: NCAT  Eyes: Normal external exam, EOMI  ENT: Normal external inspection of ears and nose. Oropharynx normal.  Cardiovascular: RRR, S1/S2, no murmurs, rubs, or gallops, radial pulses +2, no edema of extremities  Pulmonary: CTAB, no respiratory distress.  Abdomen: Surgical wound present.  No significant distention.  Bowel sounds are present.    MSK: Patient does have difficulty in walking due to arthritis and uses cane..  Skin- No lesions, contusions, or  erythema.  Peripheral puslses palpable bilaterally 2+  Neuro: AAO X3, Cranial nerves 2-12 grossly intact,DTR 2+ in all 4 limbs       LABS   [unfilled]  Lab Results   Component Value Date    GLUCOSE 138 (H) 12/27/2024    CALCIUM 9.0 12/27/2024     12/27/2024    K 4.0 12/27/2024    CO2 20 (L) 12/27/2024     (H) 12/27/2024    BUN 23 12/27/2024    CREATININE 1.30 (H) 12/27/2024     Lab Results   Component Value Date    ALT 7 09/13/2024    AST 11 09/13/2024    ALKPHOS 55 09/13/2024    BILITOT 0.9 09/13/2024     Lab Results   Component Value Date    WBC 5.4 12/27/2024    HGB 10.8 (L) 12/27/2024    HCT 34.0 (L) 12/27/2024    MCV 95 12/27/2024     12/27/2024     Lab Results   Component Value Date    CHOL 151 09/13/2024    CHOL 174 08/28/2023    CHOL 210 (H) 04/12/2022     Lab Results   Component Value Date    HDL 73.4 09/13/2024    HDL 74.5 08/28/2023    HDL 67 04/12/2022     Lab Results   Component Value Date    LDLCALC 64 09/13/2024    LDLCALC 129 04/12/2022    LDLCALC 119 02/19/2021     Lab Results   Component Value Date    TRIG 67 09/13/2024    TRIG 62 08/28/2023    TRIG 70 04/12/2022     Lab Results   Component Value Date    HGBA1C 5.8 08/12/2024     Other labs not included in the list above were reviewed either before or during this encounter.    History    Past Medical History:   Diagnosis Date    Anemia     Anxiety     Arrhythmia     PAfib    Arthritis     Atrial fib/flutter, transient (Multi)     Bilateral tinnitus     Breast cancer (Multi)     Cataract     Chronic kidney failure     stage three    CKD (chronic kidney disease)     3b    Depression     DM (diabetes mellitus) (Multi)     Endometrial cancer (Multi)     Hyperlipidemia     Hypertension     Left arm pain     Obesity     Parkinson disease (Multi)     Post-menopausal bleeding     Sepsis (Multi)     Septic shock (Multi) 04/2019    Type 2 diabetes mellitus     Uterine cancer (Multi)     Vertigo     Vitamin D deficiency      Past Surgical  History:   Procedure Laterality Date    BIOPSY  2024    uterine    BREAST BIOPSY Right 07/01/2024    IDC    BREAST LUMPECTOMY Right 08/07/2024    BREAST LUMPECTOMY Right     LYMPH NODE DISSECTION Right 08/07/2024     Family History   Problem Relation Name Age of Onset    Cancer Mother      Breast cancer Mother  69    Alzheimer's disease Father       Allergies   Allergen Reactions    Codeine Nausea/vomiting     Current Outpatient Medications on File Prior to Visit   Medication Sig Dispense Refill    acetaminophen (Tylenol 8 HOUR) 650 mg ER tablet Take 2 tablets (1,300 mg) by mouth 2 times a day.      atorvastatin (Lipitor) 40 mg tablet Take 1 tablet (40 mg) by mouth once daily at bedtime. (Patient taking differently: Take 1 tablet (40 mg) by mouth once daily at bedtime. Takes #1 tablet 4 times a week) 90 tablet 1    carbidopa-levodopa (Sinemet)  mg tablet Take 1 tablet by mouth 3 times a day. 90 tablet 5    furosemide (Lasix) 40 mg tablet Take 1 tablet (40 mg) by mouth once daily. (Patient taking differently: Take 1 tablet (40 mg) by mouth once daily. Takes #1 tablet as needed) 90 tablet 1    insulin lispro 100 unit/mL injection Inject 10 Units under the skin 3 times a day before meals. Take as directed per insulin instructions. 9 mL 1    Lantus Solostar U-100 Insulin 100 unit/mL (3 mL) pen Inject 35 Units under the skin once daily in the morning. 10.5 mL 1    lidocaine 4 % patch Place 1 patch over 12 hours on the skin once daily. Remove & discard patch within 12 hours or as directed by MD. 14 patch 0    lisinopril 10 mg tablet TAKE ONE TABLET BY MOUTH ONCE A DAY 90 tablet 0    meclizine (Antivert) 25 mg tablet Take 1 tablet (25 mg) by mouth 3 times a day as needed for dizziness. (Patient taking differently: Take 1 tablet (25 mg) by mouth 3 times a day as needed for dizziness. Takes #1 tablet every morning and as needed for the rest of the day) 90 tablet 2    megestrol (Megace) 40 mg tablet Take 1 tablet (40 mg  "total) by mouth 2 times a day. 60 tablet 1    OneTouch Verio test strips strip 1 strip by in vitro route 4 times a day. And as needed 300 strip 3    pen needle, diabetic 32 gauge x 5/32\" needle USE AS DIRECTED 100 each 3    rivaroxaban (Xarelto) 20 mg tablet Take 1 tablet (20 mg) by mouth once daily in the evening. Take with meals. 90 tablet 3    simethicone (Mylicon) 80 mg chewable tablet Chew 1 tablet (80 mg) 4 times a day as needed (gas pain).      traMADol (Ultram) 50 mg tablet Take 1 tablet (50 mg) by mouth every 6 hours if needed for moderate pain (4 - 6) or severe pain (7 - 10). 12 tablet 0     No current facility-administered medications on file prior to visit.     Immunization History   Administered Date(s) Administered    COVID-19, mRNA, LNP-S, PF, 30 mcg/0.3 mL dose 03/22/2021, 04/12/2021    Flu vaccine (IIV4), preservative free *Check age/dose* 12/27/2021    Flu vaccine, quadrivalent, high-dose, preservative free, age 65y+ (FLUZONE) 11/05/2020, 09/25/2023    Flu vaccine, trivalent, preservative free, HIGH-DOSE, age 65y+ (Fluzone) 10/07/2015, 09/27/2016, 11/07/2018, 10/22/2019    Influenza, injectable, quadrivalent 02/28/2023    Influenza, seasonal, injectable 09/30/2014    Pfizer COVID-19 vaccine, 12 years and older, (30mcg/0.3mL) (Comirnaty) 11/07/2023    Pfizer Purple Cap SARS-CoV-2 12/27/2021    Pneumococcal conjugate vaccine, 13-valent (PREVNAR 13) 11/11/2015    Pneumococcal polysaccharide vaccine, 23-valent, age 2 years and older (PNEUMOVAX 23) 01/23/2014, 01/28/2020    Tdap vaccine, age 7 year and older (BOOSTRIX, ADACEL) 09/30/2014    Zoster, live 09/30/2014     Patient's medical history was reviewed and updated either before or during this encounter.  ASSESSMENT / PLAN:  Diagnoses and all orders for this visit:  Atrial fibrillation, unspecified type (Multi) (Primary)  Type 2 diabetes mellitus with obesity (Multi)  Endometrial cancer (Multi)  Difficulty in walking  Benign essential " HTN  COVID-19  Stage 3b chronic kidney disease (Multi)       Patient is being seen for admission for rehab after hospitalization.  Patient was admitted to the hospital for hysterectomy for endometrial cancer.  The patient is getting physical therapy and Occupational Therapy.  Patient has diabetes and is on insulin.  Patient on Accu-Cheks.  Patient's blood pressure has been fairly controlled with current medications.  Patient is on pain medication.  Patient also has atrial fibrillation and is on Xarelto for anticoagulation.  Patient has been diagnosed positive for COVID.  Patient  in isolation.  No respiratory symptoms.  Patient will be changed to another physician at the facility    Tahir Jones MD

## 2025-02-03 NOTE — PROGRESS NOTES
Radiation Oncology Follow-Up    Patient Name:  Dina Mullins  MRN:  [id number]  :  1950    Referring Provider: Bib Buitrago MD  Primary Care Provider: Ken Saleh PA-C  Care Team: Patient Care Team:  Ken Saleh PA-C as PCP - General (Internal Medicine)  Thelma Boudreaux APRN-CNP as PCP - [employer] PCP  Maine Birch MD as Consulting Physician (Hematology and Oncology)  Sherron Taylor RN as Registered Nurse (Hematology and Oncology)  Mei Black MD as Consulting Physician (Obstetrics and Gynecology)    Date of Service: 2/3/2025    SUBJECTIVE  History of Present Illness:  Dina Mullins is a 74 y.o. female who was previously seen at the [facility] Department of Radiation Oncology for node positive, right breast cancer.      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  All margins negative for invasive carcinoma with 1.8 mm from the anterior/medial margin, DCIS is less than 1 mm from the anterior/medial margin, status post partial mastectomy and sentinel lymph node biopsy, status post adjuvant radiation therapy  #) Right breast, invasive ductal carcinoma at 12:00, pT1c pN1mi (sn), ER positive greater than 95%, IN +85%, HER2 equivocal (2+)/dual-kanika negative (1.4), 1.4 cm, G1, indeterminate LVI, all margins negative for invasive carcinoma with less than 1 mm from the posterior margin and 1.6 mm from the anterior margin and 2.1 mm from the medial margin, status post partial mastectomy and sentinel lymph node biopsy, status post adjuvant radiation therapy  #) FIGO grade 2 endometrioid type adenocarcinoma, status post total abdominal hysterectomy/bilateral salpingo-oophorectomy     Ms. Burgos is a postmenopausal  female with a prior history of a normal mammogram in 2021 that presented on 4/17/2024 with a bilateral screening mammogram with tomosynthesis showing a lesion in the central right breast at middle depth.  The patient denies any palpable breast mass, nipple discharge or breast skin changes.  The patient was evaluated further with right diagnostic mammogram with tomosynthesis on 5/9/2024 which revealed at middle depth in the right breast at 12:00, 9 cm from the nipple a 1 cm spiculated mass.  On ultrasound at 12:00, 9-1/2 cm from the nipple measured a 0.8 x 0.8 x 1.1 cm mass with a lymph node in level 1 measuring 0.8 x 1.1 x 1.8 cm with cortical thickening up to 4 mm.  The patient underwent ultrasound-guided core biopsy on 7/1/2024 to an additional lesion seen at 11:00, 8 cm from the nipple which showed invasive ductal carcinoma, grade 3, ER negative, FL negative, HER2 negative (1+), originally seen right breast 12:00, 9 cm from the nipple lesion showed invasive ductal carcinoma, grade 1 with microcalcifications, ER positive greater than 95%, FL +85%, HER2 equivocal (2+)/dual-kanika negative (1.4) and right axillary lymph node biopsy was negative for carcinoma showing reactive changes.       The patient underwent right breast lumpectomy at 11:00 and 12:00 as well as right sentinel lymph node biopsy on 8/14/2024.  Pathology revealed in the 11:00 lumpectomy cavity, poorly differentiated carcinoma with squamous differential (metaplastic carcinoma) measuring 2.4 cm, grade 3, with ductal carcinoma in situ with solid subtype, grade 3 and all margins negative for invasive carcinoma and DCIS less than 1 mm from the anterior/medial margin.  Pathology revealed in the 12:00 lumpectomy specimen invasive ductal carcinoma measuring 1.4 cm, grade 1, indeterminate LVI, all margins negative for invasive carcinoma with less than 1 mm from the posterior margin and 1.6 mm from the anterior margin.  Wanette lymph node biopsy revealed 1/2  lymph nodes involved by micrometastatic low-grade carcinoma consistent with the hormone positive, low-grade primary.    The patient completed adjuvant breast radiation therapy    On 2024, the patient was recently found to have FIGO grade 2 endometrial adenocarcinoma, the patient will be planned for hysterectomy and sentinel lymph node sampling in the upcoming weeks.  The patient has met with medical oncology and was deemed a nonoptimal candidate for adjuvant chemotherapy.  The patient underwent total abdominal hysterectomy and salpingo-oophorectomy on 2024 which revealed endometrial carcinoma, endometrioid type, FIGO grade 2, with less than 50% myometrial invasion.  3 cm focus of FIGO grade 2, endometrioid type adenocarcinoma with 4/19 mm (21%) myometrial invasion, no involvement of cervical stromal, no evidence of lower uterine segment involvement or no evidence of lymphovascular invasion.    The patient completed 4256 cGy in 16 fractions to the right breast, low axilla and 1000 cGy in 4 fraction to the tumor beds a total dose of 5256 cGy in 20 fractions from 2024-2024.      2/3/2025: The patient presents today for discussion of potential adjuvant vaginal cuff brachytherapy.  The patient notes continued radiation dermatitis that has improved.  The patient has tenderness of the right breast.  The patient has consistent dizziness and weakness, but is able to ambulate.  The patient was discharged from skilled nursing facility, however is having difficulty with stairs and care at home without assistance from family for supervision.        Onset of menses - 11  Onset of menopause - 64  Post-menopausal bleeding - Intermittent bleeding status post hysterectomy for endometrial carcinoma  Estrogen replacement: Denies     Labs:  None     Pathology:  Uterine  2024-A. Left external iliac sentinel lymph node, excision:  - One lymph node, negative for metastatic carcinoma (0/1).  B. Right obturator  sentinel lymph node, excision:  - One lymph node, negative for metastatic carcinoma (0/1).  C. Uterus, cervix, bilateral fallopian tubes and ovaries, robot-assisted hysterectomy with bilateral salpingo-oophorectomy:  - Endometrial carcinoma, endometrioid type, FIGO grade 2, with less than 50% myometrial invasion.  3 cm focus of FIGO grade 2, endometrioid type adenocarcinoma with 4/19 mm (21%) myometrial invasion, no involvement of cervical stromal, no evidence of lower uterine segment involvement or no evidence of lymphovascular invasion  - Myometrium with adenomyosis (uninvolved by carcinoma) and leiomyomata.  - Cervix with nabothian cysts and tunnel clusters; uninvolved by carcinoma.  - Bilateral fallopian tubes with paratubal cysts.  - Bilateral ovaries with cortical inclusion cysts.   A. PELVIC WASHING:  No malignant cells identified  pT1a pN0 cM0    9/26/2024-A.  ENDOMETRIUM, BIOPSY: Endometrial adenocarcinoma, grade 2, Comment: The results of p53 and MMR immunostains will be reported in addenda.   B.  CERVIX, BIOPSY: Fragments of inflamed endocervical mucosa, clinically polyp.    8/14/2024-A. Right breast lumpectomy at 11 o'clock:-- Invasive poorly differentiated carcinoma with squamous differentiation (metaplastic carcinoma) and ductal carcinoma in situ, 2.4 cm, G3 with DCIS of solid subtype, G3, indeterminant lymphovascular invasion.  All margins negative for invasive carcinoma with 1.8 mm from the anterior/medial margin, DCIS is less than 1 mm from the anterior/medial margin.  -- Changes consistent with site of previous biopsy.    pT2 pN0 (sn)   B. Right breast lumpectomy at 12 o'clock: -- Invasive ductal carcinoma, 1.4 cm, G1, indeterminate LVI, all margins negative for invasive carcinoma with less than 1 mm from the posterior margin and 1.6 mm from the anterior margin and 2.1 mm from the medial margin.  -- Changes consistent with site of previous biopsy.    pT1c pN1mi (sn)     C. Right breast sentinel  lymph node with magseed, excision: -- One of two lymph nodes involved by micrometastasis, from low-grade carcinoma.   -- Changes consistent with site of previous biopsy.   Note: The largest contiguous focus of tumor involving the lymph node measures 1.6 mm consistent with micrometastasis. Immunohistochemical stains for cytokeratin AE1/3 confirm the presence of tumor in the lymph node. The tumor morphologically appears lower grade and is estrogen and progesterone receptor strongly and diffusely positive consistent with a metastasis of the lower grade tumor at 12 o'clock. No extranodal extension is identified.      2024-A. Right breast mass, ultrasound-guided core biopsy at 11 o'clock, 8 cm from nipple:--  Invasive ductal carcinoma, grade 3 with necrosis, Note:  In this limited sample, the invasive carcinoma measures up to 0.8 cm in greatest dimension.   B. Right breast mass, ultrasound-guided core biopsy at 12 o'clock, 9 cm from nipple:  -- Invasive ductal carcinoma, grade 1 with associated microcalcifications, Note:  In this limited sample, the invasive carcinoma measures up to 9 mm in greatest dimension.  C. Right axillary lymph node, ultrasound-guided biopsy:  -- Portion of lymph node with reactive changes; negative for carcinoma, Note: Immunohistochemical stain for cytokeratin AE1/3 is negative.      Disease Associated Genomics:    Uterine  MMRp: Endometrial carcinoma with normal mismatch repair protein expression.  MLH-1 : POSITIVE                                        PMS-2 : POSITIVE                                        MSH-2 : POSITIVE                                      MSH-6 : POSITIVE           Breast  Right breast 11:00: ER negative, NH negative, HER2 negative (1+)  Right breast 12:00: ER positive greater than 95%, NH +85%, HER2 equivocal (2+)/dual-kanika negative (1.4)      Imagin2024-PET/CT: No evidence of cervical lymphadenopathy.  Postprocedural changes in the right breast with avid  lesion max SUV 2.8 in the superior lateral quadrant, subcentimeter pulmonary calcified nodule in the left lower lobe.  Avid right axillary lymph node with a max SUV 3.8.  No evidence of avid mediastinal or axillary lymphadenopathy.  Uptake of max SUV 7.4 in the endometrial cavity.  No evidence of concerning osseous lesions.    9/13/2024-ultrasound pelvis: 8.2 x 3.7 x 4.8 cm uterus with a 1.0 x 1.2 x 1.3 cm solid and hyperechoic submucosal mass and a intramural mass measuring 1.6 x 1.8 x 1.8 cm.  Endometrial thickness measuring 1.1 cm.  Neither ovary able to be visualized.    5/9/2024-right diagnostic mammogram with ultrasound: At middle depth in the right breast at 12:00, 9 cm from the nipple a 1 cm spiculated mass.  On ultrasound at 12:00, 9-1/2 cm from the nipple measured a 0.8 x 0.8 x 1.1 cm mass with a lymph node in level 1 measuring 0.8 x 1.1 x 1.8 cm with cortical thickening up to 4 mm    4/17/2024-bilateral screening mammogram with tomosynthesis:  lesion in the central right breast at middle depth     12/20/2021-bilateral mammogram screening with tomosynthesis: No significant masses, calcifications or other findings seen in either breast.  (BI-RADS 1)     Prior Systemic Therapies:  None     Prior Surgeries:  1/8/2024-robotic assisted total laparoscopic hysterectomy/bilateral salpingo-oophorectomy  8/14/2024-right partial mastectomy x 2 and sentinel lymph node biopsy    Prior Radiation Therapy:  11/22/2024-12/26/2024: 4256 cGy in 16 fractions to the right breast, low axilla and 1000 cGy in 4 fraction to the tumor beds a total dose of 5256 cGy in 20 fractions    Treatment Rendered:   3D CRT: Right Breast with lymph nodes    Treatment Period Technique Fraction Dose Fractions Total Dose   Course 1 11/22/2024-12/26/2024  (days elapsed: 34)         R_Breast_Axilla 11/22/2024-12/19/2024 3D 266 / 266 cGy 16 / 16 4256 / 4,256 cGy         R_TB_boost 12/20/2024-12/26/2024 3D 250 / 250 cGy 4 / 4 1000 / 1,000 cGy        Review of Systems:   Review of Systems   Constitutional:  Negative for chills and fever.   Respiratory:  Negative for shortness of breath.    Cardiovascular:  Negative for chest pain.   Genitourinary:  Negative for vaginal bleeding.        Performance Status:   The Karnofsky performance scale today is 80, Normal activity with effort; some signs or symptoms of disease (ECOG equivalent 1).       OBJECTIVE  Vital Signs:  There were no vitals taken for this visit.  Physical Exam  Vitals and nursing note reviewed. Exam conducted with a chaperone present.   HENT:      Head: Normocephalic.   Chest:   Breasts:     Right: Skin change and tenderness present. No swelling or mass.      Comments: Noted right lumpectomy and sentinel lymph node biopsy scars  Musculoskeletal:         General: Normal range of motion.      Cervical back: Normal range of motion.   Lymphadenopathy:      Upper Body:      Right upper body: No supraclavicular, axillary or pectoral adenopathy.      Left upper body: No supraclavicular, axillary or pectoral adenopathy.   Neurological:      General: No focal deficit present.      Mental Status: She is alert.            ASSESSMENT:   Dina Mullins is a 74 y.o. female with Malignant neoplasm of overlapping sites of right female breast, Clinical: Stage IB (cT1, cN0(f), cM0, G3, ER-, LA-, HER2-)  Malignant neoplasm of overlapping sites of right female breast, Clinical: Stage IA (cT1, cN0(f), cM0, G1, ER+, LA+, HER2-)  Malignant neoplasm of overlapping sites of right female breast, Pathologic: Stage IIIA (pT2(2), pN1mi(sn), cM0, G3, ER-, LA-, HER2-).      Ms. Mullins is a postmenopausal female with 2 primary malignant breast cancers in the right breast, including a early stage, grade 3, poorly differentiated squamous differentiated metaplastic carcinoma, triple negative, and invasive ductal carcinoma, grade 1, ER greater than 95%, LA 85%, HER2 negative, node positive breast cancer.  The patient  met with medical oncology and was deemed a not optimal adjuvant chemotherapy candidate.  The patient completed adjuvant radiation therapy.    The patient was recently diagnosed with endometrioid FIGO stage IA2, pT1a pN0 (sn) cM0, status post TOÑO/BSO and sentinel lymph node biopsy.  I discussed with the patient regarding her clinical presentation, pathology, imaging and treatment recommendations for GOG-high intermediate risk endometrioid adenocarcinoma.  I discussed with the patient regarding the treatment paradigms including surveillance with pelvic examinations versus the improvement in local control with vaginal cuff brachytherapy; however, I discussed with the patient regarding her Portec-low intermediate risk based on histological findings.    I discussed with the patient regarding vaginal cuff brachytherapy having no impact on overall survival or regional control.  All questions and concerns were addressed during the visit.    The patient will be evaluated by gynecological oncology, if the patient desires vaginal cuff brachytherapy the patient will be scheduled for CT simulation.  The patient will follow-up in approximately 4 weeks to review brain MRI findings and follow-up after initiation of scopolamine patches for dizziness.    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  All margins  negative for invasive carcinoma with 1.8 mm from the anterior/medial margin, DCIS is less than 1 mm from the anterior/medial margin, status post partial mastectomy and sentinel lymph node biopsy, status post adjuvant radiation therapy  -Following with breast surgery for mammogram  -Status post 4256 cGy in 16 fractions to the right breast and undissected axilla followed by tumor bed boost 1000 cGy in 4 fractions to a total dose of 5256 cGy in 20 fractions  -Medical oncology deemed a nonoperable candidate for adjuvant chemotherapy based on comorbid risk factors  -Status post partial mastectomy and sentinel lymph node biopsy     #) Right breast, invasive ductal carcinoma at 12:00, pT1c pN1mi (sn), ER positive greater than 95%, [place] +85%, HER2 equivocal (2+)/dual-kanika negative (1.4), 1.4 cm, G1, indeterminate LVI, all margins negative for invasive carcinoma with less than 1 mm from the posterior margin and 1.6 mm from the anterior margin and 2.1 mm from the medial margin, status post partial mastectomy and sentinel lymph node biopsy, status post adjuvant radiation therapy  -Status post 4256 cGy in 16 fractions to the right breast and undissected axilla followed by tumor bed boost 1000 cGy in 4 fractions to a total dose of 5256 cGy in 20 fractions  -Status post partial mastectomy and sentinel lymph node biopsy     #) Multiple primary breast malignancies  -Patient referred to genetics by breast surgery    #) Acute toxicities  -Radiation dermatitis/hyperpigmentation: Grade 1, recommended U[place]derly smooth  -Dizziness: Ordering MRI brain to rule out central neurological causes, trial of scopolamine patches    #) Long term side effects    A total of 30 minutes were spent face-to-face with the patient, with an additional 10 minutes spent reviewing records including imaging, pathology and physician notes.    Bib Buitrago MD  [facility] [place]  [facility] clinical  - Department of Radiation  Oncology  Phone: [phone]  Fax: [phone]  [facility] secure chat preferred / Pager [phone]    Note: This was transcribed using Dragon voice recognition software. Attempts were made to correct any errors; however, errors or omissions may be present.     NCCN Guidelines were applicable to guide this patients treatment plan.

## 2025-02-03 NOTE — TELEPHONE ENCOUNTER
"Hi Dr Buitrago,     Thank you for the home care referral.     Medicare guidelines require that pt must be home bound for services to be covered. Please make the correction to the home bound status on the referral if pt meets home bound criteria.    Medicare guidelines also require  that there must be a skilled need. If you can update the referral and you visit note documentation to states why home care is ordered and the skilled need. \"Eval and treat for skilled care\" will not qualify according to Medicare guidelines.     Thank you for your understanding and as soon as the referral is updated, we can continue with processing.     Home Care Intake Dept  "

## 2025-02-04 NOTE — TELEPHONE ENCOUNTER
The patient is not homebound; however, there is concern of a fall risk given the situation of lack of family support while her  is in rehab.    The patient would like evaluation and possibly readmission back to her previous skilled nursing facility.  The patient may need to discuss nonskilled, possibly respite admission to the facility.    How do we get PT to eval for skilled coverage or HHC, outside this order?

## 2025-02-04 NOTE — ADDENDUM NOTE
Encounter addended by: Bib Buitrago MD on: 2/4/2025 12:27 PM   Actions taken: Visit diagnoses modified, Order list changed, Diagnosis association updated

## 2025-02-06 ENCOUNTER — TELEPHONE (OUTPATIENT)
Dept: GYNECOLOGIC ONCOLOGY | Facility: HOSPITAL | Age: 75
End: 2025-02-06

## 2025-02-06 ENCOUNTER — OFFICE VISIT (OUTPATIENT)
Dept: GYNECOLOGIC ONCOLOGY | Facility: CLINIC | Age: 75
End: 2025-02-06
Payer: MEDICARE

## 2025-02-06 VITALS
OXYGEN SATURATION: 98 % | WEIGHT: 230.82 LBS | HEART RATE: 80 BPM | SYSTOLIC BLOOD PRESSURE: 87 MMHG | TEMPERATURE: 97.2 F | DIASTOLIC BLOOD PRESSURE: 61 MMHG | RESPIRATION RATE: 18 BRPM | BODY MASS INDEX: 42.22 KG/M2

## 2025-02-06 DIAGNOSIS — Z90.721 S/P HYSTERECTOMY WITH OOPHORECTOMY: ICD-10-CM

## 2025-02-06 DIAGNOSIS — Z90.710 S/P HYSTERECTOMY WITH OOPHORECTOMY: ICD-10-CM

## 2025-02-06 DIAGNOSIS — R53.1 GENERALIZED WEAKNESS: ICD-10-CM

## 2025-02-06 DIAGNOSIS — R42 DIZZINESS: ICD-10-CM

## 2025-02-06 DIAGNOSIS — C54.1 ENDOMETRIAL CANCER (MULTI): Primary | ICD-10-CM

## 2025-02-06 LAB
ANION GAP SERPL CALC-SCNC: 16 MMOL/L (ref 10–20)
BASOPHILS # BLD AUTO: 0.03 X10*3/UL (ref 0–0.1)
BASOPHILS NFR BLD AUTO: 0.4 %
BUN SERPL-MCNC: 25 MG/DL (ref 6–23)
CALCIUM SERPL-MCNC: 9.1 MG/DL (ref 8.6–10.3)
CHLORIDE SERPL-SCNC: 106 MMOL/L (ref 98–107)
CO2 SERPL-SCNC: 21 MMOL/L (ref 21–32)
CREAT SERPL-MCNC: 1.3 MG/DL (ref 0.5–1.05)
EGFRCR SERPLBLD CKD-EPI 2021: 43 ML/MIN/1.73M*2
EOSINOPHIL # BLD AUTO: 0.15 X10*3/UL (ref 0–0.4)
EOSINOPHIL NFR BLD AUTO: 2.2 %
ERYTHROCYTE [DISTWIDTH] IN BLOOD BY AUTOMATED COUNT: 13.3 % (ref 11.5–14.5)
GLUCOSE SERPL-MCNC: 228 MG/DL (ref 74–99)
HCT VFR BLD AUTO: 37 % (ref 36–46)
HGB BLD-MCNC: 12 G/DL (ref 12–16)
IMM GRANULOCYTES # BLD AUTO: 0.05 X10*3/UL (ref 0–0.5)
IMM GRANULOCYTES NFR BLD AUTO: 0.7 % (ref 0–0.9)
LYMPHOCYTES # BLD AUTO: 0.64 X10*3/UL (ref 0.8–3)
LYMPHOCYTES NFR BLD AUTO: 9.4 %
MCH RBC QN AUTO: 30.5 PG (ref 26–34)
MCHC RBC AUTO-ENTMCNC: 32.4 G/DL (ref 32–36)
MCV RBC AUTO: 94 FL (ref 80–100)
MONOCYTES # BLD AUTO: 0.42 X10*3/UL (ref 0.05–0.8)
MONOCYTES NFR BLD AUTO: 6.2 %
NEUTROPHILS # BLD AUTO: 5.51 X10*3/UL (ref 1.6–5.5)
NEUTROPHILS NFR BLD AUTO: 81.1 %
NRBC BLD-RTO: 0 /100 WBCS (ref 0–0)
PLATELET # BLD AUTO: 301 X10*3/UL (ref 150–450)
POTASSIUM SERPL-SCNC: 4 MMOL/L (ref 3.5–5.3)
RBC # BLD AUTO: 3.93 X10*6/UL (ref 4–5.2)
SODIUM SERPL-SCNC: 139 MMOL/L (ref 136–145)
WBC # BLD AUTO: 6.8 X10*3/UL (ref 4.4–11.3)

## 2025-02-06 PROCEDURE — 4010F ACE/ARB THERAPY RXD/TAKEN: CPT | Performed by: STUDENT IN AN ORGANIZED HEALTH CARE EDUCATION/TRAINING PROGRAM

## 2025-02-06 PROCEDURE — 85025 COMPLETE CBC W/AUTO DIFF WBC: CPT | Performed by: STUDENT IN AN ORGANIZED HEALTH CARE EDUCATION/TRAINING PROGRAM

## 2025-02-06 PROCEDURE — 3074F SYST BP LT 130 MM HG: CPT | Performed by: STUDENT IN AN ORGANIZED HEALTH CARE EDUCATION/TRAINING PROGRAM

## 2025-02-06 PROCEDURE — 1125F AMNT PAIN NOTED PAIN PRSNT: CPT | Performed by: STUDENT IN AN ORGANIZED HEALTH CARE EDUCATION/TRAINING PROGRAM

## 2025-02-06 PROCEDURE — 3078F DIAST BP <80 MM HG: CPT | Performed by: STUDENT IN AN ORGANIZED HEALTH CARE EDUCATION/TRAINING PROGRAM

## 2025-02-06 PROCEDURE — 99215 OFFICE O/P EST HI 40 MIN: CPT | Performed by: STUDENT IN AN ORGANIZED HEALTH CARE EDUCATION/TRAINING PROGRAM

## 2025-02-06 PROCEDURE — 1111F DSCHRG MED/CURRENT MED MERGE: CPT | Performed by: STUDENT IN AN ORGANIZED HEALTH CARE EDUCATION/TRAINING PROGRAM

## 2025-02-06 PROCEDURE — 1157F ADVNC CARE PLAN IN RCRD: CPT | Performed by: STUDENT IN AN ORGANIZED HEALTH CARE EDUCATION/TRAINING PROGRAM

## 2025-02-06 PROCEDURE — 1159F MED LIST DOCD IN RCRD: CPT | Performed by: STUDENT IN AN ORGANIZED HEALTH CARE EDUCATION/TRAINING PROGRAM

## 2025-02-06 PROCEDURE — 1160F RVW MEDS BY RX/DR IN RCRD: CPT | Performed by: STUDENT IN AN ORGANIZED HEALTH CARE EDUCATION/TRAINING PROGRAM

## 2025-02-06 PROCEDURE — 80048 BASIC METABOLIC PNL TOTAL CA: CPT | Performed by: STUDENT IN AN ORGANIZED HEALTH CARE EDUCATION/TRAINING PROGRAM

## 2025-02-06 ASSESSMENT — PAIN SCALES - GENERAL: PAINLEVEL_OUTOF10: 6

## 2025-02-06 NOTE — PROGRESS NOTES
Patient ID: Dina Mullins is a 74 y.o. female.  Referring Physician: No referring provider defined for this encounter.  Primary Care Provider: Ken Saleh PA-C    Subjective    Patient presents today in posteroperative follow up s/p robot-assisted TLH/BSO, sentinel LND on 1/3/24 for endometrial cancer. Postoperative course significant for inpatient rehabilitation in setting of generalized weakness in setting of COVID+ respiratory symptoms and concern for self-care in home, discharged last week. Continues to struggle with generalized weakness and dizziness, shortness of breath on exertion since her procedure. Limited ambulation and presents today in wheelchair. She declines in-home PT due to concerns over having others see her home in disarray. Denies falls. Otherwise reports persistent back and left shoulder pain - no chest pain. Denies nausea/vomiting, fevers, chills. Decreased appetite. Denies redness/drainage from surgical site. Voiding and having regular bowel movements since her surgical procedure; no other interval changes or concerns.     A comprehensive review of systems was performed and otherwise negative.       Objective    BSA: 2.14 meters squared  BP 87/61 (BP Location: Right arm, Patient Position: Sitting, BP Cuff Size: Large adult long)   Pulse 80   Temp 36.2 °C (97.2 °F)   Resp 18   Wt 105 kg (230 lb 13.2 oz)   SpO2 98%   BMI 42.22 kg/m²      Physical Exam  Vitals and nursing note reviewed. Exam conducted with a chaperone present.   Constitutional:       General: She is not in acute distress.     Appearance: Normal appearance. She is normal weight.   HENT:      Head: Normocephalic and atraumatic.      Mouth/Throat:      Mouth: Mucous membranes are moist.   Eyes:      Extraocular Movements: Extraocular movements intact.      Conjunctiva/sclera: Conjunctivae normal.      Pupils: Pupils are equal, round, and reactive to light.   Cardiovascular:      Rate and Rhythm: Normal rate.    Pulmonary:      Effort: Pulmonary effort is normal. No respiratory distress.      Breath sounds: No wheezing, rhonchi or rales.   Abdominal:      General: Bowel sounds are normal.      Palpations: Abdomen is soft. There is no mass.      Tenderness: There is no guarding or rebound.      Hernia: No hernia is present.      Comments: Laparoscopic incisions C/D/I without redness, drainage or erythema   Genitourinary:     General: Normal vulva.      Vagina: Normal. No vaginal discharge, erythema or lesions.      Comments: Surgically absent uterus and cervix  Intact vaginal cuff without separation or drainage   Musculoskeletal:         General: Normal range of motion.      Cervical back: Normal range of motion and neck supple.   Skin:     General: Skin is warm.      Findings: No erythema or rash.   Neurological:      General: No focal deficit present.      Mental Status: She is alert and oriented to person, place, and time.      Motor: Weakness present.   Psychiatric:         Mood and Affect: Mood normal.         Behavior: Behavior normal.       Pathology  1/8/25 RA TLH/BSO, sLND  A. Left external iliac sentinel lymph node, excision:  - One lymph node, negative for metastatic carcinoma (0/1).  B. Right obturator sentinel lymph node, excision:  - One lymph node, negative for metastatic carcinoma (0/1).  C. Uterus, cervix, bilateral fallopian tubes and ovaries, robot-assisted hysterectomy with bilateral salpingo-oophorectomy:  - Endometrial carcinoma, endometrioid type, FIGO grade 2, with less than 50% myometrial invasion. See comment and synoptic report.  - Myometrium with adenomyosis (uninvolved by carcinoma) and leiomyomata.  - Cervix with nabothian cysts and tunnel clusters; uninvolved by carcinoma.  - Bilateral fallopian tubes with paratubal cysts.  - Bilateral ovaries with cortical inclusion cysts.     A. PELVIC WASHING                No malignant cells identified    ENDOMETRIUM   8th Edition - Protocol posted:  12/13/2023ENDOMETRIUM - All Specimens  SPECIMEN   Procedure Total hysterectomy and bilateral salpingo-oophorectomy   Hysterectomy Type Laparoscopic, robotic-assisted   Specimen Integrity Intact   TUMOR   Tumor Site Endometrium   Tumor Size Greatest Dimension (Centimeters): 3 cm   Additional Dimension (Centimeters) 1.9 cm    1 cm   Histologic Type Endometrioid carcinoma, NOS   Histologic Grade FIGO grade 2   Two-Tier Grading System Low grade (encompassing FIGO 1 and 2)   Myometrial Invasion Present   Depth of Myometrial Invasion 4 mm   Myometrial Thickness 19 mm   Percentage of Myometrial Invasion 21 %   Adenomyosis Present, uninvolved by carcinoma   Uterine Serosa Involvement Not identified   Lower Uterine Segment Involvement Not identified   Cervical Stromal Involvement Not identified   Other Tissue / Organ Involvement Not identified   The International System for Reporting Serous Fluid Cytopathology Negative for malignancy (NFM)   Lymphatic and / or Vascular Invasion Not identified   MARGINS   Margin Status All margins negative for invasive carcinoma   REGIONAL LYMPH NODES   Regional Lymph Node Status All regional lymph nodes negative for tumor cells   Lymph Nodes Examined    Total Number of Pelvic Nodes Examined 2   Number of Pelvic [place] Nodes Examined 2   Total Number of Para-aortic Nodes Examined 0   pTNM CLASSIFICATION (AJCC 8th Edition)   Reporting of pT, pN, and (when applicable) pM categories is based on information available to the pathologist at the time the report is issued. As per the AJCC (Chapter 1, 8th Ed.) it is the managing physician’s responsibility to establish the final pathologic stage based upon all pertinent information, including but potentially not limited to this pathology report.   pT Category pT1a   pN Category pN0   FIGO STAGE   FIGO Stage IA2       Performance Status:  Symptomatic; in bed >50% of the day    Assessment/Plan     Oncology History   Malignant neoplasm of overlapping  sites of right female breast   7/1/2024 Cancer Staged    Staging form: Breast, AJCC 8th Edition, Clinical stage from 7/1/2024: Stage IB (cT1, cN0(f), cM0, G3, ER-, NE-, HER2-) - Signed by Mabel Crenshaw MD on 9/3/2024     7/1/2024 Cancer Staged    Staging form: Breast, AJCC 8th Edition, Clinical stage from 7/1/2024: Stage IA (cT1, cN0(f), cM0, G1, ER+, NE+, HER2-) - Signed by Mabel Crenshaw MD on 9/3/2024     7/16/2024 Initial Diagnosis    Malignant neoplasm of overlapping sites of right female breast (Multi)     8/14/2024 Cancer Staged    Staging form: Breast, AJCC 8th Edition, Pathologic stage from 8/14/2024: Stage IIIA (pT2(2), pN1mi(sn), cM0, G3, ER-, NE-, HER2-) - Signed by Kacie Simms MD on 8/29/2024     Endometrial cancer (Multi)   9/26/2024 Initial Diagnosis    Endometrial cancer (Multi)     1/8/2025 Cancer Staged    Staging form: Corpus Uteri - Carcinoma and Carcinosarcoma, AJCC 8th Edition and FIGO 2023, Pathologic stage from 1/8/2025: FIGO Stage IA2, calculated as Stage IA (pT1a, pN0(sn), cM0, POLE: Not Assessed, MMRd-, p53: Not Assessed) - Signed by Bib Buitrago MD on 2/3/2025        Diagnoses and all orders for this visit:  Endometrial cancer (Multi)  S/P hysterectomy with oophorectomy  - Surgical pathology reviewed with patient; stage IA2 by revised FIGO staging - pMMR status noted with HIR by GOG 99 criteria. She was counseled given her ongoing frail state, difficulty with recovery from prior breast RT and generalized deconditioning, lack of overall survival benefit I am concerned about her ability to complete brachytherapy as prescribed.  - Discussed need for ongoing disease-specific surveillance as it pertains to endometrial cancer recurrence  - Signs and symptoms of disease recurrence were reviewed and all questions answered. We discussed the most common site for endometrial cancer recurrence includes abdomen and pelvis with additional risks of distant metastasis in lungs or liver. Symptoms for  recurrent including: changes in bowel or bladder habits, abnormal vaginal discharge, and irregular bleeding.   - The following surveillance regimen is recommended based on NCCN and SGO guidelines and modified as necessary based on individual patient circumstances:            Surveillance Test Year 1 and 2  Year 3             Year 4 and 5            Physical Exam   3 months  6 months    6 months            Imaging   as needed  - NP clinic for surveillance pending further evalution for dizziness/SOB, PCP follow up pending  Dizziness  Generalized weakness  - CBC, BMP to be collected in office today  - Ongoing home PO antihypertensive regimen advised to be held in setting of persistent dizziness pending repeat labs  - CT Angio PE protocol due to risk factors for VTE in postoperative setting, recent COVID PNA  -     CT angio chest for pulmonary embolism; Future  - Patient declines home PT; continue to address in setting of ongoing generalized deconditioning     Treatment Plans       No treatment plans exist          Greater than 60min spent in patient discussion, evaluation and coordination of care    Mei Black MD

## 2025-02-06 NOTE — TELEPHONE ENCOUNTER
Call to pt and provided information for appt for CT scan tomorrow at [facility] in [place], 11:15am. She verbalized understanding of instructions and will attend appt.

## 2025-02-07 ENCOUNTER — TELEPHONE (OUTPATIENT)
Dept: GYNECOLOGIC ONCOLOGY | Facility: HOSPITAL | Age: 75
End: 2025-02-07
Payer: MEDICARE

## 2025-02-07 ENCOUNTER — HOSPITAL ENCOUNTER (OUTPATIENT)
Dept: RADIOLOGY | Facility: HOSPITAL | Age: 75
End: 2025-02-07
Payer: MEDICARE

## 2025-02-07 ENCOUNTER — APPOINTMENT (OUTPATIENT)
Dept: HEMATOLOGY/ONCOLOGY | Facility: CLINIC | Age: 75
End: 2025-02-07
Payer: MEDICARE

## 2025-02-07 DIAGNOSIS — C54.1 ENDOMETRIAL CANCER (MULTI): Primary | ICD-10-CM

## 2025-02-07 DIAGNOSIS — R42 DIZZINESS: ICD-10-CM

## 2025-02-07 DIAGNOSIS — C54.1 ENDOMETRIAL CANCER (MULTI): ICD-10-CM

## 2025-02-07 RX ORDER — HEPARIN 100 UNIT/ML
500 SYRINGE INTRAVENOUS AS NEEDED
OUTPATIENT
Start: 2025-02-07

## 2025-02-07 RX ORDER — SODIUM CHLORIDE 9 MG/ML
1000 INJECTION, SOLUTION INTRAVENOUS ONCE
OUTPATIENT
Start: 2025-02-07

## 2025-02-07 RX ORDER — HEPARIN SODIUM,PORCINE/PF 10 UNIT/ML
50 SYRINGE (ML) INTRAVENOUS AS NEEDED
OUTPATIENT
Start: 2025-02-07

## 2025-02-07 RX ORDER — ALBUTEROL SULFATE 0.83 MG/ML
3 SOLUTION RESPIRATORY (INHALATION) AS NEEDED
OUTPATIENT
Start: 2025-02-07

## 2025-02-07 RX ORDER — FAMOTIDINE 10 MG/ML
20 INJECTION INTRAVENOUS ONCE AS NEEDED
OUTPATIENT
Start: 2025-02-07

## 2025-02-07 RX ORDER — EPINEPHRINE 0.3 MG/.3ML
0.3 INJECTION SUBCUTANEOUS EVERY 5 MIN PRN
OUTPATIENT
Start: 2025-02-07

## 2025-02-07 RX ORDER — DIPHENHYDRAMINE HYDROCHLORIDE 50 MG/ML
50 INJECTION INTRAMUSCULAR; INTRAVENOUS AS NEEDED
OUTPATIENT
Start: 2025-02-07

## 2025-02-07 NOTE — TELEPHONE ENCOUNTER
Phoned patient to notify that Dr. Black recommends she received 1 liter of IV hydration following CT scan due to low GFR.   Patient verbalized her understanding of information given.    Notified patient that this RN would contact her with hydration appointment location information once IV hydration appointment has been scheduled     1042    Phoned patient to notify that hydration appointment  has been scheduled for today following her CT scan at [facility] location.     Patient states she does not feel up to going to 2 different locations today for CT and hydration and states her brother who was going to drive her has left as she told him she did not want to proceed with CT or hydration appointments today.  Notified patient that CT appointment is available at [facility] and therefore ct and hydration could be done at the same location.   Patient continued to decline appointments and requests to reschedule for next week.   Advised patient to proceed to ER with worsening symptoms: SOB, weakness/dizziness.    Patient verbalized her understanding of information given.    Updated Dr Black per chat message.     1613   Phoned patient to notify that CT is scheduled for 2/11/25 at [address] at 1015 and she is scheduled for CT pre hydration at [facility] at 0900 on 2/11.    Patient verbalized her understanding of information given and in agreement with appointment times and date.     Updated Dr. Black per chat message.

## 2025-02-10 ENCOUNTER — APPOINTMENT (OUTPATIENT)
Dept: RADIOLOGY | Facility: CLINIC | Age: 75
End: 2025-02-10
Payer: MEDICARE

## 2025-02-10 ENCOUNTER — TELEPHONE (OUTPATIENT)
Dept: HEMATOLOGY/ONCOLOGY | Facility: CLINIC | Age: 75
End: 2025-02-10
Payer: MEDICARE

## 2025-02-10 NOTE — TELEPHONE ENCOUNTER
Pt scheduled for hydration at 9am 2/11 at [address] [facility] and 10am for CT at [address] [facility].  Called and spoke with Carlos in [place], states he will be able to fit patient in for CT chest after hydration.  Call placed to patient, advised her appointment would be changed so she can keep hydration IV and does not need to drive down the road for CT.  Pt agrees with plan, will be at [place] at 9am for pre CT hydration.

## 2025-02-11 ENCOUNTER — INFUSION (OUTPATIENT)
Dept: HEMATOLOGY/ONCOLOGY | Facility: CLINIC | Age: 75
End: 2025-02-11
Payer: MEDICARE

## 2025-02-11 ENCOUNTER — HOSPITAL ENCOUNTER (OUTPATIENT)
Dept: RADIOLOGY | Facility: CLINIC | Age: 75
End: 2025-02-11
Payer: MEDICARE

## 2025-02-11 ENCOUNTER — HOSPITAL ENCOUNTER (OUTPATIENT)
Dept: RADIOLOGY | Facility: CLINIC | Age: 75
Discharge: HOME | End: 2025-02-11
Payer: MEDICARE

## 2025-02-11 VITALS
SYSTOLIC BLOOD PRESSURE: 148 MMHG | TEMPERATURE: 96.8 F | WEIGHT: 230.27 LBS | OXYGEN SATURATION: 97 % | RESPIRATION RATE: 18 BRPM | BODY MASS INDEX: 42.12 KG/M2 | HEART RATE: 68 BPM | DIASTOLIC BLOOD PRESSURE: 79 MMHG

## 2025-02-11 DIAGNOSIS — R42 DIZZINESS: ICD-10-CM

## 2025-02-11 DIAGNOSIS — C54.1 ENDOMETRIAL CANCER (MULTI): ICD-10-CM

## 2025-02-11 PROCEDURE — 2500000004 HC RX 250 GENERAL PHARMACY W/ HCPCS (ALT 636 FOR OP/ED): Performed by: STUDENT IN AN ORGANIZED HEALTH CARE EDUCATION/TRAINING PROGRAM

## 2025-02-11 PROCEDURE — 96360 HYDRATION IV INFUSION INIT: CPT

## 2025-02-11 PROCEDURE — 2550000001 HC RX 255 CONTRASTS: Performed by: STUDENT IN AN ORGANIZED HEALTH CARE EDUCATION/TRAINING PROGRAM

## 2025-02-11 PROCEDURE — 96361 HYDRATE IV INFUSION ADD-ON: CPT | Mod: INF

## 2025-02-11 PROCEDURE — 71275 CT ANGIOGRAPHY CHEST: CPT

## 2025-02-11 RX ORDER — SODIUM CHLORIDE 9 MG/ML
1000 INJECTION, SOLUTION INTRAVENOUS ONCE
Status: COMPLETED | OUTPATIENT
Start: 2025-02-11 | End: 2025-02-11

## 2025-02-11 RX ORDER — FAMOTIDINE 10 MG/ML
20 INJECTION INTRAVENOUS ONCE AS NEEDED
OUTPATIENT
Start: 2025-02-11

## 2025-02-11 RX ORDER — DIPHENHYDRAMINE HYDROCHLORIDE 50 MG/ML
50 INJECTION INTRAMUSCULAR; INTRAVENOUS AS NEEDED
Status: DISCONTINUED | OUTPATIENT
Start: 2025-02-11 | End: 2025-02-11 | Stop reason: HOSPADM

## 2025-02-11 RX ORDER — ALBUTEROL SULFATE 0.83 MG/ML
3 SOLUTION RESPIRATORY (INHALATION) AS NEEDED
Status: DISCONTINUED | OUTPATIENT
Start: 2025-02-11 | End: 2025-02-11 | Stop reason: HOSPADM

## 2025-02-11 RX ORDER — EPINEPHRINE 0.3 MG/.3ML
0.3 INJECTION SUBCUTANEOUS EVERY 5 MIN PRN
OUTPATIENT
Start: 2025-02-11

## 2025-02-11 RX ORDER — FAMOTIDINE 10 MG/ML
20 INJECTION INTRAVENOUS ONCE AS NEEDED
Status: DISCONTINUED | OUTPATIENT
Start: 2025-02-11 | End: 2025-02-11 | Stop reason: HOSPADM

## 2025-02-11 RX ORDER — EPINEPHRINE 0.3 MG/.3ML
0.3 INJECTION SUBCUTANEOUS EVERY 5 MIN PRN
Status: DISCONTINUED | OUTPATIENT
Start: 2025-02-11 | End: 2025-02-11 | Stop reason: HOSPADM

## 2025-02-11 RX ORDER — ALBUTEROL SULFATE 0.83 MG/ML
3 SOLUTION RESPIRATORY (INHALATION) AS NEEDED
OUTPATIENT
Start: 2025-02-11

## 2025-02-11 RX ORDER — DIPHENHYDRAMINE HYDROCHLORIDE 50 MG/ML
50 INJECTION INTRAMUSCULAR; INTRAVENOUS AS NEEDED
OUTPATIENT
Start: 2025-02-11

## 2025-02-11 RX ORDER — SODIUM CHLORIDE 9 MG/ML
1000 INJECTION, SOLUTION INTRAVENOUS ONCE
Status: CANCELLED | OUTPATIENT
Start: 2025-02-11

## 2025-02-11 RX ADMIN — IOHEXOL 50 ML: 350 INJECTION, SOLUTION INTRAVENOUS at 11:22

## 2025-02-11 RX ADMIN — SODIUM CHLORIDE 1000 ML/HR: 9 INJECTION, SOLUTION INTRAVENOUS at 09:23

## 2025-02-11 ASSESSMENT — PAIN SCALES - GENERAL: PAINLEVEL_OUTOF10: 0-NO PAIN

## 2025-02-17 NOTE — PROGRESS NOTES
Subjective   Reason for Visit: Dina Mullins is an 74 y.o. female here for a hospital follow up.    HPI  Patient Care Team:  Ken Saleh PA-C as PCP - General (Internal Medicine)  Maine Birch MD as Consulting Physician (Hematology and Oncology)  Sherron Taylor RN as Registered Nurse (Hematology and Oncology)  Mei Black MD as Consulting Physician (Obstetrics and Gynecology)     Hospital follow up:  Admitted from 1/8-1/11.  Discharge note reviewed.  Was admitted for observation after hysterectomy for endometrial adenocarcinoma.  Seeing GYN/onc.    Left arm pain:  Seeing ortho.  I gave Robaxin previously.  Was found to have moderate OA in her arm.  Symptoms x a couple weeks.  No acute injury.  Difficulty with raising her arm.  No elbow/wrist/neck pains.  Mostly in the shoulder area.    Breast cancer/endometrial cancer:  Had hysterectomy in Jan 2025.  Had a lumpectomy in Aug 2024.  Was found to be invasive ductal carcinoma in July 2024.  Following with surgical oncology.  Screening and diagnostic mammograms were abnormal in May 2024.    HTN/A-fib:  Taking Lasix, Xarelto.  Seeing cardiology.  BP today is 122/80.  Denies headaches.    Diabetes:  Taking Lantus 25 units daily, Rybelsus.  Last A1C was 5.8 in Aug 2024.  POC today is 6.2.    Dizziness/Tinnitus/BPPV:  I referred to ENT and vestibular therapy previously.  She has never seen vestibular therapy.  She had recently been started on Dopamine by neurology when this started.  Taking Meclizine, but this no longer helps.  She did have a normal brain MRI in Feb 2025.  She does see neurology.  Does not follow with ENT - states they were worthless and just told her to hold her head straight for 3 days.  Causing everyday struggles right now.    HLD:  Taking Atorvastatin.  Last checked Sep 2024.    Anxiety/Depression:  Taking Cymbalta.  Denies SI/HI.    Parkinson's:  Taking Dopamine now.  Seeing neurology.    Stage III CKD:  Creatinine is stable in Feb  "2025.    Health maintenance:  Smoking: Never a smoker.  Mammogram (40-75): Abnormal screening/diagnostic mammo in April 2024. Following with surgical oncology.  Labs: Feb 2025.  DEXA (65+, q 2 years): Dec 2021.  Prevnar 13 (65+): 2015  Pneumovax 23 (65+, 1 year after Prev 13): 2020  Influenza:  Zostavax (60+, 1 time, at pharmacy):    Review of Systems  12 point review of systems negative unless stated above in HPI    Objective   Vitals:  /80   Pulse 83   Resp 16   Ht 1.575 m (5' 2\")   Wt 104 kg (230 lb)   SpO2 98%   BMI 42.07 kg/m²       Physical Exam  General: Alert and oriented, well nourished, no acute distress.  Lungs: Clear to auscultation, non-labored respiration.  Heart: Normal rate, regular rhythm, no murmur, gallop or edema.  Neurologic: Awake, alert, and oriented X3, CN II-XII intact.  Psychiatric: Cooperative, appropriate mood and affect.    Assessment/Plan   It was good seeing you!  I am sorry about the balance concerns!  I have placed a referral to PT for strengthening exercises.  I have also placed a referral for vestibular therapy.  The balance/dizziness does not appear to be strictly neurological.  I think and inner ear issue is most likely.  Consider another ENT.  Continue specialty care.  If symptoms persist or worsen despite current plan of care, please contact your healthcare provider for further evaluation.  Patient instructed to contact the office if there are any questions regarding their care or treatment.   [facility] [phone]  Continue the same medications.  Chronic conditions are stable.  Call with questions or concerns.    Follow up  3-4 months for A1C  Problem List Items Addressed This Visit          Cardiac and Vasculature    Benign essential HTN    Hyperlipidemia    Paroxysmal atrial fibrillation (Multi)    Atrial fibrillation (Multi)       ENT    Benign paroxysmal positional vertigo due to bilateral vestibular disorder    Relevant Orders    Referral " to Physical Therapy       Endocrine/Metabolic    Type 2 diabetes mellitus with obesity (Multi)    Relevant Orders    POCT glycosylated hemoglobin (Hb A1C) manually resulted (Completed)       Genitourinary and Reproductive    Post-menopausal bleeding - Primary    Status post hysterectomy       Hematology and Neoplasia    Endometrial cancer (Multi)    Malignant neoplasm of overlapping sites of right female breast, unspecified estrogen receptor status       Mental Health    Mixed anxiety and depressive disorder       Neuro    Parkinsonism (Multi)       Symptoms and Signs    Dizziness    Relevant Orders    Referral to Physical Therapy    Weakness    Relevant Orders    Referral to Physical Therapy     Other Visit Diagnoses       BMI 40.0-44.9, adult (Multi)        Obesity, Class III, BMI 40-49.9 (morbid obesity) (Multi)

## 2025-02-19 ENCOUNTER — HOSPITAL ENCOUNTER (OUTPATIENT)
Dept: RADIOLOGY | Facility: CLINIC | Age: 75
Discharge: HOME | End: 2025-02-19
Payer: MEDICARE

## 2025-02-19 DIAGNOSIS — C54.1 ENDOMETRIAL ADENOCARCINOMA (MULTI): ICD-10-CM

## 2025-02-19 DIAGNOSIS — C50.811 MALIGNANT NEOPLASM OF OVERLAPPING SITES OF RIGHT FEMALE BREAST, UNSPECIFIED ESTROGEN RECEPTOR STATUS: ICD-10-CM

## 2025-02-19 PROCEDURE — A9575 INJ GADOTERATE MEGLUMI 0.1ML: HCPCS | Performed by: STUDENT IN AN ORGANIZED HEALTH CARE EDUCATION/TRAINING PROGRAM

## 2025-02-19 PROCEDURE — 2550000001 HC RX 255 CONTRASTS: Performed by: STUDENT IN AN ORGANIZED HEALTH CARE EDUCATION/TRAINING PROGRAM

## 2025-02-19 PROCEDURE — 70553 MRI BRAIN STEM W/O & W/DYE: CPT

## 2025-02-19 RX ORDER — GADOTERATE MEGLUMINE 376.9 MG/ML
20 INJECTION INTRAVENOUS
Status: COMPLETED | OUTPATIENT
Start: 2025-02-19 | End: 2025-02-19

## 2025-02-19 RX ADMIN — GADOTERATE MEGLUMINE 20 ML: 376.9 INJECTION INTRAVENOUS at 12:05

## 2025-02-21 ENCOUNTER — TELEPHONE (OUTPATIENT)
Dept: CARDIOLOGY | Facility: CLINIC | Age: 75
End: 2025-02-21
Payer: MEDICARE

## 2025-02-21 DIAGNOSIS — I48.11 LONGSTANDING PERSISTENT ATRIAL FIBRILLATION (MULTI): ICD-10-CM

## 2025-02-24 ENCOUNTER — APPOINTMENT (OUTPATIENT)
Dept: PRIMARY CARE | Facility: CLINIC | Age: 75
End: 2025-02-24
Payer: MEDICARE

## 2025-02-25 ENCOUNTER — OFFICE VISIT (OUTPATIENT)
Dept: PRIMARY CARE | Facility: CLINIC | Age: 75
End: 2025-02-25
Payer: MEDICARE

## 2025-02-25 VITALS
SYSTOLIC BLOOD PRESSURE: 122 MMHG | BODY MASS INDEX: 42.33 KG/M2 | RESPIRATION RATE: 16 BRPM | WEIGHT: 230 LBS | OXYGEN SATURATION: 98 % | DIASTOLIC BLOOD PRESSURE: 80 MMHG | HEART RATE: 83 BPM | HEIGHT: 62 IN

## 2025-02-25 DIAGNOSIS — F41.8 MIXED ANXIETY AND DEPRESSIVE DISORDER: ICD-10-CM

## 2025-02-25 DIAGNOSIS — I48.11 LONGSTANDING PERSISTENT ATRIAL FIBRILLATION (MULTI): ICD-10-CM

## 2025-02-25 DIAGNOSIS — Z90.710 STATUS POST HYSTERECTOMY: ICD-10-CM

## 2025-02-25 DIAGNOSIS — E66.01 OBESITY, CLASS III, BMI 40-49.9 (MORBID OBESITY) (MULTI): ICD-10-CM

## 2025-02-25 DIAGNOSIS — C54.1 ENDOMETRIAL CANCER (MULTI): ICD-10-CM

## 2025-02-25 DIAGNOSIS — E11.69 TYPE 2 DIABETES MELLITUS WITH OBESITY (MULTI): ICD-10-CM

## 2025-02-25 DIAGNOSIS — I48.0 PAROXYSMAL ATRIAL FIBRILLATION (MULTI): ICD-10-CM

## 2025-02-25 DIAGNOSIS — I10 BENIGN ESSENTIAL HTN: ICD-10-CM

## 2025-02-25 DIAGNOSIS — N95.0 POST-MENOPAUSAL BLEEDING: Primary | ICD-10-CM

## 2025-02-25 DIAGNOSIS — R53.1 WEAKNESS: ICD-10-CM

## 2025-02-25 DIAGNOSIS — E66.9 TYPE 2 DIABETES MELLITUS WITH OBESITY (MULTI): ICD-10-CM

## 2025-02-25 DIAGNOSIS — H81.13 BENIGN PAROXYSMAL POSITIONAL VERTIGO DUE TO BILATERAL VESTIBULAR DISORDER: ICD-10-CM

## 2025-02-25 DIAGNOSIS — G20.A1 PARKINSON'S DISEASE, UNSPECIFIED WHETHER DYSKINESIA PRESENT, UNSPECIFIED WHETHER MANIFESTATIONS FLUCTUATE: ICD-10-CM

## 2025-02-25 DIAGNOSIS — R42 DIZZINESS: ICD-10-CM

## 2025-02-25 DIAGNOSIS — C50.811 MALIGNANT NEOPLASM OF OVERLAPPING SITES OF RIGHT FEMALE BREAST, UNSPECIFIED ESTROGEN RECEPTOR STATUS: ICD-10-CM

## 2025-02-25 DIAGNOSIS — E78.00 PURE HYPERCHOLESTEROLEMIA: ICD-10-CM

## 2025-02-25 LAB — POC HEMOGLOBIN A1C: 6.2 % (ref 4.2–6.5)

## 2025-02-25 PROCEDURE — 1125F AMNT PAIN NOTED PAIN PRSNT: CPT | Performed by: PHYSICIAN ASSISTANT

## 2025-02-25 PROCEDURE — 3074F SYST BP LT 130 MM HG: CPT | Performed by: PHYSICIAN ASSISTANT

## 2025-02-25 PROCEDURE — 1036F TOBACCO NON-USER: CPT | Performed by: PHYSICIAN ASSISTANT

## 2025-02-25 PROCEDURE — G2211 COMPLEX E/M VISIT ADD ON: HCPCS | Performed by: PHYSICIAN ASSISTANT

## 2025-02-25 PROCEDURE — 99214 OFFICE O/P EST MOD 30 MIN: CPT | Performed by: PHYSICIAN ASSISTANT

## 2025-02-25 PROCEDURE — 1157F ADVNC CARE PLAN IN RCRD: CPT | Performed by: PHYSICIAN ASSISTANT

## 2025-02-25 PROCEDURE — 3079F DIAST BP 80-89 MM HG: CPT | Performed by: PHYSICIAN ASSISTANT

## 2025-02-25 PROCEDURE — 83036 HEMOGLOBIN GLYCOSYLATED A1C: CPT | Performed by: PHYSICIAN ASSISTANT

## 2025-02-25 PROCEDURE — 1159F MED LIST DOCD IN RCRD: CPT | Performed by: PHYSICIAN ASSISTANT

## 2025-02-25 PROCEDURE — 1160F RVW MEDS BY RX/DR IN RCRD: CPT | Performed by: PHYSICIAN ASSISTANT

## 2025-02-25 ASSESSMENT — PATIENT HEALTH QUESTIONNAIRE - PHQ9
1. LITTLE INTEREST OR PLEASURE IN DOING THINGS: NOT AT ALL
SUM OF ALL RESPONSES TO PHQ9 QUESTIONS 1 AND 2: 0
2. FEELING DOWN, DEPRESSED OR HOPELESS: NOT AT ALL

## 2025-02-25 ASSESSMENT — ENCOUNTER SYMPTOMS
LOSS OF SENSATION IN FEET: 0
DEPRESSION: 0
OCCASIONAL FEELINGS OF UNSTEADINESS: 1

## 2025-02-25 ASSESSMENT — PAIN SCALES - GENERAL: PAINLEVEL_OUTOF10: 3

## 2025-02-26 ENCOUNTER — APPOINTMENT (OUTPATIENT)
Dept: HEMATOLOGY/ONCOLOGY | Facility: CLINIC | Age: 75
End: 2025-02-26
Payer: MEDICARE

## 2025-03-07 ENCOUNTER — HOSPITAL ENCOUNTER (OUTPATIENT)
Dept: RADIATION ONCOLOGY | Facility: CLINIC | Age: 75
Setting detail: RADIATION/ONCOLOGY SERIES
Discharge: HOME | End: 2025-03-07
Payer: MEDICARE

## 2025-03-07 ENCOUNTER — APPOINTMENT (OUTPATIENT)
Dept: RADIATION ONCOLOGY | Facility: CLINIC | Age: 75
End: 2025-03-07
Payer: MEDICARE

## 2025-03-07 VITALS
WEIGHT: 229.28 LBS | BODY MASS INDEX: 41.94 KG/M2 | TEMPERATURE: 97.9 F | RESPIRATION RATE: 18 BRPM | DIASTOLIC BLOOD PRESSURE: 76 MMHG | SYSTOLIC BLOOD PRESSURE: 120 MMHG | HEART RATE: 74 BPM | OXYGEN SATURATION: 98 %

## 2025-03-07 DIAGNOSIS — R42 DIZZINESS: ICD-10-CM

## 2025-03-07 DIAGNOSIS — C54.1 ENDOMETRIAL ADENOCARCINOMA (MULTI): ICD-10-CM

## 2025-03-07 DIAGNOSIS — C50.811 MALIGNANT NEOPLASM OF OVERLAPPING SITES OF RIGHT FEMALE BREAST, UNSPECIFIED ESTROGEN RECEPTOR STATUS: ICD-10-CM

## 2025-03-07 DIAGNOSIS — C50.112 MALIGNANT NEOPLASM OF CENTRAL PORTION OF LEFT BREAST IN FEMALE, ESTROGEN RECEPTOR POSITIVE: Primary | ICD-10-CM

## 2025-03-07 DIAGNOSIS — Z17.0 MALIGNANT NEOPLASM OF CENTRAL PORTION OF LEFT BREAST IN FEMALE, ESTROGEN RECEPTOR POSITIVE: Primary | ICD-10-CM

## 2025-03-07 PROCEDURE — 99214 OFFICE O/P EST MOD 30 MIN: CPT | Performed by: STUDENT IN AN ORGANIZED HEALTH CARE EDUCATION/TRAINING PROGRAM

## 2025-03-07 PROCEDURE — G2211 COMPLEX E/M VISIT ADD ON: HCPCS | Performed by: STUDENT IN AN ORGANIZED HEALTH CARE EDUCATION/TRAINING PROGRAM

## 2025-03-07 RX ORDER — MECLIZINE HYDROCHLORIDE 25 MG/1
25 TABLET ORAL 3 TIMES DAILY PRN
Qty: 90 TABLET | Refills: 2 | Status: SHIPPED | OUTPATIENT
Start: 2025-03-07

## 2025-03-07 ASSESSMENT — ENCOUNTER SYMPTOMS
OCCASIONAL FEELINGS OF UNSTEADINESS: 1
LOSS OF SENSATION IN FEET: 1
FEVER: 0
CHILLS: 0
DEPRESSION: 0
SHORTNESS OF BREATH: 0

## 2025-03-07 ASSESSMENT — PAIN SCALES - GENERAL: PAINLEVEL_OUTOF10: 0-NO PAIN

## 2025-03-07 NOTE — PROGRESS NOTES
Radiation Oncology Follow-Up    Patient Name:  Dina Mullins  MRN:  [id number]  :  1950    Referring Provider: Bib Buitrago MD  Primary Care Provider: Ken Saleh PA-C  Care Team: Patient Care Team:  Ken Saleh PA-C as PCP - General (Internal Medicine)  Maine Birch MD as Consulting Physician (Hematology and Oncology)  Sherron Taylor RN as Registered Nurse (Hematology and Oncology)  Mei Black MD as Consulting Physician (Obstetrics and Gynecology)    Date of Service: 3/7/2025    SUBJECTIVE  History of Present Illness:  Dina Mullins is a 74 y.o. female who was previously seen at the [facility] Department of Radiation Oncology for node positive, right breast cancer.      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  All margins negative for invasive carcinoma with 1.8 mm from the anterior/medial margin, DCIS is less than 1 mm from the anterior/medial margin, status post partial mastectomy and sentinel lymph node biopsy, status post adjuvant radiation therapy  #) Right breast, invasive ductal carcinoma at 12:00, pT1c pN1mi (sn), ER positive greater than 95%, [place] +85%, HER2 equivocal (2+)/dual-kanika negative (1.4), 1.4 cm, G1, indeterminate LVI, all margins negative for invasive carcinoma with less than 1 mm from the posterior margin and 1.6 mm from the anterior margin and 2.1 mm from the medial margin, status post partial mastectomy and sentinel lymph node biopsy, status post adjuvant radiation therapy  #) FIGO grade 2 endometrioid type adenocarcinoma, status post total abdominal hysterectomy/bilateral salpingo-oophorectomy     Ms. Burgos is a postmenopausal female with a prior history of a normal mammogram in  that  presented on 4/17/2024 with a bilateral screening mammogram with tomosynthesis showing a lesion in the central right breast at middle depth.  The patient denies any palpable breast mass, nipple discharge or breast skin changes.  The patient was evaluated further with right diagnostic mammogram with tomosynthesis on 5/9/2024 which revealed at middle depth in the right breast at 12:00, 9 cm from the nipple a 1 cm spiculated mass.  On ultrasound at 12:00, 9-1/2 cm from the nipple measured a 0.8 x 0.8 x 1.1 cm mass with a lymph node in level 1 measuring 0.8 x 1.1 x 1.8 cm with cortical thickening up to 4 mm.  The patient underwent ultrasound-guided core biopsy on 7/1/2024 to an additional lesion seen at 11:00, 8 cm from the nipple which showed invasive ductal carcinoma, grade 3, ER negative, RI negative, HER2 negative (1+), originally seen right breast 12:00, 9 cm from the nipple lesion showed invasive ductal carcinoma, grade 1 with microcalcifications, ER positive greater than 95%, RI +85%, HER2 equivocal (2+)/dual-kanika negative (1.4) and right axillary lymph node biopsy was negative for carcinoma showing reactive changes.       The patient underwent right breast lumpectomy at 11:00 and 12:00 as well as right sentinel lymph node biopsy on 8/14/2024.  Pathology revealed in the 11:00 lumpectomy cavity, poorly differentiated carcinoma with squamous differential (metaplastic carcinoma) measuring 2.4 cm, grade 3, with ductal carcinoma in situ with solid subtype, grade 3 and all margins negative for invasive carcinoma and DCIS less than 1 mm from the anterior/medial margin.  Pathology revealed in the 12:00 lumpectomy specimen invasive ductal carcinoma measuring 1.4 cm, grade 1, indeterminate LVI, all margins negative for invasive carcinoma with less than 1 mm from the posterior margin and 1.6 mm from the anterior margin.  [place] lymph node biopsy revealed 1/2 lymph nodes involved by micrometastatic low-grade carcinoma  consistent with the hormone positive, low-grade primary.    The patient completed adjuvant breast radiation therapy    On 2024, the patient was recently found to have FIGO grade 2 endometrial adenocarcinoma, the patient will be planned for hysterectomy and sentinel lymph node sampling in the upcoming weeks.  The patient has met with medical oncology and was deemed a nonoptimal candidate for adjuvant chemotherapy.  The patient underwent total abdominal hysterectomy and salpingo-oophorectomy on 2024 which revealed endometrial carcinoma, endometrioid type, FIGO grade 2, with less than 50% myometrial invasion.  3 cm focus of FIGO grade 2, endometrioid type adenocarcinoma with 4/19 mm (21%) myometrial invasion, no involvement of cervical stromal, no evidence of lower uterine segment involvement or no evidence of lymphovascular invasion.    The patient completed 4256 cGy in 16 fractions to the right breast, low axilla and 1000 cGy in 4 fraction to the tumor beds a total dose of 5256 cGy in 20 fractions from 2024-2024.      3/7/2025: Scopolamine patch did not help vertigo symptoms. Improvement slightly with meclizine.        Onset of menses - 11  Onset of menopause - 64  Post-menopausal bleeding - Intermittent bleeding status post hysterectomy for endometrial carcinoma  Estrogen replacement: Denies     Labs:  None     Pathology:  Uterine  2024-A. Left external iliac sentinel lymph node, excision:  - One lymph node, negative for metastatic carcinoma (0/1).  B. Right obturator sentinel lymph node, excision:  - One lymph node, negative for metastatic carcinoma (0/1).  C. Uterus, cervix, bilateral fallopian tubes and ovaries, robot-assisted hysterectomy with bilateral salpingo-oophorectomy:  - Endometrial carcinoma, endometrioid type, FIGO grade 2, with less than 50% myometrial invasion.  3 cm focus of FIGO grade 2, endometrioid type adenocarcinoma with 4/19 mm (21%) myometrial invasion, no  involvement of cervical stromal, no evidence of lower uterine segment involvement or no evidence of lymphovascular invasion  - Myometrium with adenomyosis (uninvolved by carcinoma) and leiomyomata.  - Cervix with nabothian cysts and tunnel clusters; uninvolved by carcinoma.  - Bilateral fallopian tubes with paratubal cysts.  - Bilateral ovaries with cortical inclusion cysts.   A. PELVIC WASHING:  No malignant cells identified  pT1a pN0 cM0    9/26/2024-A.  ENDOMETRIUM, BIOPSY: Endometrial adenocarcinoma, grade 2, Comment: The results of p53 and MMR immunostains will be reported in addenda.   B.  CERVIX, BIOPSY: Fragments of inflamed endocervical mucosa, clinically polyp.    8/14/2024-A. Right breast lumpectomy at 11 o'clock:-- Invasive poorly differentiated carcinoma with squamous differentiation (metaplastic carcinoma) and ductal carcinoma in situ, 2.4 cm, G3 with DCIS of solid subtype, G3, indeterminant lymphovascular invasion.  All margins negative for invasive carcinoma with 1.8 mm from the anterior/medial margin, DCIS is less than 1 mm from the anterior/medial margin.  -- Changes consistent with site of previous biopsy.    pT2 pN0 (sn)   B. Right breast lumpectomy at 12 o'clock: -- Invasive ductal carcinoma, 1.4 cm, G1, indeterminate LVI, all margins negative for invasive carcinoma with less than 1 mm from the posterior margin and 1.6 mm from the anterior margin and 2.1 mm from the medial margin.  -- Changes consistent with site of previous biopsy.    pT1c pN1mi (sn)     C. Right breast sentinel lymph node with magseed, excision: -- One of two lymph nodes involved by micrometastasis, from low-grade carcinoma.   -- Changes consistent with site of previous biopsy.   Note: The largest contiguous focus of tumor involving the lymph node measures 1.6 mm consistent with micrometastasis. Immunohistochemical stains for cytokeratin AE1/3 confirm the presence of tumor in the lymph node. The tumor morphologically appears  lower grade and is estrogen and progesterone receptor strongly and diffusely positive consistent with a metastasis of the lower grade tumor at 12 o'clock. No extranodal extension is identified.      2024-A. Right breast mass, ultrasound-guided core biopsy at 11 o'clock, 8 cm from nipple:--  Invasive ductal carcinoma, grade 3 with necrosis, Note:  In this limited sample, the invasive carcinoma measures up to 0.8 cm in greatest dimension.   B. Right breast mass, ultrasound-guided core biopsy at 12 o'clock, 9 cm from nipple:  -- Invasive ductal carcinoma, grade 1 with associated microcalcifications, Note:  In this limited sample, the invasive carcinoma measures up to 9 mm in greatest dimension.  C. Right axillary lymph node, ultrasound-guided biopsy:  -- Portion of lymph node with reactive changes; negative for carcinoma, Note: Immunohistochemical stain for cytokeratin AE1/3 is negative.      Disease Associated Genomics:    Uterine  MMRp: Endometrial carcinoma with normal mismatch repair protein expression.  MLH-1 : POSITIVE                                        PMS-2 : POSITIVE                                        MSH-2 : POSITIVE                                      MSH-6 : POSITIVE           Breast  Right breast 11:00: ER negative, [place] negative, HER2 negative (1+)  Right breast 12:00: ER positive greater than 95%, [place] +85%, HER2 equivocal (2+)/dual-kanika negative (1.4)      Imagin2025-MRI brain with and without contrast: Microangiopathy changes subcortical and periventricular regions.  No evidence of intracranial enhancing masses.    2024-CT angiography of the chest: Scattered calcified left hilar granulomas.  Lung parenchyma demonstrating a pulmonary nodule in the right middle lobe measuring 3 mm.  Scattered granulomas of the liver and spleen.  Generalized edema within the right breast incompletely visualized.    2024-PET/CT: No evidence of cervical lymphadenopathy.  Postprocedural changes in  the right breast with avid lesion max SUV 2.8 in the superior lateral quadrant, subcentimeter pulmonary calcified nodule in the left lower lobe.  Avid right axillary lymph node with a max SUV 3.8.  No evidence of avid mediastinal or axillary lymphadenopathy.  Uptake of max SUV 7.4 in the endometrial cavity.  No evidence of concerning osseous lesions.    9/13/2024-ultrasound pelvis: 8.2 x 3.7 x 4.8 cm uterus with a 1.0 x 1.2 x 1.3 cm solid and hyperechoic submucosal mass and a intramural mass measuring 1.6 x 1.8 x 1.8 cm.  Endometrial thickness measuring 1.1 cm.  Neither ovary able to be visualized.    5/9/2024-right diagnostic mammogram with ultrasound: At middle depth in the right breast at 12:00, 9 cm from the nipple a 1 cm spiculated mass.  On ultrasound at 12:00, 9-1/2 cm from the nipple measured a 0.8 x 0.8 x 1.1 cm mass with a lymph node in level 1 measuring 0.8 x 1.1 x 1.8 cm with cortical thickening up to 4 mm    4/17/2024-bilateral screening mammogram with tomosynthesis:  lesion in the central right breast at middle depth     12/20/2021-bilateral mammogram screening with tomosynthesis: No significant masses, calcifications or other findings seen in either breast.  (BI-RADS 1)     Prior Systemic Therapies:  None     Prior Surgeries:  1/8/2024-robotic assisted total laparoscopic hysterectomy/bilateral salpingo-oophorectomy  8/14/2024-right partial mastectomy x 2 and sentinel lymph node biopsy    Prior Radiation Therapy:  11/22/2024-12/26/2024: 4256 cGy in 16 fractions to the right breast, low axilla and 1000 cGy in 4 fraction to the tumor beds a total dose of 5256 cGy in 20 fractions    Treatment Rendered:   3D CRT: Right Breast with lymph nodes    Treatment Period Technique Fraction Dose Fractions Total Dose   Course 1 11/22/2024-12/26/2024  (days elapsed: 34)         R_Breast_Axilla 11/22/2024-12/19/2024 3D 266 / 266 cGy 16 / 16 4256 / 4,256 cGy         R_TB_boost 12/20/2024-12/26/2024 3D 250 / 250 cGy 4 /  4 1000 / 1,000 cGy       Review of Systems:   Review of Systems   Constitutional:  Negative for chills and fever.   Respiratory:  Negative for shortness of breath.    Cardiovascular:  Negative for chest pain.   Genitourinary:  Negative for vaginal bleeding.        Performance Status:   The Karnofsky performance scale today is 80, Normal activity with effort; some signs or symptoms of disease (ECOG equivalent 1).       OBJECTIVE  Vital Signs:  /76   Pulse 74   Temp 36.6 °C (97.9 °F) (Temporal)   Resp 18   Wt 104 kg (229 lb 4.5 oz)   SpO2 98%   BMI 41.94 kg/m²   Physical Exam  Vitals and nursing note reviewed. Exam conducted with a chaperone present.   HENT:      Head: Normocephalic.   Musculoskeletal:         General: Normal range of motion.      Cervical back: Normal range of motion.   Neurological:      General: No focal deficit present.      Mental Status: She is alert.            ASSESSMENT:   Dina Mullins is a 74 y.o. female with Endometrial cancer (Multi), Pathologic: FIGO Stage IA2, calculated as Stage IA (pT1a, pN0(sn), cM0, POLE: Not Assessed, MMRd-, p53: Not Assessed)    Malignant neoplasm of overlapping sites of right female breast, Clinical: Stage IB (cT1, cN0(f), cM0, G3, ER-, WA-, HER2-)  Malignant neoplasm of overlapping sites of right female breast, Clinical: Stage IA (cT1, cN0(f), cM0, G1, ER+, WA+, HER2-)  Malignant neoplasm of overlapping sites of right female breast, Pathologic: Stage IIIA (pT2(2), pN1mi(sn), cM0, G3, ER-, WA-, HER2-).      Ms. Mullins is a postmenopausal female with 2 primary malignant breast cancers in the right breast, including a early stage, grade 3, poorly differentiated squamous differentiated metaplastic carcinoma, triple negative, and invasive ductal carcinoma, grade 1, ER greater than 95%, WA 85%, HER2 negative, node positive breast cancer.  The patient met with medical oncology and was deemed a not optimal adjuvant chemotherapy candidate.  The  patient completed adjuvant radiation therapy.    The patient was recently diagnosed with endometrioid FIGO stage IA2, pT1a pN0 (sn) cM0, status post TOÑO/BSO and sentinel lymph node biopsy.     The patient will be followed by gynecological oncology, based on patient's clinical and performance factors to favor a surveillance approach. Plan to evaluate next month (3 months post-op).    The patient is to be scheduled with mammogram and to see Dr. Crenshaw next month.  The patient will see medical oncology to discuss AI, not a chemotherapy candidate given performance status.    Follow up in 10/2025 for breast exam.    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  All margins negative for invasive carcinoma with 1.8 mm from the anterior/medial margin, DCIS is less than 1 mm from the anterior/medial margin, status post partial mastectomy and sentinel lymph node biopsy, status post adjuvant radiation therapy  -Following with breast surgery for mammogram, mammogram and follow up orders placed  -Status post 4256 cGy in 16 fractions to the right breast and undissected axilla followed by tumor bed boost 1000 cGy in 4 fractions to a total dose of 5256 cGy in 20 fractions  -Medical oncology deemed a nonoperable candidate for adjuvant chemotherapy based on comorbid risk factors  -Status post partial mastectomy and sentinel lymph node biopsy     #) Right breast, invasive ductal carcinoma at 12:00, pT1c pN1mi  (sn), ER positive greater than 95%, [place] +85%, HER2 equivocal (2+)/dual-kanika negative (1.4), 1.4 cm, G1, indeterminate LVI, all margins negative for invasive carcinoma with less than 1 mm from the posterior margin and 1.6 mm from the anterior margin and 2.1 mm from the medial margin, status post partial mastectomy and sentinel lymph node biopsy, status post adjuvant radiation therapy  -Status post 4256 cGy in 16 fractions to the right breast and undissected axilla followed by tumor bed boost 1000 cGy in 4 fractions to a total dose of 5256 cGy in 20 fractions  -Status post partial mastectomy and sentinel lymph node biopsy     #) Multiple primary breast malignancies  -Patient referred to genetics by breast surgery    #) Acute toxicities  -Radiation dermatitis/hyperpigmentation: Grade 1, recommended [place] smooth  -Dizziness: Ordering MRI brain negative, referred to vestibular therapy     #) Long term side effects    A total of 20 minutes were spent face-to-face with the patient, with an additional 10 minutes spent reviewing records including imaging, pathology and physician notes.    Bib Buitrago MD  [facility]  [facility]  - Department of Radiation Oncology  Phone: [phone]  Fax: [phone]  Epic secure chat preferred / Pager [phone]    Note: This was transcribed using Dragon voice recognition software. Attempts were made to correct any errors; however, errors or omissions may be present.     NCCN Guidelines were applicable to guide this patients treatment plan.

## 2025-03-24 ENCOUNTER — APPOINTMENT (OUTPATIENT)
Dept: PHYSICAL THERAPY | Facility: CLINIC | Age: 75
End: 2025-03-24
Payer: MEDICARE

## 2025-03-27 ENCOUNTER — OFFICE VISIT (OUTPATIENT)
Dept: GYNECOLOGIC ONCOLOGY | Facility: CLINIC | Age: 75
End: 2025-03-27
Payer: MEDICARE

## 2025-03-27 VITALS
BODY MASS INDEX: 42.76 KG/M2 | HEART RATE: 67 BPM | DIASTOLIC BLOOD PRESSURE: 83 MMHG | OXYGEN SATURATION: 97 % | RESPIRATION RATE: 18 BRPM | SYSTOLIC BLOOD PRESSURE: 152 MMHG | TEMPERATURE: 98.6 F | WEIGHT: 233.8 LBS

## 2025-03-27 DIAGNOSIS — R42 DIZZINESS: Primary | ICD-10-CM

## 2025-03-27 DIAGNOSIS — Z17.0 MALIGNANT NEOPLASM OF CENTRAL PORTION OF LEFT BREAST IN FEMALE, ESTROGEN RECEPTOR POSITIVE: ICD-10-CM

## 2025-03-27 DIAGNOSIS — C50.112 MALIGNANT NEOPLASM OF CENTRAL PORTION OF LEFT BREAST IN FEMALE, ESTROGEN RECEPTOR POSITIVE: ICD-10-CM

## 2025-03-27 DIAGNOSIS — R53.1 GENERALIZED WEAKNESS: ICD-10-CM

## 2025-03-27 PROCEDURE — 99214 OFFICE O/P EST MOD 30 MIN: CPT | Performed by: STUDENT IN AN ORGANIZED HEALTH CARE EDUCATION/TRAINING PROGRAM

## 2025-03-27 PROCEDURE — 1159F MED LIST DOCD IN RCRD: CPT | Performed by: STUDENT IN AN ORGANIZED HEALTH CARE EDUCATION/TRAINING PROGRAM

## 2025-03-27 PROCEDURE — 3077F SYST BP >= 140 MM HG: CPT | Performed by: STUDENT IN AN ORGANIZED HEALTH CARE EDUCATION/TRAINING PROGRAM

## 2025-03-27 PROCEDURE — 1126F AMNT PAIN NOTED NONE PRSNT: CPT | Performed by: STUDENT IN AN ORGANIZED HEALTH CARE EDUCATION/TRAINING PROGRAM

## 2025-03-27 PROCEDURE — 1160F RVW MEDS BY RX/DR IN RCRD: CPT | Performed by: STUDENT IN AN ORGANIZED HEALTH CARE EDUCATION/TRAINING PROGRAM

## 2025-03-27 PROCEDURE — 3079F DIAST BP 80-89 MM HG: CPT | Performed by: STUDENT IN AN ORGANIZED HEALTH CARE EDUCATION/TRAINING PROGRAM

## 2025-03-27 PROCEDURE — 1157F ADVNC CARE PLAN IN RCRD: CPT | Performed by: STUDENT IN AN ORGANIZED HEALTH CARE EDUCATION/TRAINING PROGRAM

## 2025-03-27 ASSESSMENT — PAIN SCALES - GENERAL: PAINLEVEL_OUTOF10: 0-NO PAIN

## 2025-03-27 NOTE — PROGRESS NOTES
"Patient ID: Dina Mullins is a 74 y.o. female.  Referring Physician: No referring provider defined for this encounter.  Primary Care Provider: Ken Saleh PA-C    Subjective    Patient presents today for disease-specific surveillance for stage IA2 grade 2 endometrioid adenocarcinoma of the uterus; overall doing as anticipated since last assessment. Continues to struggle with dizziness related to vertigo - no recent falls. Dizziness limits her mobility and is in chair/bed for most of day at home. States her  helps her with most ADLs. Denies abdominopelvic pain, nausea/vomiting, fevers, chills, chest pain. Voiding and having regular bowel movements without difficulty. Denies vaginal bleeding. No other concerns/complaints. Declines PT due to inability to present to appointments and in-home PT \"because its a mess.\"    A comprehensive review of systems was performed and otherwise negative.       Objective    BSA: 2.15 meters squared  /83 (BP Location: Right arm, Patient Position: Sitting, BP Cuff Size: Adult long)   Pulse 67   Temp 37 °C (98.6 °F) (Temporal)   Resp 18   Wt 106 kg (233 lb 12.8 oz) Comment: patient left shoes on  SpO2 97%   BMI 42.76 kg/m²      Physical Exam  Vitals and nursing note reviewed. Exam conducted with a chaperone present.   Constitutional:       General: She is not in acute distress.     Appearance: Normal appearance. She is normal weight.   HENT:      Head: Normocephalic and atraumatic.      Mouth/Throat:      Mouth: Mucous membranes are moist.   Eyes:      Extraocular Movements: Extraocular movements intact.      Conjunctiva/sclera: Conjunctivae normal.      Pupils: Pupils are equal, round, and reactive to light.   Cardiovascular:      Rate and Rhythm: Normal rate.   Pulmonary:      Effort: Pulmonary effort is normal. No respiratory distress.      Breath sounds: No wheezing, rhonchi or rales.   Abdominal:      General: Bowel sounds are normal.      Palpations: " Abdomen is soft. There is no mass.      Tenderness: There is no guarding or rebound.      Hernia: No hernia is present.      Comments: Laparoscopic incisions C/D/I without redness, drainage or erythema   Genitourinary:     General: Normal vulva.      Vagina: Normal. No vaginal discharge, erythema or lesions.      Comments: Surgically absent uterus and cervix  Intact vaginal cuff without separation or drainage   Musculoskeletal:         General: Normal range of motion.      Cervical back: Normal range of motion and neck supple.   Skin:     General: Skin is warm.      Findings: No erythema or rash.   Neurological:      General: No focal deficit present.      Mental Status: She is alert and oriented to person, place, and time.      Motor: Weakness present.   Psychiatric:         Mood and Affect: Mood normal.         Behavior: Behavior normal.       Performance Status:  Symptomatic; in bed >50% of the day    Assessment/Plan   Oncology History Overview Note   Stage IA2 endometrioid adenocarcinoma of the uterus, pMMR      Malignant neoplasm of overlapping sites of right female breast   7/1/2024 Cancer Staged    Staging form: Breast, AJCC 8th Edition, Clinical stage from 7/1/2024: Stage IB (cT1, cN0(f), cM0, G3, ER-, HI-, HER2-) - Signed by Mabel Crenshaw MD on 9/3/2024     7/1/2024 Cancer Staged    Staging form: Breast, AJCC 8th Edition, Clinical stage from 7/1/2024: Stage IA (cT1, cN0(f), cM0, G1, ER+, HI+, HER2-) - Signed by Mabel Crenshaw MD on 9/3/2024     7/16/2024 Initial Diagnosis    Malignant neoplasm of overlapping sites of right female breast (Multi)     8/14/2024 Cancer Staged    Staging form: Breast, AJCC 8th Edition, Pathologic stage from 8/14/2024: Stage IIIA (pT2(2), pN1mi(sn), cM0, G3, ER-, HI-, HER2-) - Signed by Kacie Simms MD on 8/29/2024     Endometrial cancer (Multi)   9/26/2024 Initial Diagnosis    Endometrial cancer (Multi)     1/8/2025 Cancer Staged    Staging form: Corpus Uteri - Carcinoma and  Carcinosarcoma, AJCC 8th Edition and FIGO 2023, Pathologic stage from 1/8/2025: FIGO Stage IA2, calculated as Stage IA (pT1a, pN0(sn), cM0, POLE: Not Assessed, MMRd-, p53: Not Assessed) - Signed by Bib Buitrago MD on 2/3/2025        Diagnoses and all orders for this visit:  Endometrial cancer (Multi)  S/P hysterectomy with oophorectomy  - Discussed need for ongoing disease-specific surveillance as it pertains to endometrial cancer recurrence  - Signs and symptoms of disease recurrence were reviewed and all questions answered. We discussed the most common site for endometrial cancer recurrence includes abdomen and pelvis with additional risks of distant metastasis in lungs or liver. Symptoms for recurrent including: changes in bowel or bladder habits, abnormal vaginal discharge, and irregular bleeding.   - The following surveillance regimen is recommended based on NCCN and SGO guidelines and modified as necessary based on individual patient circumstances:            Surveillance Test Year 1 and 2  Year 3             Year 4 and 5            Physical Exam   4 months  6 months    6 months            Imaging   as needed  Dizziness  Generalized weakness  - Attributed to veritgo, deconditioning  - Declines PT evaluation; continue follow up per PCP      Mei Black MD

## 2025-04-02 ENCOUNTER — APPOINTMENT (OUTPATIENT)
Dept: HEMATOLOGY/ONCOLOGY | Facility: CLINIC | Age: 75
End: 2025-04-02
Payer: MEDICARE

## 2025-04-08 ENCOUNTER — APPOINTMENT (OUTPATIENT)
Dept: PHYSICAL THERAPY | Facility: CLINIC | Age: 75
End: 2025-04-08
Payer: MEDICARE

## 2025-04-14 PROBLEM — C77.3 SECONDARY AND UNSPECIFIED MALIGNANT NEOPLASM OF AXILLA AND UPPER LIMB LYMPH NODES: Status: RESOLVED | Noted: 2025-01-20 | Resolved: 2025-04-14

## 2025-04-14 NOTE — PROGRESS NOTES
BREAST SURGICAL ONCOLOGY FOLLOW UP     Subjective   Dina Mullins is a 74 y.o. female presents today for follow up carcinoma of the right breast.   She is doing well and has no complaints or concerns.  She also has endometrial cancer.    Treatment history:  Right breast cancer diagnosed 7/1/24  Pathology shows two separate breast cancers:  1) pT2 pN0 R poorly differentiated carcinoma with squamous differentiation (metaplastic), G3, ER negative, IA negative, HER2 negative (1+), 24mm  2) pT1c pN1mi (sn) R IDC, G1, ER+, IA+, HER2 negative (2+ with negative FISH), 14mm       Surgery: 8/14/2024: Right lumpectomy x 2 and right axillary sentinel lymph node biopsy  Radiation: 4256 cGy in 16 fractions to the right breast and undissected axilla followed by tumor bed boost 1000 cGy in 4 fractions to a total dose of 5256 cGy in 20 fractions  Systemic therapy: Medical oncology deemed a nonoperable candidate for adjuvant chemotherapy based on comorbid risk factors    Mammogram: 4/15/2025: Posttreatment changes in the right breast.  No evidence of malignancy.  Category 3.  Right diagnostic mammogram in October 2025.    Radiology review: All images and reports were personally reviewed and the findings discussed with the patient.     Review of Systems   Constitutional symptoms: Denies generalized fatigue.  Denies weight change, fevers/chills, difficulty sleeping   Eyes: Denies double vision, glaucoma, cataracts.  Ear/nose/throat/mouth: Denies hearing changes, sore throat, sinus problems.  Cardiovascular: No chest pain.  Denies irregular heartbeat.  Denies ankle swelling.  Respiratory: No wheezing, cough, or shortness of breath.  Gastrointestinal: No abdominal pain,  No nausea/vomiting.  No indigestion/heartburn.  No change in bowel habits.  No constipation or diarrhea.   Genitourinary: No urinary incontinence.  No urinary frequency.  No painful urination.  Musculoskeletal: No bone pain, no muscle pain, no joint pain.    Integumentary: No rash. No masses.  No changes in moles.  No easy bruising.  Neurological: No headaches.  No tremors. No numbness/tingling.  Psychiatric: No anxiety. No depression.  Endocrine: No excessive thirst.  Not too hot or too cold.  Not tired or fatigued.    Hematological/lymphatic: No swollen glands or blood clotting problems.  No bruising.    PHYSICAL EXAM:    General: Alert and oriented x 3.  Mood and affect are appropriate.  Neck: supple, no masses, no cervical adenopathy.  Cardiovascular: no lower extremity edema.  Pulmonary: breathing non labored on room air.  GI: Abdomen soft, no masses. No hepatomegaly or splenomegaly.  Lymph nodes: No supraclavicular or axillary adenopathy bilaterally.  Musculoskeletal: Full range of motion in the upper extremities bilaterally.  Neuro: denies dizziness, tremors  Physical Exam  Chest:      Comments: There is no cervical, supraclavicular, or axillary lymphadenopathy.  The breasts are symmetric bilaterally. In the left breast, there are no dominant masses, no skin changes, and no nipple discharge. In the right breast, central skin thickening consistent with radiation changes.  No palpable masses.  No nipple discharge.  Inverted right nipple.             Assessment/Plan   Stage IIIA multifocal right breast cancer diagnosed in July 2024.   -[id number]     Right breast 11:00   Invasive poorly differentiated carcinoma (metaplastic), ER negative, MD negative, HER2 negative    -[id number]   Right breast 12:00  Invasive ductal carcinoma, grade 1; ER> 95%, MD 85%, HER2 negative  -[id number]      Clinical breast exam and mammogram today are normal.  There is no evidence of cancer recurrence.    Continue follow up with medical oncology as scheduled.    Right mammogram in October 2025.    Will follow up with me in April 2026 at the time of bilateral mammogram or sooner if there are any breast concerns.   Mabel Crenshaw MD

## 2025-04-15 ENCOUNTER — HOSPITAL ENCOUNTER (OUTPATIENT)
Dept: RADIOLOGY | Facility: HOSPITAL | Age: 75
Discharge: HOME | End: 2025-04-15
Payer: MEDICARE

## 2025-04-15 ENCOUNTER — APPOINTMENT (OUTPATIENT)
Dept: RADIOLOGY | Facility: HOSPITAL | Age: 75
End: 2025-04-15
Payer: MEDICARE

## 2025-04-15 VITALS — BODY MASS INDEX: 42.88 KG/M2 | WEIGHT: 233 LBS | HEIGHT: 62 IN

## 2025-04-15 DIAGNOSIS — C50.112 MALIGNANT NEOPLASM OF CENTRAL PORTION OF LEFT FEMALE BREAST: ICD-10-CM

## 2025-04-15 DIAGNOSIS — C50.811 MALIGNANT NEOPLASM OF OVERLAPPING SITES OF RIGHT FEMALE BREAST, UNSPECIFIED ESTROGEN RECEPTOR STATUS: Primary | ICD-10-CM

## 2025-04-15 DIAGNOSIS — Z17.0 ESTROGEN RECEPTOR POSITIVE STATUS (ER+): ICD-10-CM

## 2025-04-15 DIAGNOSIS — C50.112 MALIGNANT NEOPLASM OF CENTRAL PORTION OF LEFT BREAST IN FEMALE, ESTROGEN RECEPTOR POSITIVE: ICD-10-CM

## 2025-04-15 DIAGNOSIS — Z17.0 MALIGNANT NEOPLASM OF CENTRAL PORTION OF LEFT BREAST IN FEMALE, ESTROGEN RECEPTOR POSITIVE: ICD-10-CM

## 2025-04-15 PROCEDURE — 77062 BREAST TOMOSYNTHESIS BI: CPT

## 2025-04-15 PROCEDURE — 77062 BREAST TOMOSYNTHESIS BI: CPT | Performed by: RADIOLOGY

## 2025-04-15 PROCEDURE — 77066 DX MAMMO INCL CAD BI: CPT | Performed by: RADIOLOGY

## 2025-04-16 ENCOUNTER — APPOINTMENT (OUTPATIENT)
Dept: HEMATOLOGY/ONCOLOGY | Facility: CLINIC | Age: 75
End: 2025-04-16
Payer: MEDICARE

## 2025-04-17 ENCOUNTER — APPOINTMENT (OUTPATIENT)
Dept: RADIOLOGY | Facility: HOSPITAL | Age: 75
End: 2025-04-17
Payer: MEDICARE

## 2025-04-22 ENCOUNTER — OFFICE VISIT (OUTPATIENT)
Dept: SURGICAL ONCOLOGY | Facility: CLINIC | Age: 75
End: 2025-04-22
Payer: MEDICARE

## 2025-04-22 VITALS
HEIGHT: 63 IN | HEART RATE: 78 BPM | RESPIRATION RATE: 18 BRPM | BODY MASS INDEX: 41.16 KG/M2 | DIASTOLIC BLOOD PRESSURE: 85 MMHG | SYSTOLIC BLOOD PRESSURE: 169 MMHG | TEMPERATURE: 97.5 F | WEIGHT: 232.3 LBS

## 2025-04-22 DIAGNOSIS — C50.811 MALIGNANT NEOPLASM OF OVERLAPPING SITES OF RIGHT FEMALE BREAST, UNSPECIFIED ESTROGEN RECEPTOR STATUS: Primary | ICD-10-CM

## 2025-04-22 DIAGNOSIS — Z17.0 MALIGNANT NEOPLASM OF CENTRAL PORTION OF LEFT BREAST IN FEMALE, ESTROGEN RECEPTOR POSITIVE: ICD-10-CM

## 2025-04-22 DIAGNOSIS — C50.112 MALIGNANT NEOPLASM OF CENTRAL PORTION OF LEFT BREAST IN FEMALE, ESTROGEN RECEPTOR POSITIVE: ICD-10-CM

## 2025-04-22 PROCEDURE — 1159F MED LIST DOCD IN RCRD: CPT | Performed by: SURGERY

## 2025-04-22 PROCEDURE — 99213 OFFICE O/P EST LOW 20 MIN: CPT | Performed by: SURGERY

## 2025-04-22 PROCEDURE — 1125F AMNT PAIN NOTED PAIN PRSNT: CPT | Performed by: SURGERY

## 2025-04-22 PROCEDURE — 3044F HG A1C LEVEL LT 7.0%: CPT | Performed by: SURGERY

## 2025-04-22 PROCEDURE — 1160F RVW MEDS BY RX/DR IN RCRD: CPT | Performed by: SURGERY

## 2025-04-22 PROCEDURE — 3077F SYST BP >= 140 MM HG: CPT | Performed by: SURGERY

## 2025-04-22 PROCEDURE — 3079F DIAST BP 80-89 MM HG: CPT | Performed by: SURGERY

## 2025-04-22 PROCEDURE — 1157F ADVNC CARE PLAN IN RCRD: CPT | Performed by: SURGERY

## 2025-04-22 ASSESSMENT — PAIN SCALES - GENERAL: PAINLEVEL_OUTOF10: 7

## 2025-04-22 NOTE — PATIENT INSTRUCTIONS
Follow up in Oct. with Right Mammogram  Follow up in one year with a Kb Mammogram and office visit with Dr. Crenshaw

## 2025-05-19 NOTE — PROGRESS NOTES
Subjective   Reason for Visit: Dina Mullins is an 74 y.o. female here for a Medicare wellness.    HPI  Patient Care Team:  Ken Saleh PA-C as PCP - General (Internal Medicine)  Quinn Reyes MD as PCP - [employer] PCP  Maine Birch MD as Consulting Physician (Hematology and Oncology)  Sherron Taylor RN as Registered Nurse (Hematology and Oncology)  Mei Black MD as Consulting Physician (Obstetrics and Gynecology)     I referred to PT and vestibular therapy last visit.    Hospital follow up:  Admitted from 1/8-1/11/25.  Discharge note reviewed.  Was admitted for observation after hysterectomy for endometrial adenocarcinoma.  Seeing GYN/onc.    Left arm pain:  Seeing ortho.  I gave Robaxin previously.  Was found to have moderate OA in her arm.  Symptoms x a couple weeks.  No acute injury.  Difficulty with raising her arm.  No elbow/wrist/neck pains.  Mostly in the shoulder area.    Breast cancer/endometrial cancer:  Had hysterectomy in Jan 2025.  Had a lumpectomy in Aug 2024.  Was found to be invasive ductal carcinoma in July 2024.  Following with surgical oncology.  Screening and diagnostic mammograms were abnormal in May 2024.    HTN/A-fib:  Taking Lasix, Xarelto.  Seeing cardiology.  BP today is   Denies headaches.    Diabetes:  Taking Lantus 25 units daily, Rybelsus.  Last A1C was 6.2 on 2/25/25.  POC today is     Dizziness/Tinnitus/BPPV:  I referred to ENT and vestibular therapy previously.  She has never seen vestibular therapy.  She had recently been started on Dopamine by neurology when this started.  Taking Meclizine, but this no longer helps.  She did have a normal brain MRI in Feb 2025.  She does see neurology.  Does not follow with ENT - states they were worthless and just told her to hold her head straight for 3 days.  Causing everyday struggles right now.    HLD:  Taking Atorvastatin.  Last checked Sep 2024.    Anxiety/Depression:  Taking Cymbalta.  Denies  SI/HI.    Parkinson's:  Taking Dopamine now.  Seeing neurology.    Stage III CKD:  Creatinine is stable in Feb 2025.    Health maintenance:  Smoking: Never a smoker.  Mammogram (40-75): Abnormal screening/diagnostic mammo in April 2024. Following with surgical oncology.  Labs: Feb 2025.  DEXA (65+, q 2 years): Dec 2021.  Prevnar 13 (65+): 2015  Pneumovax 23 (65+, 1 year after Prev 13): 2020  Influenza:  Zostavax (60+, 1 time, at pharmacy):    Review of Systems  12 point review of systems negative unless stated above in HPI    Objective   Vitals:  There were no vitals taken for this visit.      Physical Exam  General: Alert and oriented, well nourished, no acute distress.  Lungs: Clear to auscultation, non-labored respiration.  Heart: Normal rate, regular rhythm, no murmur, gallop or edema.  Neurologic: Awake, alert, and oriented X3, CN II-XII intact.  Psychiatric: Cooperative, appropriate mood and affect.    Assessment/Plan     Problem List Items Addressed This Visit          Cardiac and Vasculature    Benign essential HTN    Hyperlipidemia    Paroxysmal atrial fibrillation (Multi)    Atrial fibrillation (Multi)       ENT    Benign paroxysmal positional vertigo due to bilateral vestibular disorder       Endocrine/Metabolic    Type 2 diabetes mellitus with obesity (Multi)    Relevant Orders    POCT glycosylated hemoglobin (Hb A1C) manually resulted       Genitourinary and Reproductive    Stage 3b chronic kidney disease (Multi)       Hematology and Neoplasia    Endometrial cancer (Multi)    Malignant neoplasm of overlapping sites of right female breast, unspecified estrogen receptor status       Mental Health    Mixed anxiety and depressive disorder       Neuro    Parkinsonism (Multi)       Symptoms and Signs    Dizziness    Weakness     Other Visit Diagnoses         Medicare annual wellness visit, subsequent    -  Primary      Depression screening

## 2025-05-27 ENCOUNTER — OFFICE VISIT (OUTPATIENT)
Dept: PRIMARY CARE | Facility: CLINIC | Age: 75
End: 2025-05-27
Payer: MEDICARE

## 2025-05-27 VITALS
DIASTOLIC BLOOD PRESSURE: 82 MMHG | HEART RATE: 85 BPM | RESPIRATION RATE: 16 BRPM | BODY MASS INDEX: 42.33 KG/M2 | HEIGHT: 62 IN | SYSTOLIC BLOOD PRESSURE: 130 MMHG | WEIGHT: 230 LBS | OXYGEN SATURATION: 96 %

## 2025-05-27 DIAGNOSIS — C50.811 MALIGNANT NEOPLASM OF OVERLAPPING SITES OF RIGHT FEMALE BREAST, UNSPECIFIED ESTROGEN RECEPTOR STATUS: ICD-10-CM

## 2025-05-27 DIAGNOSIS — R09.81 NASAL CONGESTION: ICD-10-CM

## 2025-05-27 DIAGNOSIS — C54.1 ENDOMETRIAL CANCER (MULTI): ICD-10-CM

## 2025-05-27 DIAGNOSIS — Z00.00 ROUTINE GENERAL MEDICAL EXAMINATION AT HEALTH CARE FACILITY: ICD-10-CM

## 2025-05-27 DIAGNOSIS — R42 DIZZINESS: ICD-10-CM

## 2025-05-27 DIAGNOSIS — I10 BENIGN ESSENTIAL HTN: ICD-10-CM

## 2025-05-27 DIAGNOSIS — E66.813 OBESITY, CLASS III, BMI 40-49.9 (MORBID OBESITY): ICD-10-CM

## 2025-05-27 DIAGNOSIS — F41.8 MIXED ANXIETY AND DEPRESSIVE DISORDER: ICD-10-CM

## 2025-05-27 DIAGNOSIS — I48.11 LONGSTANDING PERSISTENT ATRIAL FIBRILLATION (MULTI): ICD-10-CM

## 2025-05-27 DIAGNOSIS — E78.00 PURE HYPERCHOLESTEROLEMIA: ICD-10-CM

## 2025-05-27 DIAGNOSIS — E66.01 MORBID (SEVERE) OBESITY DUE TO EXCESS CALORIES (MULTI): ICD-10-CM

## 2025-05-27 DIAGNOSIS — N18.31 CHRONIC KIDNEY DISEASE, STAGE 3A (MULTI): ICD-10-CM

## 2025-05-27 DIAGNOSIS — E66.9 TYPE 2 DIABETES MELLITUS WITH OBESITY (MULTI): ICD-10-CM

## 2025-05-27 DIAGNOSIS — I48.0 PAROXYSMAL ATRIAL FIBRILLATION (MULTI): ICD-10-CM

## 2025-05-27 DIAGNOSIS — E11.69 TYPE 2 DIABETES MELLITUS WITH OBESITY (MULTI): ICD-10-CM

## 2025-05-27 DIAGNOSIS — N18.32 STAGE 3B CHRONIC KIDNEY DISEASE (MULTI): ICD-10-CM

## 2025-05-27 DIAGNOSIS — R53.1 WEAKNESS: ICD-10-CM

## 2025-05-27 DIAGNOSIS — Z00.00 MEDICARE ANNUAL WELLNESS VISIT, SUBSEQUENT: Primary | ICD-10-CM

## 2025-05-27 DIAGNOSIS — Z13.31 DEPRESSION SCREENING: ICD-10-CM

## 2025-05-27 DIAGNOSIS — G20.A1 PARKINSON'S DISEASE, UNSPECIFIED WHETHER DYSKINESIA PRESENT, UNSPECIFIED WHETHER MANIFESTATIONS FLUCTUATE: ICD-10-CM

## 2025-05-27 DIAGNOSIS — H81.13 BENIGN PAROXYSMAL POSITIONAL VERTIGO DUE TO BILATERAL VESTIBULAR DISORDER: ICD-10-CM

## 2025-05-27 LAB — POC HEMOGLOBIN A1C: 5.8 % (ref 4.2–6.5)

## 2025-05-27 PROCEDURE — 83036 HEMOGLOBIN GLYCOSYLATED A1C: CPT | Mod: QW | Performed by: PHYSICIAN ASSISTANT

## 2025-05-27 PROCEDURE — 99215 OFFICE O/P EST HI 40 MIN: CPT | Performed by: PHYSICIAN ASSISTANT

## 2025-05-27 PROCEDURE — 99397 PER PM REEVAL EST PAT 65+ YR: CPT | Performed by: PHYSICIAN ASSISTANT

## 2025-05-27 PROCEDURE — 99214 OFFICE O/P EST MOD 30 MIN: CPT | Mod: 25 | Performed by: PHYSICIAN ASSISTANT

## 2025-05-27 RX ORDER — INSULIN GLARGINE 100 [IU]/ML
20 INJECTION, SOLUTION SUBCUTANEOUS EVERY MORNING
Start: 2025-05-27 | End: 2025-07-26

## 2025-05-27 RX ORDER — PREDNISONE 20 MG/1
TABLET ORAL
Qty: 11 TABLET | Refills: 0 | Status: SHIPPED | OUTPATIENT
Start: 2025-05-27

## 2025-05-27 RX ORDER — CETIRIZINE HYDROCHLORIDE 10 MG/1
10 TABLET ORAL DAILY
Qty: 90 TABLET | Refills: 1 | Status: SHIPPED | OUTPATIENT
Start: 2025-05-27 | End: 2025-05-29 | Stop reason: ALTCHOICE

## 2025-05-27 ASSESSMENT — ACTIVITIES OF DAILY LIVING (ADL)
DOING HOUSEWORK: INDEPENDENT
FEEDING: INDEPENDENT
USING TELEPHONE: INDEPENDENT
PILL BOX USED: NO
ADEQUATE_TO_COMPLETE_ADL: YES
BATHING: INDEPENDENT
DOING_HOUSEWORK: INDEPENDENT
TOILETING: INDEPENDENT
TAKING_MEDICATION: INDEPENDENT
DRESSING: INDEPENDENT
MANAGING FINANCES: INDEPENDENT
GROCERY SHOPPING: INDEPENDENT
USING TRANSPORTATION: INDEPENDENT
STIL DRIVING: YES
NEEDS ASSISTANCE WITH FOOD: INDEPENDENT
PREPARING MEALS: INDEPENDENT
DRESSING: INDEPENDENT
TAKING MEDICATION: INDEPENDENT
JUDGMENT_ADEQUATE_SAFELY_COMPLETE_DAILY_ACTIVITIES: YES
GROCERY_SHOPPING: INDEPENDENT
BATHING: INDEPENDENT
MANAGING_FINANCES: INDEPENDENT
EATING: INDEPENDENT

## 2025-05-27 ASSESSMENT — ENCOUNTER SYMPTOMS
LOSS OF SENSATION IN FEET: 0
DEPRESSION: 0
OCCASIONAL FEELINGS OF UNSTEADINESS: 1

## 2025-05-27 ASSESSMENT — PAIN SCALES - GENERAL: PAINLEVEL_OUTOF10: 9

## 2025-05-27 NOTE — ASSESSMENT & PLAN NOTE
Orders:    predniSONE (Deltasone) 20 mg tablet; TAKE 2 TABLETS BY MOUTH DAYS 1-4. THEN TAKE 1 TABLET BY MOUTH DAYS 5-7.

## 2025-05-27 NOTE — ASSESSMENT & PLAN NOTE
Orders:    POCT glycosylated hemoglobin (Hb A1C) manually resulted    Albumin-Creatinine Ratio, Urine Random; Future    Lantus Solostar U-100 Insulin 100 unit/mL (3 mL) pen; Inject 20 Units under the skin once daily in the morning.

## 2025-05-27 NOTE — PROGRESS NOTES
Subjective   Reason for Visit: Dina Mullins is an 74 y.o. female here for a Medicare Wellness visit.     Past Medical, Surgical, and Family History reviewed and updated in chart.    Reviewed all medications by prescribing practitioner or clinical pharmacist (such as prescriptions, OTCs, herbal therapies and supplements) and documented in the medical record.    [facility]  Patient Care Team:  Ken Saleh PA-C as PCP - General (Internal Medicine)  Quinn Reyes MD as PCP - [employer] PCP  Maine Birch MD as Consulting Physician (Hematology and Oncology)  Sherron Taylor RN as Registered Nurse (Hematology and Oncology)  Mei Black MD as Consulting Physician (Obstetrics and Gynecology)     I referred to PT and vestibular therapy last visit.  She is seeing them next week.    Hospital follow up:  Admitted from 1/8-1/11/25.  Discharge note reviewed.  Was admitted for observation after hysterectomy for endometrial adenocarcinoma.  Seeing GYN/onc.    Left arm pain:  Seeing ortho.  I gave Robaxin previously.  Was found to have moderate OA in her arm.  Symptoms x a couple weeks.  No acute injury.  Difficulty with raising her arm.  No elbow/wrist/neck pains.  Mostly in the shoulder area.    Breast cancer/endometrial cancer:  Had hysterectomy in Jan 2025.  Had a lumpectomy in Aug 2024.  Was found to be invasive ductal carcinoma in July 2024.  Following with surgical oncology.  Screening and diagnostic mammograms were abnormal in May 2024.    HTN/A-fib:  Taking Lasix, Xarelto.  Seeing cardiology.  BP today is 130/82.  Denies headaches.    Diabetes:  Taking Lantus 25 units daily.  Last A1C was 6.2 on 2/25/25.  POC today is 5.8.  She is having lower sugars into the 60s on occasion.    Dizziness/Tinnitus/BPPV:  I referred to ENT and vestibular therapy previously.  She has never seen vestibular therapy.  She had recently been started on Dopamine by neurology when this started.  Taking Meclizine, but  "this no longer helps.  She did have a normal brain MRI in Feb 2025.  She does see neurology.  Does not follow with ENT - states they were worthless and just told her to hold her head straight for 3 days.  Causing everyday struggles right now.    HLD:  Taking Atorvastatin.  Last checked Sep 2024.    Anxiety/Depression:  Taking Cymbalta.  Denies SI/HI.    Parkinson's:  Taking Dopamine now.  Seeing neurology.    Stage III CKD:  Creatinine is stable in Feb 2025.    Health maintenance:  Smoking: Never a smoker.  Mammogram (40-75): Abnormal screening/diagnostic mammo in April 2024. Following with surgical oncology.  Labs: Feb 2025.  DEXA (65+, q 2 years): Dec 2021.  Prevnar 13 (65+): 2015  Pneumovax 23 (65+, 1 year after Prev 13): 2020  Influenza:  Zostavax (60+, 1 time, at pharmacy):    Review of Systems  12 point review of systems negative unless stated above in HPI    Objective   Vitals:  /82   Pulse 85   Resp 16   Ht 1.575 m (5' 2\")   Wt 104 kg (230 lb)   SpO2 96%   BMI 42.07 kg/m²       Physical Exam  General: Alert and oriented, well nourished, no acute distress.  Lungs: Clear to auscultation, non-labored respiration.  Heart: Normal rate, regular rhythm, no murmur, gallop or edema.  Neurologic: Awake, alert, and oriented X3, CN II-XII intact.  Psychiatric: Cooperative, appropriate mood and affect.  Assessment & Plan  Medicare annual wellness visit, subsequent  It was good seeing you!  This counts as your annual Medicare Wellness visit.  Health maintenance was reviewed today.  Depression screening is negative (5 -15 min screening was completed).  A1C is coming down!  We can decrease the Lantus to 20 units once daily.  I have also sent in Prednisone and Zyrtec to try for the shortness of breath/congestion.  Continue specialty care.  If symptoms persist or worsen despite current plan of care, please contact your healthcare provider for further evaluation.  Patient instructed to contact the office if there " are any questions regarding their care or treatment.   [facility] [phone]  Continue the same medications.  Chronic conditions are stable.  Call with questions or concerns.    Follow up  3 months       Depression screening         Endometrial cancer (Multi)         Type 2 diabetes mellitus with obesity (Multi)    Orders:    POCT glycosylated hemoglobin (Hb A1C) manually resulted    Albumin-Creatinine Ratio, Urine Random; Future    Lantus Solostar U-100 Insulin 100 unit/mL (3 mL) pen; Inject 20 Units under the skin once daily in the morning.    Paroxysmal atrial fibrillation (Multi)         Benign essential HTN         Pure hypercholesterolemia         Parkinson's disease, unspecified whether dyskinesia present, unspecified whether manifestations fluctuate         Longstanding persistent atrial fibrillation (Multi)         Mixed anxiety and depressive disorder         Malignant neoplasm of overlapping sites of right female breast, unspecified estrogen receptor status         Dizziness         Weakness    Orders:    predniSONE (Deltasone) 20 mg tablet; TAKE 2 TABLETS BY MOUTH DAYS 1-4. THEN TAKE 1 TABLET BY MOUTH DAYS 5-7.    Benign paroxysmal positional vertigo due to bilateral vestibular disorder         Stage 3b chronic kidney disease (Multi)         Chronic kidney disease, stage 3a (Multi)         Morbid (severe) obesity due to excess calories (Multi)         Routine general medical examination at health care facility    Orders:    1 Year Follow Up In Primary Care - Wellness Exam; Future    BMI 40.0-44.9, adult (Multi)         Obesity, Class III, BMI 40-49.9 (morbid obesity)         Nasal congestion    Orders:    cetirizine (ZyrTEC) 10 mg tablet; Take 1 tablet (10 mg) by mouth once daily.

## 2025-05-29 ENCOUNTER — APPOINTMENT (OUTPATIENT)
Dept: NEUROLOGY | Facility: CLINIC | Age: 75
End: 2025-05-29
Payer: MEDICARE

## 2025-05-29 VITALS
BODY MASS INDEX: 39.87 KG/M2 | WEIGHT: 225 LBS | HEIGHT: 63 IN | SYSTOLIC BLOOD PRESSURE: 126 MMHG | HEART RATE: 84 BPM | DIASTOLIC BLOOD PRESSURE: 82 MMHG

## 2025-05-29 DIAGNOSIS — R26.9 ABNORMAL GAIT: ICD-10-CM

## 2025-05-29 DIAGNOSIS — G20.C PRIMARY PARKINSONISM (MULTI): ICD-10-CM

## 2025-05-29 DIAGNOSIS — R42 VERTIGO: Primary | ICD-10-CM

## 2025-05-29 DIAGNOSIS — G20.A1 PARKINSON'S DISEASE, UNSPECIFIED WHETHER DYSKINESIA PRESENT, UNSPECIFIED WHETHER MANIFESTATIONS FLUCTUATE: ICD-10-CM

## 2025-05-29 PROCEDURE — 3074F SYST BP LT 130 MM HG: CPT | Performed by: PSYCHIATRY & NEUROLOGY

## 2025-05-29 PROCEDURE — 1036F TOBACCO NON-USER: CPT | Performed by: PSYCHIATRY & NEUROLOGY

## 2025-05-29 PROCEDURE — 1159F MED LIST DOCD IN RCRD: CPT | Performed by: PSYCHIATRY & NEUROLOGY

## 2025-05-29 PROCEDURE — 3079F DIAST BP 80-89 MM HG: CPT | Performed by: PSYCHIATRY & NEUROLOGY

## 2025-05-29 PROCEDURE — 99214 OFFICE O/P EST MOD 30 MIN: CPT | Performed by: PSYCHIATRY & NEUROLOGY

## 2025-05-29 PROCEDURE — 3044F HG A1C LEVEL LT 7.0%: CPT | Performed by: PSYCHIATRY & NEUROLOGY

## 2025-05-29 RX ORDER — CARBIDOPA AND LEVODOPA 25; 100 MG/1; MG/1
2 TABLET ORAL NIGHTLY
Qty: 180 TABLET | Refills: 3 | Status: SHIPPED | OUTPATIENT
Start: 2025-05-29 | End: 2026-05-29

## 2025-05-29 NOTE — PROGRESS NOTES
Subjective   Dina Mullins is a 74 y.o.   female.  HPI  This is a 74 year old woman with BPPV, parkinsonism, last seen in 11/25/24.    The positional vertigo is associated with tinnitus, seen by ENT- recommended vestibular rehab.  She had a MRI of the brain without and with contrast- only showed mild microangiopathy changes- no acute stroke/neoplasm/mass effect.  The vertigo was not responsive to meclizine, also tried a transderm scopolamine.  She feels off balance on her feet, also vertigo (also with turning over in bed).  Sometimes has tremor in the left foot when watching television.  Her radiation for the breast cancer is completed- not necessary for the ovarian cancer.  She is being followed by medical oncology, breast surgery.  She walks with a cane.  Objective   Neurological Exam  Hypomimia, fluent speech, normal volume of speech  No hypertonia, bradykinesia, tremor  Gait is slow, using a cane.  Physical Exam  I personally reviewed laboratory, radiographic, and medical studies which were pertinent for nothing.    Assessment/Plan

## 2025-05-29 NOTE — PATIENT INSTRUCTIONS
You have chronic positional vertigo: I recommend a trial of vestibular rehab, and if not helpful, referral to [facility] Dizziness/balance center for a second opinion.  There is no evidence of a neoplasm in the brain causing the dizziness/vertigo.  I recommend continuing the low dose carbidopa-levadopa, since I do not think you have idiopathic Parkinson's disease, rather parkinsonism- less likely to respond to the carbidopa-levadopa.  Follow up with me in 6 months.

## 2025-06-02 ENCOUNTER — CLINICAL SUPPORT (OUTPATIENT)
Dept: PHYSICAL THERAPY | Facility: CLINIC | Age: 75
End: 2025-06-02
Payer: MEDICARE

## 2025-06-02 DIAGNOSIS — R42 DIZZINESS: ICD-10-CM

## 2025-06-02 DIAGNOSIS — H81.13 BENIGN PAROXYSMAL POSITIONAL VERTIGO DUE TO BILATERAL VESTIBULAR DISORDER: ICD-10-CM

## 2025-06-02 DIAGNOSIS — E78.5 HYPERLIPIDEMIA, UNSPECIFIED HYPERLIPIDEMIA TYPE: ICD-10-CM

## 2025-06-02 PROCEDURE — 97162 PT EVAL MOD COMPLEX 30 MIN: CPT | Mod: GP

## 2025-06-02 RX ORDER — ATORVASTATIN CALCIUM 40 MG/1
40 TABLET, FILM COATED ORAL NIGHTLY
Qty: 90 TABLET | Refills: 0 | Status: SHIPPED | OUTPATIENT
Start: 2025-06-02

## 2025-06-02 ASSESSMENT — PAIN SCALES - GENERAL: PAINLEVEL_OUTOF10: 5 - MODERATE PAIN

## 2025-06-02 ASSESSMENT — PAIN - FUNCTIONAL ASSESSMENT: PAIN_FUNCTIONAL_ASSESSMENT: 0-10

## 2025-06-02 NOTE — PROGRESS NOTES
"    Physical Therapy Evaluation    Patient Name: Dina Mullins  MRN: [id number]  Evaluation Date: 6/2/2025  Time Calculation  Start Time: 1230  Stop Time: 1300  Time Calculation (min): 30 min  PT Evaluation Time Entry  PT Evaluation (Moderate) Time Entry: 30             Problem List Items Addressed This Visit           ICD-10-CM    Dizziness R42    Relevant Orders    Follow Up In Physical Therapy    Benign paroxysmal positional vertigo due to bilateral vestibular disorder H81.13    Relevant Orders    Follow Up In Physical Therapy         Subjective   General:  General  Reason for Referral: Dizziness  Referred By: Ken Saleh PA-C  Past Medical History Relevant to Rehab: 73 y/o F who presents today with cc of dizziness and general weakness.  Contributes to positional changes.  Denies significant \"spinning\", stating : \" I am getting more light headed\"  General Comment: Presents in W/C.  Reports limited ability to ambulate due to h/o dizziness and OA B/L knees    Patient reported hx of condition: H/O dizziness multiple months.       Surgery:   No    Precautions:  Fall risk, dizziness, syncopal considerations, general weakness    Relevant PMH:  CA, DM, heart disease, OA B/L knees, a-fib    Red flags: Exhibits minimal vertiginous symptomology at time of evaluation but rather near syncope that could be contributed to orthostatics.  BP cuff not available at time of evaluation.  Will benefit from future monitoring of BP.     Pain:  Pain Assessment: 0-10  0-10 (Numeric) Pain Score: 5 - Moderate pain  Pain Type: Chronic pain  Pain Location: Knee (B/L)  Home Living:  Home Living Comment:  provides some assist at home.    Prior Function Per Pt/Caregiver Report:  Level of [place]: Needs assistance with homemaking, Needs assistance with ADLs, Needs assistance with functional transfers  Ambulatory Assistance: Needs assistance  Gait: Stand by ( stands by while patient walks limited distances with " "cane.)  Prior Function Comments: Denies full syncopal event    OBJECTIVE:  Objective   Posture:  Posture Comment: Flexed trunk, weak rectus abdominis  Range of Motion:  Range of Motion Comments: Gross LE ROM WFL  Strength:  Strength Comments: 3 to 3+/5 B/L LE's, poor trunk strength 2-/5 abdomen with severe difficutly controlling trunk and rolling over in bed.  Flexibility:  Flexibility Comment: N/A  Palpation:  Palpation Comment: N/A  Special Tests:  Special Tests Comment: + R to L upbeat nystagmus with visual tracking L and L alicia hallpike, geotrophic lasting about 15 seconds.  Convergence/divergence intact.  Gross extra-occulars intact.  + Saccades B/L.  + VOR cancellation.  Gross peripheral vision intact.  + L alicia hallpike with mod A x 1 for trunk and neck support  Gait:  Gait Comment: Anatlgic, shuffling with SPC RUE and CGA.  Decreased B/L LE step/stride length.  Limited distances  Balance:  Balance Comment: Poor, high falls risk.  Stairs:  Stairs Comment: N/A  Bed Mobility:  Bed Mobility Comment: Moderate assist for all bed mobility  Transfers:  Transfers Comment: close S to CGA for STS from bed and wheelchair  Other:  Comment: Standing during about 2 minutes.  + Dizziness with bendig over to remove object from floor.    Outcome Measures:  Other Measures  Dizziness Handicap Inventory: 78     Assessment  PT Assessment Results: Decreased strength, Decreased range of motion, Impaired balance, Decreased endurance, Decreased mobility, Decreased coordination, Pain (Dizziness, s/o being \"light headed\")  Rehab Prognosis: Fair  Evaluation/Treatment Tolerance: Other (Comment) (Limited by dizziness with alicia hallpike maneuver, anxious with bed mobility)    Pt is a 74 y.o. female who presents with impairments of dizziness and general weakness of trunk and LE's. These impairments have led to functional limitations including impaired mobility and standing endurance. Pt would benefit from skilled physical therapy intervention " to improve above impairments and facilitate return to function.    Complexity of Evaluation: Moderate    Based on the history including personal factors and/or comorbidities, examination of body systems including body structures and function, activity limitations, and/or participation restrictions, as well as clinical presentation, patient meets criteria for above complexity evaluation.    Plan  Treatment/Interventions: Aquatic therapy, Canalith repositioning, Dry needling, Education/ Instruction, Gait training, Manual therapy, Neuromuscular re-education, Orthosis fit/ training, Self care/ home management, Taping techniques, Therapeutic activities, Therapeutic exercises, Ultrasound  PT Plan: Skilled PT  PT Frequency: 2 times per week  Duration: 8-10 weeks  Onset Date: 06/02/25  Certification Period Start Date: 06/02/25  Number of Treatments Authorized: Auth required  Rehab Potential: Fair  Plan of Care Agreement: Patient    Insurance Plan: Payor: [employer] / Plan: [employer]ICARE / Product Type: *No Product type* /     Plan for next visit: Re-assess B/L alicia manishke, perform Epley's    OP EDUCATION:  Outpatient Education  Individual(s) Educated: Patient, Spouse  Education Provided: POC  Risk and Benefits Discussed with Patient/Caregiver/Other: yes  Patient/Caregiver Demonstrated Understanding: yes  Plan of Care Discussed and Agreed Upon: yes  Patient Response to Education: Patient/Caregiver Verbalized Understanding of Information    Today's Treatment:  Evaluation and discussion  HEP to be completed daily, exercises include:  TBD    Goals:  STG 1: Patient will be IND with seated LE strengthening HEP x 5 visits  STG 2: Patient will report 10% improvement in symptomatic dizziness  LTG 1: Patient will report DHI score < 50  LTG 2: Patient will demonstrate 3 + to 4-/5 B/L LE strength  LTG 3: Patient will complete TUG in < 20 seconds with SPC RUE and close as sign of improved functional  mobility efficiency.   LTG 4: Patient will report no dizziness with bed mobility x 2 weeks.

## 2025-06-03 ENCOUNTER — TELEPHONE (OUTPATIENT)
Dept: PRIMARY CARE | Facility: CLINIC | Age: 75
End: 2025-06-03
Payer: MEDICARE

## 2025-06-23 ENCOUNTER — TELEPHONE (OUTPATIENT)
Dept: SURGICAL ONCOLOGY | Facility: CLINIC | Age: 75
End: 2025-06-23
Payer: MEDICARE

## 2025-06-25 ENCOUNTER — TREATMENT (OUTPATIENT)
Dept: PHYSICAL THERAPY | Facility: CLINIC | Age: 75
End: 2025-06-25
Payer: MEDICARE

## 2025-06-25 DIAGNOSIS — R42 DIZZINESS: ICD-10-CM

## 2025-06-25 DIAGNOSIS — H81.13 BENIGN PAROXYSMAL POSITIONAL VERTIGO DUE TO BILATERAL VESTIBULAR DISORDER: ICD-10-CM

## 2025-06-25 PROCEDURE — 97110 THERAPEUTIC EXERCISES: CPT | Mod: GP

## 2025-06-26 PROBLEM — N64.4 BREAST PAIN, RIGHT: Status: ACTIVE | Noted: 2025-06-26

## 2025-06-26 NOTE — PROGRESS NOTES
"    Physical Therapy Treatment    Patient Name: Dina Mullins  MRN: [id number]  Encounter date:  6/25/2025  Time Calculation  Start Time: 1300  Stop Time: 1340  Time Calculation (min): 40 min     PT Therapeutic Procedures Time Entry  Therapeutic Exercise Time Entry: 15       Visit Number:  1/6 approved  Planned total visits: 6  Visits Authorized/Insurance Coverage:  6 visits authorized until 8/12    Current Problem  Problem List Items Addressed This Visit           ICD-10-CM    Dizziness R42    Benign paroxysmal positional vertigo due to bilateral vestibular disorder H81.13       Precautions  Falls  Dizziness/vertiginous symptom monitoring    Pain  Denies    Subjective  Patient reports she continues to be very symptomatic with all mobility including bed mobility, transfers and standing tolerance.  States \"I can't to a lot\" and \"I don't go any where\".  Denies follow up with doctors.  Denies falls.  To see \"cancer doctor\" next week.  Denies \"spinning\" symptoms.    Objective  BP sitting 133/77  BP standing after 1 min 86/54  + Orthostatics  Poor dynamic balance  +Retro LOB standing in front of W/C    Treatment:  Therapeutic exercise x 15 minutes  Seated in W/C x 10 each B/L LE's  Ankle pumps, laq's, marching, hip ABD/ADD, ISO ABD 3 sec holds, alt UE flex and ABD each    20 minutes spent with extensive education in recommendation of follow up with primary or cardiology regarding BP concerns.  Discussed vertigo etiology vs. Becoming light headed with regards to spinning or getting light headed.  (Not billed)      Current HEP:  Seated LE exercise in W/C  Defer Epley's    Has patient been compliant with HEP? N/A    Activity tolerance:  Poor    OP EDUCATION:   See above    Assessment:  Tx limited by c/o \"feeling unwell\" and + orthostatic in standing    Pain end of session: 0    Plan:     Continue with current POC/no changes  Recommend follow up with primary; patient verbalizes understanding    Assessment of current " progress against goals:  Insufficient treatment time to assess progress    Goals:   See evaluation

## 2025-06-26 NOTE — PROGRESS NOTES
BREAST SURGICAL ONCOLOGY FOLLOW UP     Subjective   Dina Mullins is a 74 y.o. female presents today for follow up carcinoma of the right breast.   She is having right breast pain.  It started just after she last saw me in April.  The right breast has also become swollen and very heavy.  It is uncomfortable.  She also has endometrial cancer.    Treatment history:  Right breast cancer diagnosed 7/1/24  Pathology shows two separate breast cancers:  1) pT2 pN0 R poorly differentiated carcinoma with squamous differentiation (metaplastic), G3, ER negative, KS negative, HER2 negative (1+), 24mm  2) pT1c pN1mi (sn) R IDC, G1, ER+, KS+, HER2 negative (2+ with negative FISH), 14mm       Surgery: 8/14/2024: Right lumpectomy x 2 and right axillary sentinel lymph node biopsy  Radiation: 4256 cGy in 16 fractions to the right breast and undissected axilla followed by tumor bed boost 1000 cGy in 4 fractions to a total dose of 5256 cGy in 20 fractions  Systemic therapy: Medical oncology deemed a nonoperable candidate for adjuvant chemotherapy based on comorbid risk factors    Mammogram: 4/15/2025: Posttreatment changes in the right breast.  No evidence of malignancy.  Category 3.  Right diagnostic mammogram in October 2025.    Radiology review: All images and reports were personally reviewed and the findings discussed with the patient.     Review of Systems   Constitutional symptoms: Denies generalized fatigue.  Denies weight change, fevers/chills, difficulty sleeping   Eyes: Denies double vision, glaucoma, cataracts.  Ear/nose/throat/mouth: Denies hearing changes, sore throat, sinus problems.  Cardiovascular: No chest pain.  Denies irregular heartbeat.  Denies ankle swelling.  Respiratory: No wheezing, cough, or shortness of breath.  Gastrointestinal: No abdominal pain,  No nausea/vomiting.  No indigestion/heartburn.  No change in bowel habits.  No constipation or diarrhea.   Genitourinary: No urinary incontinence.  No  urinary frequency.  No painful urination.  Musculoskeletal: No bone pain, no muscle pain, no joint pain.   Integumentary: No rash. No masses.  No changes in moles.  No easy bruising.  Neurological: No headaches.  No tremors. No numbness/tingling.  Psychiatric: No anxiety. No depression.  Endocrine: No excessive thirst.  Not too hot or too cold.  Not tired or fatigued.    Hematological/lymphatic: No swollen glands or blood clotting problems.  No bruising.    PHYSICAL EXAM:    General: Alert and oriented x 3.  Mood and affect are appropriate.  Neck: supple, no masses, no cervical adenopathy.  Cardiovascular: no lower extremity edema.  Pulmonary: breathing non labored on room air.  GI: Abdomen soft, no masses. No hepatomegaly or splenomegaly.  Lymph nodes: No supraclavicular or axillary adenopathy bilaterally.  Musculoskeletal: Full range of motion in the upper extremities bilaterally.  Neuro: denies dizziness, tremors  Physical Exam  Chest:      Comments: There is no cervical, supraclavicular, or axillary lymphadenopathy.  The right breast is larger than the left.  In the left breast there are no palpable masses, skin changes or nipple discharge.  In the right breast, central skin thickening and hyperpigmentation consistent with radiation changes.  No palpable masses.  No nipple discharge.  Inverted right nipple.             Assessment/Plan   Stage IIIA multifocal right breast cancer diagnosed in July 2024.   -[id number]     Right breast 11:00   Invasive poorly differentiated carcinoma (metaplastic), ER negative, ME negative, HER2 negative    -[id number]   Right breast 12:00  Invasive ductal carcinoma, grade 1; ER> 95%, ME 85%, HER2 negative  -[id number]      Mammogram in 4/25 shows diffuse reticular pattern throughout the right breast with thickened skin consistent with radiation changes.  There is no clinical evidence of cancer recurrence.     Schedule diagnostic right mammogram.  I do believe your right breast changes  are consistent with lymphedema of the right breast.  This is fluid that collects in the breast instead of draining into the lymphatic system under your arm.  A compression bra may help.  You should wear this all the time including when you sleep.    If the mammogram does not show any other findings, we will refer you to lymphedema clinic to help with massaging techniques.    Mabel Crenshaw MD

## 2025-07-01 ENCOUNTER — OFFICE VISIT (OUTPATIENT)
Dept: SURGICAL ONCOLOGY | Facility: CLINIC | Age: 75
End: 2025-07-01
Payer: MEDICARE

## 2025-07-01 VITALS
TEMPERATURE: 98.6 F | RESPIRATION RATE: 18 BRPM | HEART RATE: 71 BPM | WEIGHT: 223 LBS | SYSTOLIC BLOOD PRESSURE: 141 MMHG | BODY MASS INDEX: 39.51 KG/M2 | HEIGHT: 63 IN | DIASTOLIC BLOOD PRESSURE: 81 MMHG

## 2025-07-01 DIAGNOSIS — N64.4 BREAST PAIN, RIGHT: ICD-10-CM

## 2025-07-01 DIAGNOSIS — I89.0 LYMPHEDEMA OF BREAST: ICD-10-CM

## 2025-07-01 DIAGNOSIS — C54.1 ENDOMETRIAL CANCER (MULTI): Primary | ICD-10-CM

## 2025-07-01 PROCEDURE — 1036F TOBACCO NON-USER: CPT | Performed by: SURGERY

## 2025-07-01 PROCEDURE — 1159F MED LIST DOCD IN RCRD: CPT | Performed by: SURGERY

## 2025-07-01 PROCEDURE — 99214 OFFICE O/P EST MOD 30 MIN: CPT | Performed by: SURGERY

## 2025-07-01 PROCEDURE — 3077F SYST BP >= 140 MM HG: CPT | Performed by: SURGERY

## 2025-07-01 PROCEDURE — 3044F HG A1C LEVEL LT 7.0%: CPT | Performed by: SURGERY

## 2025-07-01 PROCEDURE — 1160F RVW MEDS BY RX/DR IN RCRD: CPT | Performed by: SURGERY

## 2025-07-01 PROCEDURE — 1125F AMNT PAIN NOTED PAIN PRSNT: CPT | Performed by: SURGERY

## 2025-07-01 PROCEDURE — 3079F DIAST BP 80-89 MM HG: CPT | Performed by: SURGERY

## 2025-07-01 ASSESSMENT — PAIN SCALES - GENERAL: PAINLEVEL_OUTOF10: 6

## 2025-07-02 ENCOUNTER — OFFICE VISIT (OUTPATIENT)
Dept: PRIMARY CARE | Facility: CLINIC | Age: 75
End: 2025-07-02
Payer: MEDICARE

## 2025-07-02 VITALS
HEART RATE: 75 BPM | DIASTOLIC BLOOD PRESSURE: 72 MMHG | RESPIRATION RATE: 17 BRPM | HEIGHT: 63 IN | WEIGHT: 224 LBS | OXYGEN SATURATION: 96 % | SYSTOLIC BLOOD PRESSURE: 134 MMHG | BODY MASS INDEX: 39.69 KG/M2

## 2025-07-02 DIAGNOSIS — E78.00 PURE HYPERCHOLESTEROLEMIA: ICD-10-CM

## 2025-07-02 DIAGNOSIS — N18.32 STAGE 3B CHRONIC KIDNEY DISEASE (MULTI): ICD-10-CM

## 2025-07-02 DIAGNOSIS — C54.1 ENDOMETRIAL CANCER (MULTI): ICD-10-CM

## 2025-07-02 DIAGNOSIS — R51.9 NONINTRACTABLE EPISODIC HEADACHE, UNSPECIFIED HEADACHE TYPE: Primary | ICD-10-CM

## 2025-07-02 DIAGNOSIS — E66.9 OBESITY (BMI 30-39.9): ICD-10-CM

## 2025-07-02 DIAGNOSIS — I48.0 PAROXYSMAL ATRIAL FIBRILLATION (MULTI): ICD-10-CM

## 2025-07-02 DIAGNOSIS — F41.8 MIXED ANXIETY AND DEPRESSIVE DISORDER: ICD-10-CM

## 2025-07-02 DIAGNOSIS — C50.811 MALIGNANT NEOPLASM OF OVERLAPPING SITES OF RIGHT FEMALE BREAST, UNSPECIFIED ESTROGEN RECEPTOR STATUS: ICD-10-CM

## 2025-07-02 DIAGNOSIS — R42 DIZZINESS: ICD-10-CM

## 2025-07-02 DIAGNOSIS — E11.69 TYPE 2 DIABETES MELLITUS WITH OBESITY (MULTI): ICD-10-CM

## 2025-07-02 DIAGNOSIS — E66.9 TYPE 2 DIABETES MELLITUS WITH OBESITY (MULTI): ICD-10-CM

## 2025-07-02 DIAGNOSIS — I10 BENIGN ESSENTIAL HTN: ICD-10-CM

## 2025-07-02 DIAGNOSIS — H81.13 BENIGN PAROXYSMAL POSITIONAL VERTIGO DUE TO BILATERAL VESTIBULAR DISORDER: ICD-10-CM

## 2025-07-02 DIAGNOSIS — G20.A1 PARKINSON'S DISEASE, UNSPECIFIED WHETHER DYSKINESIA PRESENT, UNSPECIFIED WHETHER MANIFESTATIONS FLUCTUATE: ICD-10-CM

## 2025-07-02 PROCEDURE — 99214 OFFICE O/P EST MOD 30 MIN: CPT | Performed by: PHYSICIAN ASSISTANT

## 2025-07-02 PROCEDURE — 1125F AMNT PAIN NOTED PAIN PRSNT: CPT | Performed by: PHYSICIAN ASSISTANT

## 2025-07-02 PROCEDURE — 1159F MED LIST DOCD IN RCRD: CPT | Performed by: PHYSICIAN ASSISTANT

## 2025-07-02 PROCEDURE — G2211 COMPLEX E/M VISIT ADD ON: HCPCS | Performed by: PHYSICIAN ASSISTANT

## 2025-07-02 PROCEDURE — 1160F RVW MEDS BY RX/DR IN RCRD: CPT | Performed by: PHYSICIAN ASSISTANT

## 2025-07-02 PROCEDURE — 3044F HG A1C LEVEL LT 7.0%: CPT | Performed by: PHYSICIAN ASSISTANT

## 2025-07-02 PROCEDURE — 3075F SYST BP GE 130 - 139MM HG: CPT | Performed by: PHYSICIAN ASSISTANT

## 2025-07-02 PROCEDURE — 1036F TOBACCO NON-USER: CPT | Performed by: PHYSICIAN ASSISTANT

## 2025-07-02 PROCEDURE — 3078F DIAST BP <80 MM HG: CPT | Performed by: PHYSICIAN ASSISTANT

## 2025-07-02 RX ORDER — PREDNISONE 20 MG/1
TABLET ORAL
Qty: 11 TABLET | Refills: 0 | Status: SHIPPED | OUTPATIENT
Start: 2025-07-02

## 2025-07-02 RX ORDER — METOPROLOL SUCCINATE 25 MG/1
25 TABLET, EXTENDED RELEASE ORAL DAILY
Qty: 30 TABLET | Refills: 0 | Status: SHIPPED | OUTPATIENT
Start: 2025-07-02 | End: 2025-08-01

## 2025-07-02 ASSESSMENT — PATIENT HEALTH QUESTIONNAIRE - PHQ9
2. FEELING DOWN, DEPRESSED OR HOPELESS: SEVERAL DAYS
SUM OF ALL RESPONSES TO PHQ9 QUESTIONS 1 AND 2: 1
1. LITTLE INTEREST OR PLEASURE IN DOING THINGS: NOT AT ALL

## 2025-07-02 ASSESSMENT — ENCOUNTER SYMPTOMS
LOSS OF SENSATION IN FEET: 0
OCCASIONAL FEELINGS OF UNSTEADINESS: 0
DEPRESSION: 0

## 2025-07-02 ASSESSMENT — PAIN SCALES - GENERAL: PAINLEVEL_OUTOF10: 6

## 2025-07-02 NOTE — PROGRESS NOTES
Subjective   Patient ID:   Dina Mullins is a 74 y.o. female who presents for Hypertension and Headache.  HPI  I referred to PT and vestibular therapy previously.    Headaches:  Symptoms x a few weeks.  Most days.  Wakes up with headache.  Relieved with Tylenol.  Wondering about BP.  Denies vision disturbance, nausea.    Hospital follow up:  Admitted from 1/8-1/11/25.  Discharge note reviewed.  Was admitted for observation after hysterectomy for endometrial adenocarcinoma.  Seeing GYN/onc.    Left arm pain:  Seeing ortho.  I gave Robaxin previously.  Was found to have moderate OA in her arm.  Symptoms x a couple weeks.  No acute injury.  Difficulty with raising her arm.  No elbow/wrist/neck pains.  Mostly in the shoulder area.    Breast cancer/endometrial cancer:  Had hysterectomy in Jan 2025.  Had a lumpectomy in Aug 2024.  Was found to be invasive ductal carcinoma in July 2024.  Following with surgical oncology.  Screening and diagnostic mammograms were abnormal in May 2024.    HTN/A-fib:  Taking Xarelto.  Not on specific BP medications.  Seeing cardiology.  BP today is 134/72.    Diabetes:  Taking Lantus 20 units daily.  Last A1C was 5.8 in May 2025.  She is having lower sugars into the 60s on occasion.    Dizziness/Tinnitus/BPPV:  I referred to ENT and vestibular therapy previously.  She has never seen vestibular therapy.  She had recently been started on Dopamine by neurology when this started.  Taking Meclizine, but this no longer helps.  She did have a normal brain MRI in Feb 2025.  She does see neurology.  Does not follow with ENT - states they were worthless and just told her to hold her head straight for 3 days.  Causing everyday struggles right now.    HLD:  Taking Atorvastatin.  Last checked Sep 2024.    Anxiety/Depression:  Taking Cymbalta.  Denies SI/HI.    Parkinson's:  Taking Dopamine now.  Seeing neurology.    Stage III CKD:  Creatinine is stable in Feb 2025.    Health maintenance:  Smoking:  Never a smoker.  Mammogram (40-75): Abnormal screening/diagnostic mammo in April 2024. Following with surgical oncology.  Labs: Feb 2025.  DEXA (65+, q 2 years): Dec 2021.  Prevnar 13 (65+): 2015  Pneumovax 23 (65+, 1 year after Prev 13): 2020  Influenza:  Zostavax (60+, 1 time, at pharmacy):    Review of Systems  12 point review of systems negative unless stated above in HPI    Vitals:    07/02/25 1303   BP: 134/72   Pulse: 75   Resp: 17   SpO2: 96%     Physical Exam  General: Alert and oriented, well nourished, no acute distress.  Lungs: Clear to auscultation, non-labored respiration.  Heart: Normal rate, regular rhythm, no murmur, gallop or edema.  Neurologic: Awake, alert, and oriented X3, CN II-XII intact.  Psychiatric: Cooperative, appropriate mood and affect.    Assessment/Plan   It was good seeing you!  I have sent in Metoprolol and Prednisone to treat these headaches.  Please also start checking BP at home daily.  Call if getting over 140/90 regularly.  Consider head imaging.  If symptoms persist or worsen despite current plan of care, please contact your healthcare provider for further evaluation.  Patient instructed to contact the office if there are any questions regarding their care or treatment.   [facility] [phone]    Fu as scheduled or sooner  Diagnoses and all orders for this visit:  Nonintractable episodic headache, unspecified headache type  -     predniSONE (Deltasone) 20 mg tablet; TAKE 2 TABLETS BY MOUTH DAYS 1-4. THEN TAKE 1 TABLET BY MOUTH DAYS 5-7.  -     metoprolol succinate XL (Toprol-XL) 25 mg 24 hr tablet; Take 1 tablet (25 mg) by mouth once daily. Do not crush or chew.  Endometrial cancer (Multi)  Type 2 diabetes mellitus with obesity (Multi)  Paroxysmal atrial fibrillation (Multi)  Benign essential HTN  Pure hypercholesterolemia  Parkinson's disease, unspecified whether dyskinesia present, unspecified whether manifestations fluctuate  Mixed anxiety and  depressive disorder  Malignant neoplasm of overlapping sites of right female breast, unspecified estrogen receptor status  Dizziness  Stage 3b chronic kidney disease (Multi)  Benign paroxysmal positional vertigo due to bilateral vestibular disorder  BMI 39.0-39.9,adult  Obesity (BMI 30-39.9)

## 2025-07-03 ENCOUNTER — HOSPITAL ENCOUNTER (OUTPATIENT)
Dept: RADIOLOGY | Facility: HOSPITAL | Age: 75
Discharge: HOME | End: 2025-07-03
Payer: MEDICARE

## 2025-07-03 DIAGNOSIS — N64.4 BREAST PAIN, RIGHT: ICD-10-CM

## 2025-07-03 PROCEDURE — 77061 BREAST TOMOSYNTHESIS UNI: CPT | Mod: RT

## 2025-07-03 PROCEDURE — 76642 ULTRASOUND BREAST LIMITED: CPT | Mod: RT

## 2025-07-09 ENCOUNTER — OFFICE VISIT (OUTPATIENT)
Dept: HEMATOLOGY/ONCOLOGY | Facility: CLINIC | Age: 75
End: 2025-07-09
Payer: MEDICARE

## 2025-07-09 VITALS
OXYGEN SATURATION: 98 % | RESPIRATION RATE: 18 BRPM | WEIGHT: 221.12 LBS | HEART RATE: 59 BPM | TEMPERATURE: 98.1 F | DIASTOLIC BLOOD PRESSURE: 80 MMHG | BODY MASS INDEX: 39.17 KG/M2 | SYSTOLIC BLOOD PRESSURE: 132 MMHG

## 2025-07-09 DIAGNOSIS — Z79.811 AROMATASE INHIBITOR USE: ICD-10-CM

## 2025-07-09 DIAGNOSIS — Z17.0 MALIGNANT NEOPLASM OF LOWER-INNER QUADRANT OF LEFT BREAST IN FEMALE, ESTROGEN RECEPTOR POSITIVE: Primary | ICD-10-CM

## 2025-07-09 DIAGNOSIS — C50.312 MALIGNANT NEOPLASM OF LOWER-INNER QUADRANT OF LEFT BREAST IN FEMALE, ESTROGEN RECEPTOR POSITIVE: Primary | ICD-10-CM

## 2025-07-09 DIAGNOSIS — C50.811 MALIGNANT NEOPLASM OF OVERLAPPING SITES OF RIGHT FEMALE BREAST, UNSPECIFIED ESTROGEN RECEPTOR STATUS: ICD-10-CM

## 2025-07-09 PROCEDURE — 3075F SYST BP GE 130 - 139MM HG: CPT | Performed by: INTERNAL MEDICINE

## 2025-07-09 PROCEDURE — 3079F DIAST BP 80-89 MM HG: CPT | Performed by: INTERNAL MEDICINE

## 2025-07-09 PROCEDURE — 99215 OFFICE O/P EST HI 40 MIN: CPT | Mod: GC | Performed by: INTERNAL MEDICINE

## 2025-07-09 PROCEDURE — 1159F MED LIST DOCD IN RCRD: CPT | Performed by: INTERNAL MEDICINE

## 2025-07-09 PROCEDURE — 1126F AMNT PAIN NOTED NONE PRSNT: CPT | Performed by: INTERNAL MEDICINE

## 2025-07-09 PROCEDURE — 99215 OFFICE O/P EST HI 40 MIN: CPT | Performed by: INTERNAL MEDICINE

## 2025-07-09 PROCEDURE — 3044F HG A1C LEVEL LT 7.0%: CPT | Performed by: INTERNAL MEDICINE

## 2025-07-09 ASSESSMENT — ENCOUNTER SYMPTOMS
DIFFICULTY URINATING: 0
ABDOMINAL PAIN: 0
FATIGUE: 1
APPETITE CHANGE: 1
COUGH: 0
DIARRHEA: 0
FLANK PAIN: 0
NAUSEA: 0
SHORTNESS OF BREATH: 0
BACK PAIN: 0
HEMOPTYSIS: 0
VOMITING: 0
BLOOD IN STOOL: 0
ABDOMINAL DISTENTION: 0
DIZZINESS: 1
UNEXPECTED WEIGHT CHANGE: 0
DIAPHORESIS: 0
WHEEZING: 0
CHILLS: 0
FEVER: 0
LIGHT-HEADEDNESS: 1
HEMATURIA: 0
CONSTIPATION: 0
CHEST TIGHTNESS: 0
WOUND: 0
NECK PAIN: 0
BRUISES/BLEEDS EASILY: 0

## 2025-07-09 ASSESSMENT — PAIN SCALES - GENERAL: PAINLEVEL_OUTOF10: 0-NO PAIN

## 2025-07-09 NOTE — PROGRESS NOTES
Patient here for follow up with Dr. Ospina. Medications and allergies reviewed with patient.     She reports she is doing well overall. She denies pain, nausea, vomiting, diarrhea, constipation, difficulty eating or weight loss. She denies breast changes or abnormalities.     Per orders she is to get DEXA scan and follow up in 3 months. She is to consider taking anastrozole, information printed and reviewed with patient including side effects.   Referral placed for genetic testing.     Patient verbalized understanding using the teach back method, no further questions at this time.

## 2025-07-09 NOTE — PROGRESS NOTES
Patient ID: Dina Mullins is a 74 y.o. female.  Referring Physician: No referring provider defined for this encounter.  Primary Care Provider: Ken Saleh PA-C      Subjective    HPI  Mrs. Mullins is a 74yo F with PMH of HTN, Parkinson's, afib on rivaroxaban, chronic vertigo, T2DM, resected R sided node positive breast cancer, and recently diagnosed endometrial cancer s/p hysterectomy with oophorectomy who presents to re-establish care for her breast cancer.    Pt previously saw Dr. Birch back in 9/2024, but patient does not remember this encounter. After the lumpectomy, pt was shortly after diagnosed with endometrial cancer in the setting of postmenopausal bleeding. Pt had a hysterectomy done in January 2025.    Pt reports stable fatigue at home. Continues to endorse vertigo symptoms. Occasionally uses a wheelchair. She has noted that her right breast has been getting larger than her left breast. Pt had ultrasound and evaluated by surgical onc and found to have lymphedema as the cause. Recent mammogram and breast ultrasound showed no evidence of malignancy.    Breast Cancer History  4/17/24: screening mammogram showing indeterminate right breast mass with prominent R axillary LN, BIRADS 0    5/9/24: diagnostic mammogram showing spiculated 12 o'clock mass suspicious for malignancy and a suspicious level 1 R axillary LN, BIRADS 4    7/1/24: ultrasound guided biopsy of 12 o'clock mass showed IDC G1, ER+, DE+, HER2-, negative R axillary LN. Ultrasound of additional 11 o'clock mass showed IDC G3 TNBC.     8/14/24: R breast lumpectomy and SLNBx     11/22-12/26/24: completed 4256 cGy in 16 fractions to the right breast, low axilla and 1000 cGy in 4 fraction to the tumor beds a total dose of 5256 cGy in 20 fraction     Pathology shows two separate breast cancers:  1) pT2 pN0 R poorly differentiated carcinoma with squamous differentiation (metaplastic), G3, ER negative, DE negative, HER2 negative (1+),  24mm  2) pT1c pN1mi (sn) R IDC, G1, ER+, TX+, HER2 negative (2+ with negative FISH), 14mm     Endometrial Cancer History  24 TVUS: Abnormal endometrial thickness of 1.1 cm with a 1.0 x 1.2 x 1.3 cm solid and slightly hyperechoic submucosal mass demonstrated.     2024 Endometrial biopsy: FIGO grade 2 endometrioid adenocarcinoma of the uterus     2025: hysterectomy with salpino-oophrectomy    Medical History  T2DM  Obesity  Parkinson's  CKD stage 3  HTN  Atrial fibrillation    Reproductive History    Onset of menses - 11  Onset of menopause - 64  Post-menopausal bleeding - Intermittent bleeding status post hysterectomy for endometrial carcinoma  Estrogen replacement: Denies     Surgical History  Breast lumpectomy    Social History  Denies history of smoking, alcohol, or other illicit drug use  Lives at home with     Family History  Mother, aunt, and sister diagnosed with breast cancer    Review of Systems   Constitutional:  Positive for appetite change and fatigue. Negative for chills, diaphoresis, fever and unexpected weight change.   HENT:   Negative for hearing loss, lump/mass, mouth sores and nosebleeds.    Respiratory:  Negative for chest tightness, cough, hemoptysis, shortness of breath and wheezing.    Cardiovascular:  Negative for chest pain.   Gastrointestinal:  Negative for abdominal distention, abdominal pain, blood in stool, constipation, diarrhea, nausea and vomiting.   Genitourinary:  Negative for difficulty urinating and hematuria.    Musculoskeletal:  Negative for back pain, flank pain and neck pain.   Skin:  Negative for itching, rash and wound.   Neurological:  Positive for dizziness and light-headedness.   Hematological:  Does not bruise/bleed easily.        Objective   BSA: 2.11 meters squared  /80 (BP Location: Left arm, Patient Position: Sitting, BP Cuff Size: Adult long)   Pulse 59   Temp 36.7 °C (98.1 °F) (Temporal)   Resp 18   Wt 100 kg (221 lb 1.9 oz)    SpO2 98%   BMI 39.17 kg/m²     Family History[1]  Oncology History Overview Note   Stage IA2 endometrioid adenocarcinoma of the uterus, pMMR      Malignant neoplasm of overlapping sites of right female breast   7/1/2024 Cancer Staged    Staging form: Breast, AJCC 8th Edition, Clinical stage from 7/1/2024: Stage IB (cT1, cN0(f), cM0, G3, ER-, NC-, HER2-) - Signed by Mabel Crenshaw MD on 9/3/2024     7/1/2024 Cancer Staged    Staging form: Breast, AJCC 8th Edition, Clinical stage from 7/1/2024: Stage IA (cT1, cN0(f), cM0, G1, ER+, NC+, HER2-) - Signed by Mabel Crenshaw MD on 9/3/2024     7/16/2024 Initial Diagnosis    Malignant neoplasm of overlapping sites of right female breast (Multi)     8/14/2024 Cancer Staged    Staging form: Breast, AJCC 8th Edition, Pathologic stage from 8/14/2024: Stage IIIA (pT2(2), pN1mi(sn), cM0, G3, ER-, NC-, HER2-) - Signed by Kacie Simms MD on 8/29/2024     Endometrial cancer (Multi)   9/26/2024 Initial Diagnosis    Endometrial cancer (Multi)     1/8/2025 Cancer Staged    Staging form: Corpus Uteri - Carcinoma and Carcinosarcoma, AJCC 8th Edition and FIGO 2023, Pathologic stage from 1/8/2025: FIGO Stage IA2, calculated as Stage IA (pT1a, pN0(sn), cM0, POLE: Not Assessed, MMRd-, p53: Not Assessed) - Signed by Bib Buitrago MD on 2/3/2025         Dina Mullins  reports that she has never smoked. She has never been exposed to tobacco smoke. She has never used smokeless tobacco.  She  reports no history of alcohol use.  She  reports no history of drug use.    Physical Exam  Constitutional:       General: She is not in acute distress.     Appearance: She is obese. She is not ill-appearing or toxic-appearing.   HENT:      Head: Normocephalic and atraumatic.      Mouth/Throat:      Mouth: Mucous membranes are moist.      Pharynx: No oropharyngeal exudate or posterior oropharyngeal erythema.   Eyes:      Pupils: Pupils are equal, round, and reactive to light.   Cardiovascular:       Rate and Rhythm: Normal rate and regular rhythm.      Heart sounds: No murmur heard.     No friction rub. No gallop.   Pulmonary:      Effort: Pulmonary effort is normal. No respiratory distress.      Breath sounds: Normal breath sounds. No stridor. No wheezing, rhonchi or rales.   Chest:   Breasts:     Breasts are asymmetrical.      Right: Swelling present.   Abdominal:      General: Bowel sounds are normal. There is no distension.      Palpations: Abdomen is soft. There is no mass.      Tenderness: There is no abdominal tenderness.   Musculoskeletal:         General: Normal range of motion.      Cervical back: Neck supple.   Skin:     General: Skin is warm and dry.         Performance Status:  Symptomatic; in bed <50% of the day      Imaging  BI MAMMO RIGHT DIAGNOSTIC TOMOSYNTHESIS; BI US BREAST LIMITED RIGHT;  7/3/2025 10:32 am; 7/3/2025 10:51 am      ACCESSION NUMBER(S):  [id number]; [id number]      ORDERING CLINICIAN:  MARIN TOLLIVER      INDICATION:  Patient with a recent partial mastectomy and radiation reports  diffuse right breast pain and swelling and skin thickening. The  patient also reports axillary pain.      ,N64.4 Mastodynia      COMPARISON:  04/15/2025, 04/17/2024      FINDINGS:  MAMMOGRAPHY: 2D and tomosynthesis images were reviewed at 1 mm slice  thickness.      Density:  There are scattered areas of fibroglandular density.      Per the medical record, the patient has been evaluated clinically by  Dr. Tolliver. Clinical findings suggest radiation changes and possible  lymphedema.      Mammographically, there are partial mastectomy changes. There is  diffuse trabecular thickening and skin thickening without evidence of  a mass, pathologic distortion, or calcifications. No adenopathy is  seen.      ULTRASOUND: Targeted ultrasound was performed of the right axilla by  a registered sonographer with elastography.      Sonographic images were obtained of the right axilla. No adenopathy  is seen. No  suspicious masses. A clip is seen in a previously benign  lymph node.      IMPRESSION:  No suspicious findings seen. The constellation of findings is  suggestive of radiation therapy changes and lymphedema.      Clinical follow-up is recommended. The patient will be due for a  bilateral screening mammogram in April 2026      BI-RADS CATEGORY:  BI-RADS Category:  2 Benign.  Recommendation:  Clinical Follow-up and Continued Annual Screening.  Recommended Date:  10 Months.  Laterality:  Bilateral.    Path:  7/1/24:  FINAL DIAGNOSIS   A. Right breast mass, ultrasound-guided core biopsy at 11 o'clock, 8 cm from nipple:  --  Invasive ductal carcinoma, grade 3 with necrosis, see note.      Note:  In this limited sample, the invasive carcinoma measures up to 0.8 cm in greatest dimension.  ER, NH and HER2 will be reported in an addendum.      B. Right breast mass, ultrasound-guided core biopsy at 12 o'clock, 9 cm from nipple:   -- Invasive ductal carcinoma, grade 1 with associated microcalcifications, see note.     Note:  In this limited sample, the invasive carcinoma measures up to 9 mm in greatest dimension.  ER, NH and HER2 will be reported in an addendum.      C. Right axillary lymph node, ultrasound-guided biopsy:    -- Portion of lymph node with reactive changes; negative for carcinoma, see note.     Note: Immunohistochemical stain for cytokeratin AE1/3 is negative.       8/1/24:  FINAL DIAGNOSIS   A. 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   -- Changes consistent with site of previous biopsy.       B. Right breast lumpectomy at 12 o'clock:   -- Invasive ductal carcinoma, see synoptic report.  -- Changes consistent with site of previous biopsy.      C. Right breast sentinel lymph node with magseed, excision:  -- One of two lymph nodes involved by micrometastasis, see note.    -- Changes consistent with site of previous biopsy.      Note: The largest contiguous focus of tumor involving the lymph node measures 1.6 mm consistent with micrometastasis. Immunohistochemical stains for cytokeratin AE1/3 confirm the presence of tumor in the lymph node. The tumor morphologically appears lower grade and is estrogen and progesterone receptor strongly and diffusely positive consistent with a metastasis of the lower grade tumor at 12 o'clock. No extranodal extension is identified. Multiple deeper levels were reviewed.        Assessment/Plan    Mrs. Mullins is a 72yo F with PMH of HTN, Parkinson's, afib on rivaroxaban, chronic vertigo, T2DM, history of FIGO stage IA2 endometrioid type adenocarcinoma status post total abdominal hysterectomy/bilateral salpingo-oophorectomy, and history of R sided node positive breast cancer s/p partial mastectomy & radiation therapy with pathology showing two different breast cancers:    1) pT2 pN0 R poorly differentiated carcinoma with squamous differentiation (metaplastic), G3, ER negative, MS negative, HER2 negative (1+), 24mm  2) pT1c pN1mi (sn) R IDC, G1, ER+, MS+, HER2 negative (2+ with negative FISH), 14mm     - Pt educated on starting anastrozole, pt decided at next follow-up on whether or not to start  - DEXA scan  - Lymphedema therapy referral  - Genetics referral given personal history of both breast & endometrial cancer and strong family history of breast cancer  - Pt will follow-up with Janeth Og in about 3 months    Patient seen and discussed with attending Dr. Bhavesh Enrique MD  Hematology/Oncology Fellow                              [1]   Family History  Problem Relation Name Age of Onset    Cancer Mother      Breast cancer Mother  69    Alzheimer's disease Father

## 2025-07-09 NOTE — PATIENT INSTRUCTIONS
Recommend genetic testing  Recommend bone density test   Recommend anastrozole. Consider taking it and let us know if you would like us to prescribe  Follow-up with Janeth Og in about 3 months

## 2025-07-16 ENCOUNTER — HOSPITAL ENCOUNTER (OUTPATIENT)
Dept: RADIOLOGY | Facility: CLINIC | Age: 75
Discharge: HOME | End: 2025-07-16
Payer: MEDICARE

## 2025-07-16 DIAGNOSIS — C50.312 MALIGNANT NEOPLASM OF LOWER-INNER QUADRANT OF LEFT BREAST IN FEMALE, ESTROGEN RECEPTOR POSITIVE: ICD-10-CM

## 2025-07-16 DIAGNOSIS — Z79.811 AROMATASE INHIBITOR USE: ICD-10-CM

## 2025-07-16 DIAGNOSIS — Z17.0 MALIGNANT NEOPLASM OF LOWER-INNER QUADRANT OF LEFT BREAST IN FEMALE, ESTROGEN RECEPTOR POSITIVE: ICD-10-CM

## 2025-07-16 DIAGNOSIS — C50.811 MALIGNANT NEOPLASM OF OVERLAPPING SITES OF RIGHT FEMALE BREAST, UNSPECIFIED ESTROGEN RECEPTOR STATUS: ICD-10-CM

## 2025-07-16 PROCEDURE — 77080 DXA BONE DENSITY AXIAL: CPT | Performed by: RADIOLOGY

## 2025-07-16 PROCEDURE — 77080 DXA BONE DENSITY AXIAL: CPT

## 2025-07-25 ENCOUNTER — APPOINTMENT (OUTPATIENT)
Dept: PHYSICAL THERAPY | Facility: CLINIC | Age: 75
End: 2025-07-25
Payer: MEDICARE

## 2025-08-05 DIAGNOSIS — R51.9 NONINTRACTABLE EPISODIC HEADACHE, UNSPECIFIED HEADACHE TYPE: ICD-10-CM

## 2025-08-05 RX ORDER — METOPROLOL SUCCINATE 25 MG/1
25 TABLET, EXTENDED RELEASE ORAL DAILY
Qty: 90 TABLET | Refills: 1 | Status: SHIPPED | OUTPATIENT
Start: 2025-08-05

## 2025-08-12 ENCOUNTER — TREATMENT (OUTPATIENT)
Dept: PHYSICAL THERAPY | Facility: CLINIC | Age: 75
End: 2025-08-12
Payer: MEDICARE

## 2025-08-12 DIAGNOSIS — R42 DIZZINESS: ICD-10-CM

## 2025-08-12 DIAGNOSIS — H81.13 BENIGN PAROXYSMAL POSITIONAL VERTIGO DUE TO BILATERAL VESTIBULAR DISORDER: ICD-10-CM

## 2025-08-12 PROCEDURE — 97530 THERAPEUTIC ACTIVITIES: CPT | Mod: GP

## 2025-08-12 PROCEDURE — 97110 THERAPEUTIC EXERCISES: CPT | Mod: GP

## 2025-08-26 ENCOUNTER — OFFICE VISIT (OUTPATIENT)
Dept: PRIMARY CARE | Facility: CLINIC | Age: 75
End: 2025-08-26
Payer: MEDICARE

## 2025-08-26 VITALS
HEIGHT: 62 IN | HEART RATE: 65 BPM | RESPIRATION RATE: 20 BRPM | WEIGHT: 214 LBS | DIASTOLIC BLOOD PRESSURE: 82 MMHG | BODY MASS INDEX: 39.38 KG/M2 | OXYGEN SATURATION: 97 % | SYSTOLIC BLOOD PRESSURE: 132 MMHG

## 2025-08-26 DIAGNOSIS — E66.9 TYPE 2 DIABETES MELLITUS WITH OBESITY (MULTI): ICD-10-CM

## 2025-08-26 DIAGNOSIS — H81.13 BENIGN PAROXYSMAL POSITIONAL VERTIGO DUE TO BILATERAL VESTIBULAR DISORDER: ICD-10-CM

## 2025-08-26 DIAGNOSIS — I10 BENIGN ESSENTIAL HTN: ICD-10-CM

## 2025-08-26 DIAGNOSIS — I48.0 PAROXYSMAL ATRIAL FIBRILLATION (MULTI): ICD-10-CM

## 2025-08-26 DIAGNOSIS — E78.00 PURE HYPERCHOLESTEROLEMIA: ICD-10-CM

## 2025-08-26 DIAGNOSIS — R42 DIZZINESS: ICD-10-CM

## 2025-08-26 DIAGNOSIS — C54.1 ENDOMETRIAL CANCER (MULTI): ICD-10-CM

## 2025-08-26 DIAGNOSIS — G20.A1 PARKINSON'S DISEASE, UNSPECIFIED WHETHER DYSKINESIA PRESENT, UNSPECIFIED WHETHER MANIFESTATIONS FLUCTUATE: ICD-10-CM

## 2025-08-26 DIAGNOSIS — F41.8 MIXED ANXIETY AND DEPRESSIVE DISORDER: ICD-10-CM

## 2025-08-26 DIAGNOSIS — E66.9 OBESITY (BMI 30-39.9): ICD-10-CM

## 2025-08-26 DIAGNOSIS — C50.811 MALIGNANT NEOPLASM OF OVERLAPPING SITES OF RIGHT FEMALE BREAST, UNSPECIFIED ESTROGEN RECEPTOR STATUS: ICD-10-CM

## 2025-08-26 DIAGNOSIS — N18.32 STAGE 3B CHRONIC KIDNEY DISEASE (MULTI): ICD-10-CM

## 2025-08-26 DIAGNOSIS — E11.69 TYPE 2 DIABETES MELLITUS WITH OBESITY (MULTI): ICD-10-CM

## 2025-08-26 DIAGNOSIS — R51.9 NONINTRACTABLE EPISODIC HEADACHE, UNSPECIFIED HEADACHE TYPE: Primary | ICD-10-CM

## 2025-08-26 LAB — POC HEMOGLOBIN A1C: 6.6 % (ref 4.2–6.5)

## 2025-08-26 PROCEDURE — 99214 OFFICE O/P EST MOD 30 MIN: CPT | Performed by: PHYSICIAN ASSISTANT

## 2025-08-26 PROCEDURE — 1126F AMNT PAIN NOTED NONE PRSNT: CPT | Performed by: PHYSICIAN ASSISTANT

## 2025-08-26 PROCEDURE — 1159F MED LIST DOCD IN RCRD: CPT | Performed by: PHYSICIAN ASSISTANT

## 2025-08-26 PROCEDURE — 3044F HG A1C LEVEL LT 7.0%: CPT | Performed by: PHYSICIAN ASSISTANT

## 2025-08-26 PROCEDURE — G2211 COMPLEX E/M VISIT ADD ON: HCPCS | Performed by: PHYSICIAN ASSISTANT

## 2025-08-26 PROCEDURE — 3079F DIAST BP 80-89 MM HG: CPT | Performed by: PHYSICIAN ASSISTANT

## 2025-08-26 PROCEDURE — 3075F SYST BP GE 130 - 139MM HG: CPT | Performed by: PHYSICIAN ASSISTANT

## 2025-08-26 PROCEDURE — 83036 HEMOGLOBIN GLYCOSYLATED A1C: CPT | Performed by: PHYSICIAN ASSISTANT

## 2025-08-26 PROCEDURE — 1160F RVW MEDS BY RX/DR IN RCRD: CPT | Performed by: PHYSICIAN ASSISTANT

## 2025-08-26 PROCEDURE — 1036F TOBACCO NON-USER: CPT | Performed by: PHYSICIAN ASSISTANT

## 2025-08-26 RX ORDER — INSULIN GLARGINE 100 [IU]/ML
13 INJECTION, SOLUTION SUBCUTANEOUS 2 TIMES DAILY
Qty: 9 ML | Refills: 3 | Status: SHIPPED | OUTPATIENT
Start: 2025-08-26 | End: 2025-10-25

## 2025-08-26 ASSESSMENT — PATIENT HEALTH QUESTIONNAIRE - PHQ9
SUM OF ALL RESPONSES TO PHQ9 QUESTIONS 1 AND 2: 1
1. LITTLE INTEREST OR PLEASURE IN DOING THINGS: NOT AT ALL
2. FEELING DOWN, DEPRESSED OR HOPELESS: SEVERAL DAYS

## 2025-08-26 ASSESSMENT — ENCOUNTER SYMPTOMS
LOSS OF SENSATION IN FEET: 0
OCCASIONAL FEELINGS OF UNSTEADINESS: 0
DEPRESSION: 0

## 2025-08-26 ASSESSMENT — PAIN SCALES - GENERAL: PAINLEVEL_OUTOF10: 0-NO PAIN

## 2025-08-27 ENCOUNTER — APPOINTMENT (OUTPATIENT)
Dept: PHYSICAL THERAPY | Facility: CLINIC | Age: 75
End: 2025-08-27
Payer: MEDICARE

## 2025-08-27 DIAGNOSIS — H81.13 BENIGN PAROXYSMAL POSITIONAL VERTIGO DUE TO BILATERAL VESTIBULAR DISORDER: ICD-10-CM

## 2025-08-27 DIAGNOSIS — R42 DIZZINESS: ICD-10-CM

## 2025-08-27 PROCEDURE — 97110 THERAPEUTIC EXERCISES: CPT | Mod: GP

## 2025-08-27 PROCEDURE — 97530 THERAPEUTIC ACTIVITIES: CPT | Mod: GP

## 2025-10-02 ENCOUNTER — APPOINTMENT (OUTPATIENT)
Dept: RADIOLOGY | Facility: HOSPITAL | Age: 75
End: 2025-10-02
Payer: MEDICARE

## 2025-10-21 ENCOUNTER — APPOINTMENT (OUTPATIENT)
Dept: RADIOLOGY | Facility: HOSPITAL | Age: 75
End: 2025-10-21
Payer: MEDICARE

## 2025-11-18 ENCOUNTER — APPOINTMENT (OUTPATIENT)
Dept: PRIMARY CARE | Facility: CLINIC | Age: 75
End: 2025-11-18
Payer: MEDICARE

## 2025-12-01 ENCOUNTER — APPOINTMENT (OUTPATIENT)
Dept: NEUROLOGY | Facility: CLINIC | Age: 75
End: 2025-12-01
Payer: MEDICARE

## 2026-04-23 ENCOUNTER — APPOINTMENT (OUTPATIENT)
Dept: RADIOLOGY | Facility: HOSPITAL | Age: 76
End: 2026-04-23
Payer: MEDICARE

## (undated) DEVICE — PROTECTOR, NERVE, ULNAR, PINK

## (undated) DEVICE — BAG, TISSUE RETRIEVAL, 10MM, ANCHOR

## (undated) DEVICE — TIP, SUCTION, YANKAUER, BULB, ADULT

## (undated) DEVICE — ACCESS PORT, 12MM, 100MM LENGTH, LOW PROFILE W/BLADELESS OPTICAL TIP

## (undated) DEVICE — DRAPE, COLUMN, DAVINCI XI

## (undated) DEVICE — APPLICATOR, CHLORAPREP, W/ORANGE TINT, 26ML

## (undated) DEVICE — NEEDLE DRIVER, MEGA SUTURECUT, FORCE FEEDBACK

## (undated) DEVICE — Device

## (undated) DEVICE — TOWELS 4-PK

## (undated) DEVICE — SUTURE, MONOCRYL, 4-0, 27 IN, PS-2, UNDYED

## (undated) DEVICE — DRAPE, LEGGINGS, 48 X 31 IN, STERILE, LF

## (undated) DEVICE — RETRACTOR, CERVICAL CUP, VCARE, STANDARD

## (undated) DEVICE — GOWN, SURGICAL, SMARTGOWN, XLARGE, STERILE

## (undated) DEVICE — DRAPE, ARM XI

## (undated) DEVICE — KIT, ROBOTIC, CUSTOM UHC

## (undated) DEVICE — POSITIONING, THE PINK PAD, PIGAZZI SYSTEM

## (undated) DEVICE — DRAPE PACK, UNIVERSAL II

## (undated) DEVICE — FORCEPS, BIPOLAR FENESTRATED XI

## (undated) DEVICE — DRAPE, UNDERBUTTOCKS

## (undated) DEVICE — ADHESIVE, SKIN, MASTISOL, 2/3 CC VIAL

## (undated) DEVICE — NEEDLE, STIMUPLEX, INSULATED, 21GA X 4IN.

## (undated) DEVICE — DRESSING, GAUZE, DRAIN SPONGE, 6 PLY, EXCILON, 4 X 4 IN, STERILE

## (undated) DEVICE — KIT, MARGINMARKER, 6 INK COLORS, STANDARD

## (undated) DEVICE — FORCEPS, CADIERE, DAVINCI XI

## (undated) DEVICE — OBTURATOR, BLADELESS , SU

## (undated) DEVICE — OCCLUDER, COLPO-PNEUMO

## (undated) DEVICE — GLOVE, SURGICAL, PROTEXIS PI BLUE W/NEUTHERA, 6.5, PF, LF

## (undated) DEVICE — GLOVE, SURGICAL, PROTEXIS PI , 6.5, PF, LF

## (undated) DEVICE — CLIP, LIGATING, LIGACLIP EXTRA, SMALL, 26 MM, TITANIUM

## (undated) DEVICE — DRAPE, PAD, PREP, W/ 9 IN CUFF, 24 X 41, LF, NS

## (undated) DEVICE — HEMOSTAT, ARISTA, ABSORBABLE, 3 GRAMS

## (undated) DEVICE — SYRINGE, 60ML, FASTURN W/QUICK FILL TUBE

## (undated) DEVICE — SLEEVE, VASO PRESS, CALF GARMENT, MEDIUM, GREEN

## (undated) DEVICE — SCISSORS, MONOPOLAR, CURVED, 8MM

## (undated) DEVICE — BAG, DRAIN METER, URINE, 350CC, LF

## (undated) DEVICE — SYRINGE, HYPODERMIC, CONTROL, LUER LOCK, 10 CC, PLASTIC, STERILE

## (undated) DEVICE — DISSECTOR, EXACT, LIGASURE

## (undated) DEVICE — DRAPE, SHEET, LAPAROTOMY, W/ISO-BAC, W/ARMBOARD COVERS, 98 X 122 IN, DISPOSABLE, LF, STERILE

## (undated) DEVICE — CATHETER, URETHRAL, FOLEY, 2 WAY, 16 FR, 5 CC, SILICONE

## (undated) DEVICE — PREP TRAY, SKIN, DRY, W/GLOVES

## (undated) DEVICE — ELECTRODE, ELECTROSURGICAL, BLADE, INSULATED, ENT/IMA, STERILE

## (undated) DEVICE — SUTURE, VICRYL, 3-0, 27 IN, SH

## (undated) DEVICE — SUTURE, VICRYL, 0, 27 IN, UR-6, VIOLET

## (undated) DEVICE — DRESSING, ADHESIVE, ISLAND, TELFA, 2 X 3.75 IN, LF

## (undated) DEVICE — BLADE, SURGICAL, POLYMER COATED P15, STERILE, DISP

## (undated) DEVICE — SEAL, UNIVERSAL, 5-12MM

## (undated) DEVICE — STAPLER, SKIN, DISPOSABLE, 35 COUNT, WIDE, STERILE

## (undated) DEVICE — STRIP, SKIN CLOSURE, STERI STRIP, REINFORCED, 0.5 X 4 IN

## (undated) DEVICE — SOLUTION, IRRIGATION, X RX SODIUM CHL 0.9%, 1000ML BTL

## (undated) DEVICE — MANIFOLD, 4 PORT NEPTUNE STANDARD

## (undated) DEVICE — DRAPE, TIBURON W/ADHESIVE, 19 X 30

## (undated) DEVICE — TUBING SET, TRI-LUMEN, FILTERED, F/AIRSEAL

## (undated) DEVICE — SUTURE, SILK, 3-0, 30 IN, BR SH, BLACK

## (undated) DEVICE — BLADE, SURGICAL, POLYMER COATED P10, STERILE, DISP

## (undated) DEVICE — DRESSING, GAUZE, SPONGE, KERLIX, SUPER, 6 X 6.75 IN, STERILE 10PK

## (undated) DEVICE — SPONGE, LAPAROTOMY, 4 X 18 IN

## (undated) DEVICE — COVER, TIP HOT SHEARS ENDOWRIST